# Patient Record
Sex: FEMALE | Race: WHITE | HISPANIC OR LATINO | Employment: FULL TIME | URBAN - METROPOLITAN AREA
[De-identification: names, ages, dates, MRNs, and addresses within clinical notes are randomized per-mention and may not be internally consistent; named-entity substitution may affect disease eponyms.]

---

## 2021-10-06 ENCOUNTER — OFFICE VISIT (OUTPATIENT)
Dept: OBGYN CLINIC | Age: 26
End: 2021-10-06
Payer: COMMERCIAL

## 2021-10-06 VITALS
DIASTOLIC BLOOD PRESSURE: 70 MMHG | SYSTOLIC BLOOD PRESSURE: 124 MMHG | BODY MASS INDEX: 22.89 KG/M2 | WEIGHT: 137.4 LBS | HEIGHT: 65 IN

## 2021-10-06 DIAGNOSIS — Z30.41 ENCOUNTER FOR SURVEILLANCE OF CONTRACEPTIVE PILLS: ICD-10-CM

## 2021-10-06 DIAGNOSIS — Z01.419 ENCOUNTER FOR ANNUAL ROUTINE GYNECOLOGICAL EXAMINATION: Primary | ICD-10-CM

## 2021-10-06 PROCEDURE — G0145 SCR C/V CYTO,THINLAYER,RESCR: HCPCS | Performed by: NURSE PRACTITIONER

## 2021-10-06 PROCEDURE — S0610 ANNUAL GYNECOLOGICAL EXAMINA: HCPCS | Performed by: NURSE PRACTITIONER

## 2021-10-06 RX ORDER — NORETHINDRONE ACETATE AND ETHINYL ESTRADIOL 1MG-20(21)
1 KIT ORAL DAILY
COMMUNITY
End: 2021-12-20 | Stop reason: SDUPTHER

## 2021-10-12 LAB
LAB AP GYN PRIMARY INTERPRETATION: NORMAL
Lab: NORMAL

## 2021-12-14 ENCOUNTER — TELEPHONE (OUTPATIENT)
Dept: OBGYN CLINIC | Facility: CLINIC | Age: 26
End: 2021-12-14

## 2021-12-14 DIAGNOSIS — Z30.41 ENCOUNTER FOR SURVEILLANCE OF CONTRACEPTIVE PILLS: Primary | ICD-10-CM

## 2021-12-14 RX ORDER — NORETHINDRONE ACETATE AND ETHINYL ESTRADIOL 1MG-20(21)
1 KIT ORAL DAILY
Qty: 90 TABLET | Refills: 1 | Status: CANCELLED | OUTPATIENT
Start: 2021-12-14

## 2021-12-20 DIAGNOSIS — Z30.41 ENCOUNTER FOR SURVEILLANCE OF CONTRACEPTIVE PILLS: Primary | ICD-10-CM

## 2021-12-20 RX ORDER — NORETHINDRONE ACETATE AND ETHINYL ESTRADIOL 1MG-20(21)
1 KIT ORAL DAILY
Qty: 90 TABLET | Refills: 3 | Status: SHIPPED | OUTPATIENT
Start: 2021-12-20

## 2022-11-21 ENCOUNTER — OFFICE VISIT (OUTPATIENT)
Dept: FAMILY MEDICINE CLINIC | Facility: CLINIC | Age: 27
End: 2022-11-21

## 2022-11-21 VITALS
WEIGHT: 141 LBS | TEMPERATURE: 98.8 F | SYSTOLIC BLOOD PRESSURE: 130 MMHG | OXYGEN SATURATION: 98 % | HEIGHT: 64 IN | DIASTOLIC BLOOD PRESSURE: 70 MMHG | BODY MASS INDEX: 24.07 KG/M2 | HEART RATE: 102 BPM

## 2022-11-21 DIAGNOSIS — Z76.89 ENCOUNTER TO ESTABLISH CARE: ICD-10-CM

## 2022-11-21 DIAGNOSIS — Z11.1 ENCOUNTER FOR PPD TEST: ICD-10-CM

## 2022-11-21 DIAGNOSIS — Z00.00 ANNUAL PHYSICAL EXAM: Primary | ICD-10-CM

## 2022-11-21 NOTE — PROGRESS NOTES
[address]    NAME: Eddie Alexander  AGE: 28 y o  SEX: female  : 1995     DATE: 2022     Assessment and Plan:     Problem List Items Addressed This Visit    None  Visit Diagnoses     Annual physical exam    -  Primary    Positive depression screen addressed  Patient has good support system, starting new position at local school  Encounter to establish care        Encounter for PPD test        Relevant Orders    TB Skin Test (Completed)          Immunizations and preventive care screenings were discussed with patient today  Appropriate education was printed on patient's after visit summary  Counseling:  Alcohol/drug use: discussed moderation in alcohol intake, the recommendations for healthy alcohol use, and avoidance of illicit drug use  Dental Health: discussed importance of regular tooth brushing, flossing, and dental visits  Injury prevention: discussed safety/seat belts, safety helmets, smoke detectors, carbon dioxide detectors, and smoking near bedding or upholstery  Sexual health: discussed sexually transmitted diseases, partner selection, use of condoms, avoidance of unintended pregnancy, and contraceptive alternatives  Exercise: the importance of regular exercise/physical activity was discussed  Recommend exercise 3-5 times per week for at least 30 minutes  Depression Screening and Follow-up Plan: Patient's depression screening was positive with a PHQ-2 score of 3  Their PHQ-9 score was 3  Patient assessed for underlying major depression  Brief counseling provided and recommend additional follow-up/re-evaluation next office visit           Return in 1 year (on 2023) for Annual physical      Chief Complaint:     Chief Complaint   Patient presents with   • Establish Care   • Annual Exam      History of Present Illness:     Adult Annual Physical   Patient here for a comprehensive physical exam  The patient reports no problems  Diet and Physical Activity  Diet/Nutrition: well balanced diet  Exercise: vigorous cardiovascular exercise, 5-7 times a week on average and 1-2 hours on average  Depression Screening  PHQ-2/9 Depression Screening    Little interest or pleasure in doing things: 3 - nearly every day  Feeling down, depressed, or hopeless: 0 - not at all  Trouble falling or staying asleep, or sleeping too much: 0 - not at all  Feeling tired or having little energy: 0 - not at all  Poor appetite or overeatin - not at all  Feeling bad about yourself - or that you are a failure or have let yourself or your family down: 0 - not at all  Trouble concentrating on things, such as reading the newspaper or watching television: 0 - not at all  Moving or speaking so slowly that other people could have noticed  Or the opposite - being so fidgety or restless that you have been moving around a lot more than usual: 0 - not at all  Thoughts that you would be better off dead, or of hurting yourself in some way: 0 - not at all  PHQ-2 Score: 3  PHQ-2 Interpretation: POSITIVE depression screen  PHQ-9 Score: 3   PHQ-9 Interpretation: No or Minimal depression        General Health  Sleep: sleeps well and gets 7-8 hours of sleep on average  Hearing: normal - bilateral   Vision: no vision problems, most recent eye exam >1 year ago and wears glasses and contacts  Dental: regular dental visits and brushes teeth twice daily  /GYN Health  Last menstrual period: 22  Contraceptive method: oral contraceptives  History of STDs?: no      Review of Systems:     Review of Systems   Constitutional: Negative for chills and fever  HENT: Negative for ear pain and sore throat  Eyes: Negative for pain and visual disturbance  Respiratory: Negative for cough and shortness of breath  Cardiovascular: Negative for chest pain and palpitations     Gastrointestinal: Negative for abdominal pain and vomiting  Genitourinary: Negative for dysuria and hematuria  Musculoskeletal: Negative for arthralgias and back pain  Skin: Negative for color change and rash  Neurological: Negative for seizures and syncope  Psychiatric/Behavioral: Negative for decreased concentration and sleep disturbance  The patient is not nervous/anxious  All other systems reviewed and are negative  Past Medical History:     Past Medical History:   Diagnosis Date   • Varicella       Past Surgical History:     Past Surgical History:   Procedure Laterality Date   • APPENDECTOMY  2012      Social History:     Social History     Socioeconomic History   • Marital status: /Civil Union     Spouse name: None   • Number of children: None   • Years of education: None   • Highest education level: None   Occupational History   • None   Tobacco Use   • Smoking status: Never   • Smokeless tobacco: Never   Vaping Use   • Vaping Use: Never used   Substance and Sexual Activity   • Alcohol use: Yes   • Drug use: Never   • Sexual activity: Yes     Partners: Male     Birth control/protection: OCP   Other Topics Concern   • None   Social History Narrative   • None     Social Determinants of Health     Financial Resource Strain: Not on file   Food Insecurity: Not on file   Transportation Needs: Not on file   Physical Activity: Not on file   Stress: Not on file   Social Connections: Not on file   Intimate Partner Violence: Not on file   Housing Stability: Not on file      Family History:     Family History   Problem Relation Age of Onset   • Breast cancer Neg Hx    • Colon cancer Neg Hx    • Cancer Neg Hx    • Ovarian cancer Neg Hx       Current Medications:     Current Outpatient Medications   Medication Sig Dispense Refill   • norethindrone-ethinyl estradiol (JUNEL FE 1/20) 1-20 MG-MCG per tablet Take 1 tablet by mouth daily 90 tablet 3     No current facility-administered medications for this visit        Allergies:     No Known Allergies   Physical Exam:     /70 (BP Location: Right arm, Patient Position: Sitting)   Pulse 102   Temp 98 8 °F (37 1 °C) (Tympanic)   Ht 5' 4" (1 626 m)   Wt 64 kg (141 lb)   SpO2 98%   BMI 24 20 kg/m²     Physical Exam  Vitals and nursing note reviewed  Constitutional:       General: She is not in acute distress  Appearance: Normal appearance  She is well-developed  She is not ill-appearing  HENT:      Right Ear: Tympanic membrane, ear canal and external ear normal       Left Ear: Tympanic membrane, ear canal and external ear normal    Eyes:      Pupils: Pupils are equal, round, and reactive to light  Cardiovascular:      Rate and Rhythm: Normal rate and regular rhythm  Pulses: Normal pulses  Heart sounds: Normal heart sounds  No murmur heard  Pulmonary:      Effort: Pulmonary effort is normal  No respiratory distress  Breath sounds: Normal breath sounds  Musculoskeletal:         General: No swelling or tenderness  Normal range of motion  Cervical back: Normal range of motion  Right lower leg: No edema  Left lower leg: No edema  Lymphadenopathy:      Cervical: No cervical adenopathy  Skin:     General: Skin is warm and dry  Capillary Refill: Capillary refill takes less than 2 seconds  Neurological:      Mental Status: She is alert and oriented to person, place, and time  Psychiatric:         Mood and Affect: Mood normal          Behavior: Behavior normal          Thought Content:  Thought content normal          Judgment: Judgment normal           Joseph Aguilar, [facility] [address] [facility]

## 2022-11-21 NOTE — PATIENT INSTRUCTIONS

## 2022-11-23 ENCOUNTER — CLINICAL SUPPORT (OUTPATIENT)
Dept: FAMILY MEDICINE CLINIC | Facility: CLINIC | Age: 27
End: 2022-11-23

## 2022-11-23 DIAGNOSIS — Z11.1 ENCOUNTER FOR PPD SKIN TEST READING: Primary | ICD-10-CM

## 2022-11-23 LAB
INDURATION: 0 MM
TB SKIN TEST: NEGATIVE

## 2022-12-05 ENCOUNTER — TELEPHONE (OUTPATIENT)
Dept: OBGYN CLINIC | Facility: CLINIC | Age: 27
End: 2022-12-05

## 2022-12-05 DIAGNOSIS — Z30.41 ENCOUNTER FOR SURVEILLANCE OF CONTRACEPTIVE PILLS: ICD-10-CM

## 2022-12-05 RX ORDER — NORETHINDRONE ACETATE AND ETHINYL ESTRADIOL 1MG-20(21)
1 KIT ORAL DAILY
Qty: 28 TABLET | Refills: 3 | Status: SHIPPED | OUTPATIENT
Start: 2022-12-05

## 2022-12-05 NOTE — TELEPHONE ENCOUNTER
Patient scheduled her yearly for 2/20/23 - she will need a rx for her bc prior to her appt.     Please advise

## 2022-12-22 DIAGNOSIS — Z30.41 ENCOUNTER FOR SURVEILLANCE OF CONTRACEPTIVE PILLS: ICD-10-CM

## 2022-12-22 RX ORDER — NORETHINDRONE ACETATE AND ETHINYL ESTRADIOL 1MG-20(21)
1 KIT ORAL DAILY
Qty: 28 TABLET | Refills: 0 | Status: CANCELLED | OUTPATIENT
Start: 2022-12-22

## 2023-03-06 ENCOUNTER — ANNUAL EXAM (OUTPATIENT)
Dept: OBGYN CLINIC | Age: 28
End: 2023-03-06

## 2023-03-06 VITALS
WEIGHT: 141 LBS | DIASTOLIC BLOOD PRESSURE: 72 MMHG | HEIGHT: 64 IN | BODY MASS INDEX: 24.07 KG/M2 | SYSTOLIC BLOOD PRESSURE: 116 MMHG

## 2023-03-06 DIAGNOSIS — Z01.419 ENCOUNTER FOR ANNUAL ROUTINE GYNECOLOGICAL EXAMINATION: Primary | ICD-10-CM

## 2023-03-06 DIAGNOSIS — Z30.41 ENCOUNTER FOR SURVEILLANCE OF CONTRACEPTIVE PILLS: ICD-10-CM

## 2023-03-06 RX ORDER — NORETHINDRONE ACETATE AND ETHINYL ESTRADIOL 1MG-20(21)
1 KIT ORAL DAILY
Qty: 84 TABLET | Refills: 4 | Status: SHIPPED | OUTPATIENT
Start: 2023-03-06

## 2023-03-06 NOTE — PROGRESS NOTES
Patient is here today for a gynecological annual exam    No questions or concerns today  LMP: 2023   Menarche age: 14    BC: Manuel West    Last pap: 10/06/2021     Hx of abnormal paps?  No    Gardasil: Series completed per patient

## 2023-03-06 NOTE — ASSESSMENT & PLAN NOTE
ASCCP guidelines reviewed  Pap utd  Perineal hygiene reviewed  Kegel exercises recommended  SBE, daily exercise and healthy diet with adequate calcium and vitamin D encouraged  Weight bearing exercises a minimum of 150 minutes/week advised  Advised to call with any issues, all concerns & questions addressed     See provided information in your after visit summary

## 2023-03-06 NOTE — PROGRESS NOTES
Johnny Sherwood  1995    Assessment and Plan:  Yearly exam    Encounter for annual routine gynecological examination  ASCCP guidelines reviewed  Pap utd  Perineal hygiene reviewed  Kegel exercises recommended  SBE, daily exercise and healthy diet with adequate calcium and vitamin D encouraged  Weight bearing exercises a minimum of 150 minutes/week advised  Advised to call with any issues, all concerns & questions addressed  See provided information in your after visit summary     Diagnoses and all orders for this visit:    Encounter for annual routine gynecological examination    Encounter for surveillance of contraceptive pills  -     norethindrone-ethinyl estradiol (JUNEL FE 1/20) 1-20 MG-MCG per tablet; Take 1 tablet by mouth daily      F/U Annually and PRN    Health Maintenance:    Last PAP: 10/06/2021  NILM  Next PAP Due: 10/2024    Gardasil Vaccine Series: She has had the Gardasil series  Subjective    CC: Yearly Exam     Johnny Sherwood is a 28 y o  female  here for an annual exam   No concerns today  Menarche: 14    Patient's last menstrual period was 2023 (exact date)  Sexual activity: She is sexually active without pain, bleeding or dryness  Contraception: OCPs  STD testing:  She does not want STD testing today  non- smoker, socially drinker  Exercise- regularly    Her menstrual cycles are regular every 28-30 days  She denies any issues with bleeding or her menses  She denies breast concerns, abnormal vaginal discharge, vaginal itching, odor, irritation, bowel/bladder dysfunction, urinary symptoms, pelvic pain, or dyspareunia today       Family hx of breast cancer: No  Family hx of ovarian cancer: No  Family hx of colon cancer: No    Past Medical History:   Diagnosis Date   • Varicella      Past Surgical History:   Procedure Laterality Date   • APPENDECTOMY         Immunization History   Administered Date(s) Administered   • Tuberculin Skin Test-PPD Intradermal 2022       Family History   Problem Relation Age of Onset   • Breast cancer Neg Hx    • Colon cancer Neg Hx    • Cancer Neg Hx    • Ovarian cancer Neg Hx      Social History     Tobacco Use   • Smoking status: Never   • Smokeless tobacco: Never   Vaping Use   • Vaping Use: Never used   Substance Use Topics   • Alcohol use: Yes   • Drug use: Never       Current Outpatient Medications:   •  norethindrone-ethinyl estradiol (JUNEL FE 1/20) 1-20 MG-MCG per tablet, Take 1 tablet by mouth daily, Disp: 84 tablet, Rfl: 4  Patient Active Problem List    Diagnosis Date Noted   • Encounter for annual routine gynecological examination 10/06/2021   • Encounter for surveillance of contraceptive pills 10/06/2021       No Known Allergies    OB History    Para Term  AB Living   0 0 0 0 0 0   SAB IAB Ectopic Multiple Live Births   0 0 0 0 0       Vitals:    23 1627   BP: 116/72   BP Location: Left arm   Patient Position: Sitting   Cuff Size: Standard   Weight: 64 kg (141 lb)   Height: 5' 4" (1 626 m)     Body mass index is 24 2 kg/m²  Review of Systems   Constitutional: Negative for chills, fatigue, fever and unexpected weight change  Respiratory: Negative for shortness of breath  Cardiovascular: Negative for chest pain  Gastrointestinal: Negative for abdominal pain, constipation, diarrhea, nausea and vomiting  Genitourinary: Negative for difficulty urinating, dyspareunia, dysuria, frequency, genital sores, hematuria, menstrual problem, pelvic pain, urgency, vaginal bleeding, vaginal discharge and vaginal pain  Musculoskeletal: Negative for back pain and myalgias  Skin: Negative for pallor and rash  Neurological: Negative for dizziness, light-headedness and headaches  Hematological: Negative for adenopathy  Psychiatric/Behavioral: Negative for dysphoric mood  Physical Exam  Constitutional:       General: She is not in acute distress  Appearance: Normal appearance   She is not ill-appearing  Genitourinary:      Bladder and urethral meatus normal       No lesions in the vagina  Right Labia: No rash, tenderness, lesions, skin changes or Bartholin's cyst      Left Labia: No tenderness, lesions, skin changes, Bartholin's cyst or rash  No inguinal adenopathy present in the right or left side  No vaginal discharge, erythema, tenderness, bleeding or ulceration  Right Adnexa: not tender, not full and no mass present  Left Adnexa: not tender, not full and no mass present  Cervix is nulliparous  No cervical motion tenderness, discharge, friability, lesion, polyp, nabothian cyst, eversion or elongation  Uterus is not enlarged, fixed or tender  No uterine mass detected  No urethral prolapse, tenderness or discharge present  Bladder is not tender and urgency on palpation not present  Pelvic exam was performed with patient in the lithotomy position  Breasts:     Right: No swelling, inverted nipple, mass, nipple discharge, skin change or tenderness  Left: No swelling, inverted nipple, mass, nipple discharge, skin change or tenderness  HENT:      Head: Normocephalic and atraumatic  Eyes:      Conjunctiva/sclera: Conjunctivae normal    Pulmonary:      Effort: Pulmonary effort is normal    Abdominal:      General: There is no distension  Palpations: Abdomen is soft  Tenderness: There is no abdominal tenderness  Musculoskeletal:         General: Normal range of motion  Cervical back: Neck supple  Lymphadenopathy:      Upper Body:      Right upper body: No supraclavicular or axillary adenopathy  Left upper body: No supraclavicular or axillary adenopathy  Lower Body: No right inguinal adenopathy  No left inguinal adenopathy  Neurological:      Mental Status: She is alert and oriented to person, place, and time  Skin:     General: Skin is warm and dry     Psychiatric:         Mood and Affect: Mood normal  Behavior: Behavior normal          Thought Content: Thought content normal          Judgment: Judgment normal    Vitals and nursing note reviewed

## 2023-03-06 NOTE — PATIENT INSTRUCTIONS
Oral Contraception Instructions: If you miss 1 pill:  Take it as soon as you remember! You are still covered for birth control  This may mean you take 2 tablets at the same time, which is okay  If you miss 2 pills: You will take 2 pills one day, and then 2 pills the next day to get caught up  You are still covered for birth control  If you miss 3 pills: You now need to use barrier contraception (condoms) as your birth control pills will not be effective  Please start a new pack of pills today  You will likely have breakthrough bleeding  If you have any question please call the office  Remember, it may take up to 3 months for your body to adjust to a new birth control pill  Breakthrough bleeding is not uncommon  Birth Control Pills   WHAT YOU NEED TO KNOW:   What are birth control pills? Birth control pills are also called oral contraceptives, or the pill  It is medicine that helps prevent pregnancy by stopping ovulation  Ovulation is when the ovaries make and release an egg cell each month  If this egg gets fertilized by sperm, pregnancy occurs  You will need to take the pill at the same time every day  Your healthcare provider will tell you when to start taking the pill  You will also be told what to do if you miss a dose  Instructions will depend on the kind of birth control pills you are taking  What are the different kinds of birth control pills? Some kinds are taken for 21 days in a row, followed by 7 days of placebo (no hormones) pills  Other kinds are taken for 24 days followed by 4 days of placebos  Each kind has a certain amount of female hormones  Your provider will decide on the kind that is best for you based on your age and other health conditions  What may be done before I can start taking birth control pills? You need to see your healthcare provider to get a prescription   Any of the following may be done before your healthcare provider gives you a prescription:  Your healthcare provider will ask about diseases and illnesses you have had in the past  Your provider will check your risk for blood clots, heart conditions, or stroke  Tell your provider if you had gastric bypass surgery  This surgery can affect the way your body absorbs medicines such as birth control pills  Your provider will also check your blood pressure, and may do a breast and pelvic exam  A Pap smear may also be done during the pelvic exam  This is a test to make sure you do not have abnormal changes on your cervix  You may need other tests, such as a urine test to make sure you are not pregnant  Your provider will ask if you take any medicines and if you smoke  Smoking increases your risk for stroke, heart attack, or a blood clot in your lungs  If you smoke, you should not take certain kinds of birth control pills  What are the advantages of birth control pills? When birth control pills are used correctly, the chances of getting pregnant are very low  Birth control pills may help decrease bleeding and pain during your monthly period  They may also help prevent cancer of the uterus and ovaries  What are the disadvantages of birth control pills? You may have sudden changes in your mood or feelings while you take birth control pills  You may have nausea and a decreased sex drive  You may have an increased appetite and rapid weight gain  You may also have bleeding in between periods, less frequent periods, vaginal dryness, and breast pain  Birth control pills will not protect you from sexually transmitted infections  Rarely, some birth control pills can increase your risk for a blood clot  This may become life-threatening  What should I do if I decide I want to get pregnant? If you are planning to have a baby, ask your healthcare provider when you may stop taking your birth control pills  It may take some time for you to start ovulating again   Ask your healthcare provider for more information about pregnancy after birth control pills  When should I start taking birth control pills after I have a baby? If you are not breastfeeding, you may start taking birth control pills 3 weeks after you give birth  You may be able to take certain types of birth control pills if you are breastfeeding  These pills can be started from 6 weeks to 6 months after you give birth  Ask your healthcare provider for more information about when to start taking birth control pills after you give birth  What do I need to know about birth control pills and menopause? Talk with your healthcare provider if you want to take birth control pills around menopause  Around age 39, you will enter into perimenopause  This means your hormone levels are dropping and you are ovulating less often  You can still become pregnant during this time  The risk for problems, such as miscarriage, are higher if you become pregnant after age 39  Birth control pills will prevent pregnancy, and may also help prevent or relieve some signs and symptoms of menopause  Examples are hot flashes and mood swings  Your provider will do tests when you are around age 48  The tests may show that you are in menopause  If the tests do not show menopause for sure, you may be able to continue taking the pill up to age 54  The decision will depend on your health and if you have any medical conditions, such as a blood clot  Call your local emergency number (911 in the [address] [place] [address],[address]) for any of the following: You have any of the following signs of a stroke:      Numbness or drooping on one side of your face     Weakness in an arm or leg    Confusion or difficulty speaking    Dizziness, a severe headache, or vision loss    You feel lightheaded, short of breath, and have chest pain  You cough up blood  When should I seek immediate care? Your arm or leg feels warm, tender, and painful  It may look swollen and red      You have severe pain, numbness, or swelling in your arms or legs     When should I call my doctor? You have forgotten to take a birth control pill  You have mood changes, such as depression, since starting birth control pills  You have nausea or are vomiting  You have severe abdominal pain  You missed a period and have questions or concerns about being pregnant  You still have bleeding 4 months after taking birth control pills correctly  You have questions or concerns about your condition or care  CARE AGREEMENT:   You have the right to help plan your care  Learn about your health condition and how it may be treated  Discuss treatment options with your healthcare providers to decide what care you want to receive  You always have the right to refuse treatment  The above information is an  only  It is not intended as medical advice for individual conditions or treatments  Talk to your doctor, nurse or pharmacist before following any medical regimen to see if it is safe and effective for you  © Copyright Renata Musa 2022 Information is for End User's use only and may not be sold, redistributed or otherwise used for commercial purposes

## 2024-01-10 ENCOUNTER — ULTRASOUND (OUTPATIENT)
Dept: OBGYN CLINIC | Facility: MEDICAL CENTER | Age: 29
End: 2024-01-10
Payer: COMMERCIAL

## 2024-01-10 VITALS
WEIGHT: 139 LBS | HEIGHT: 64 IN | BODY MASS INDEX: 23.73 KG/M2 | SYSTOLIC BLOOD PRESSURE: 128 MMHG | DIASTOLIC BLOOD PRESSURE: 70 MMHG

## 2024-01-10 DIAGNOSIS — N91.2 AMENORRHEA: Primary | ICD-10-CM

## 2024-01-10 DIAGNOSIS — N94.89 ADNEXAL MASS: ICD-10-CM

## 2024-01-10 DIAGNOSIS — Z36.89 ESTABLISH GESTATIONAL AGE, ULTRASOUND: ICD-10-CM

## 2024-01-10 DIAGNOSIS — Z3A.10 10 WEEKS GESTATION OF PREGNANCY: ICD-10-CM

## 2024-01-10 PROBLEM — Z01.419 ENCOUNTER FOR ANNUAL ROUTINE GYNECOLOGICAL EXAMINATION: Status: RESOLVED | Noted: 2021-10-06 | Resolved: 2024-01-10

## 2024-01-10 PROBLEM — Z30.41 ENCOUNTER FOR SURVEILLANCE OF CONTRACEPTIVE PILLS: Status: RESOLVED | Noted: 2021-10-06 | Resolved: 2024-01-10

## 2024-01-10 PROCEDURE — 76817 TRANSVAGINAL US OBSTETRIC: CPT | Performed by: CLINICAL NURSE SPECIALIST

## 2024-01-10 PROCEDURE — 99213 OFFICE O/P EST LOW 20 MIN: CPT | Performed by: CLINICAL NURSE SPECIALIST

## 2024-01-10 NOTE — ASSESSMENT & PLAN NOTE
Rt adnexal Hypoechoic cycstic lesion 6.82 x 7.28 x 6.89 cm   Formal US in radiology ordered for further assessment  Torsion precautions reviewed.  
She is a  with Patient's last menstrual period was 10/27/2023 (exact date). Ultrasound today is showing a viable IUP that is c/w GA by LMP. NO change in EDC - 24.  Referral to [facility] given for routine US. F/U for OB intake and then Initial Prenatal Exam.    
63
67

## 2024-01-10 NOTE — PATIENT INSTRUCTIONS
"Again, congratulations on your pregnancy!  NEXT STEPS  Get Prenatal bloodwork  Call MFM to schedule next ultrasound (done 12-13 wks)   Contact information for [facility])- Main Number [phone]   Return to our office in 1-2 weeks for an OB intake and initial prenatal Exam(or sooner if any problems/concerns arise- see packet for things to report)  Check out [facility] website to read the \"Pregnancy Essentials Guide\" -this has lots of great early pregnancy information     It can be found by going to [employer]-->select services-->select women's health-->select Obstetrics  [email or web address]    Below is a variety of information that is useful in early pregnancy   Genetic screening can be very confusing for most people   We do recommend genetic screening universally for all pregnant women. Our goal is to have the most healthy uncomplicated pregnancy possible and this is one way to identify possible complications early.  Common disorders we can screen for include: Down Syndrome, Neural tube defects ( like spina bifida or gastroschisis) and trisomy 13, even possibly more  There are several options we will talk more about. Below is some information to get you started thinking about this.  We will discuss this more at the next appt.     GUIDE TO GENETIC TESTING    Aneuploidy Testing  Trisomy 21 (Down Syndrome), Trisomy 18, and Open Neural Tube Defects (Spina Bifida).  You may choose one of the following options: See below for CPT/Diagnostic codes  NIPT (Non-Invasive Prenatal Testing or Cell Free- Fetal DNA): This simple and accurate non-invasive prenatal screening blood test offers results for early risk assessment of Down syndrome (Trisomy 21), or Trisomy 18, trisomy 13 and other aneuploidy conditions.  The test also offers an optional analysis for fetal sex.  The test analyzes the relative amount of 21, 18, 13; X and Y chromosome " material in circulating cell-free DNA from a maternal blood sample.  This test can be performed at any time after 10 weeks gestation.  If you elect this test, you will also have an AFP (alpha-fetoprotein) blood test to test for open neural tube defects.  Recommended follow up to a positive result is genetic counseling and prenatal diagnosis.     Sequential Screening with Nuchal Translucency: This is a two-step test to detect whether a fetus is at increased risk for trisomy 21, trisomy 18, trisomy 13 and open neural tube defects.  The test has a narrow window for testing (the first step must be performed between 10 and 13 weeks gestation).  It includes two blood draws and an ultrasound.  The ultrasound measures the amount of fluid behind the baby’s neck called the nuchal translucency (NT).  The blood tests measures three different maternal hormone levels, -- pregnancy associated plasma protein (MAIN-A), human chorionic gonadotrophin (hCG), and dimeric inhibin A (DAISY).  These measurements in combination with some maternal information such as height and weight are used to calculate whether the baby is at increased risk for Down Syndrome or Trisomy 18.  An alpha-fetoprotein test (AFP) is also included to test for open neural tube defects.  Recommended follow up to a positive result is additional testing that is more definitive, and referral for genetic counseling and prenatal diagnosis.    Quad Screen: This test is also known as the quadruple marker test.  It is a test that measures levels of four substances in a pregnant woman’s blood--Alpha-fetoprotein (AFP), a protein made by the developing baby; Human chorionic gonadotropin (hCG), a hormone made by the placenta; Estriol, a hormone made by the placenta and the baby’s liver; and Inhibin A, another hormone made by the placenta.  Typically, the quad screen is done between weeks 15 and 20 of pregnancy--the second trimester and the results indicate whether the baby is at  higher risk for Down Syndrome (Trisomy 21), Trisomy 18, and open neural tube defects.  This is a screening test.  Recommended follow up to a positive result is additional testing that is more definitive, as well as referral for genetic counseling and prenatal diagnosis.        Trisomy 21 Trisomy 18 Trisomy 13   NIPT  (FPR 0.1%) <99% <98% 99%   Sequential Screening (FPR 3.5%) 92% 90% N/A   Quad Screen   (FPR 5%) 83% 80% N/A   (FPR is False Positive Rate)       Additional Screening Tests Offered  Cystic Fibrosis: Cystic Fibrosis is the most common inherited disease of children and young adults.  The carrier frequency is 1 in 24, to 1 in 97.  Both parents need to be carriers for a child to be affected (25% chance).  One in 3500, children born are affected.  Cystic fibrosis is a disorder of mucus production and produces abnormally thick mucus leading to life threatening lung infections, digestion problems, poor growth, infertility, and more.  Symptoms range from mild to severe, but individuals with severe disease may die in childhood.  With treatments today, people with Cystic Fibrosis can live into their 30’s.  CF does not affect intelligence.  Recommended follow up to a positive result is testing of the baby’s father.  Spinal Muscular Atrophy (SMA): SMA is the most common inherited cause of early childhood death.  The carrier frequency is 1 in 47 to 1 in 72 in the [place] and both parents need to be carriers for a child to be affected (25% chance).  1 in 11,000 children are affected.  SMA is a progressive degeneration of lower motor neurons.  Muscle weakness is the most common type with respiratory failure by the age of 2 years old.  Muscles responsible for crawling, walking, swallowing, and head and neck control are most severely affected.  Recommended follow up to a positive result is testing of the baby’s father.      Fragile X Syndrome (the most common inherited cause of developmental delays): Fragile X syndrome is an  “X-linked” genetic disease, which means it is only carried by the mom.  Unfortunately, 1 in 250 females are carriers and a child has a 50% chance of being affected if this is the case.  1 in 4000 boys is affected with Fragile X and 1 in 8000 girls is affected.  Approximately 1/3 of all children born with Fragile X also have autism and hyperactivity.  Recommended follow up to a positive result is genetic counseling and prenatal diagnosis.      Guide To Insurance Coverage For Genetic Screening  Due to the complexity of coverage guidelines, we are unable to quote benefits or guarantee insurance coverage for any of these tests.  Insurance benefits are plan-specific and offer vastly different coverage based on your policy.  The handout attached explains the benefits to each test, and also provides the billing (CPT) codes for each test.  Even if the testing is covered, it could be applied to any unmet deductibles, and copays may apply, resulting in a bill.  You are encouraged to contact your insurance company to obtain your benefits based on your age and risk factors, so that there are no surprises.      Test Insurance Procedure (CPT) code   NIPT-cell free fetal DNA Most accurate non-invasive test- not always covered w/o risk factors 24458   Sequential Screen w/ NT US Current standard test-should be covered Part 1: (if lab is [facility]) 01760,83418   (If lab is [facility]) 98904  Part 2: (if lab is [facility])24611,14367,01441, 16790  (If lab is [facility]) 90022   Quad screen Old standard-use if past 1st trimester 09278, 69957, 81688, 88952   CF- Cystic Fibrosis  53173   SMA- Spinal Musc. Atrophy  05804   Fragile X  42133       Diagnosis code used Varies based on history- But will be one of these:  Encounter for  screening for other genetic defect Z36.8  Advanced Maternal Age (over 35) O09.529  Family History of Genetic Disease Carrier Z84.81    Please contact your insurance company with the appropriate CPT code from the  attached list, and diagnosis code applicable to your situation.    We ask that you review this information and decide what testing you would like to have performed.  Please note that the Sequential Screening with Nuchal Translucency has a smaller window of time to be performed during pregnancy (Prior to 14 wks).        Warning Signs During Pregnancy  The list below includes warning signs your providers would like you to be aware of.  If you experience any of these at any time during your pregnancy, please call us as soon as possible.     Vaginal bleeding   Sharp abdominal pain that does not go away   Fever (more than 100.4?F and is not relieved with Tylenol)   Persistent vomiting lasting greater than 24 hours   Chest pain   Pain or burning when you urinate   Severe headache that doesn’t resolve with Tylenol   Blurred vision or seeing spots in your vision   Sudden swelling of your face or hands   Redness, swelling or pain in a leg   A sudden weight gain in just a few days   Decrease in your baby’s movements (after 28 weeks or the 6th month of pregnancy)   A loss of watery fluid from your vagina - can be a gush, a trickle or  continuous wetness   After 20 weeks of pregnancy, rhythmic cramping (greater than 4 per hour)  or menstrual like low/pelvic pain    Call the OFFICE [phone] for any questions/emergencies.  At night or on the weekend, please indicate it is an emergency and the DOCTOR on call will be paged.    Discomforts of Early Pregnancy    Tips for coping with nausea and vomiting during pregnancy   Eat meals and snacks slowly   Eat every 1-2 hours to avoid a full stomach   Don’t skip meals, avoid empty stomach   Eat a snack prior to getting out of bed   Avoid food and beverages with a strong aroma   Avoid dehydration - drink enough fluid to keep the urine pale yellow   Drink fluids before a meal to minimize the effect of a full stomach   Limit the amount of coffee and beverages that contain caffeine    Eliminate spicy, odorous, high fat (fried foods), acidic (tomato products) and sweet foods   Fluids that contain lemon (lemonade), mint (tea) or orange can usually be well tolerated   Snacks and meals that contain low-fat protein (lean meats, fish, poultry and eggs) along with eating easily digestible carbs (fruit, rice, toast, crackers and dry cereal) may be tolerated better   Foods with ginger may be well tolerated. May use ginger root powder, capsules or extract (up to 1000 mg per day)   Drink liquids in small amounts    If symptoms persist, please contact your provider.      Tips for coping with constipation during pregnancy   Increase fiber and fluids.  - Drink 8-10 cups of liquid, like water or low-sugar juice daily  - Keep urine pale with fluids (water, milk), fruit and vegetables   Eat a well-balanced diet that contains high fiber food (fruits, vegetables, whole grain breads and cereals, bran and dried beans)   Take a 30-minute walk daily   You may take a mild stool softener such as Colace®    If symptoms persist, please contact your provider.      For any emergencies, PLEASE CALL THE OFFICE at [phone]. If the office is closed, the doctor on call will be paged by the answering service.    Medications and Pregnancy- see Pregnancy Essentials guide online- page 9    Expected Weight Gain During Pregnancy  If you have a healthy BMI (18-25) prior to pregnancy:  The recommended weight gain is between 25-35 pounds. Approximate weight gain  in the first trimester is 1-4.5 pounds. An expected weight gain during the second and  third trimester is approximately one pound per week.  If you have a BMI of less than 18 prior to pregnancy,  you are considered underweight:  The recommended weight gain is between 28-40 pounds. Approximate weight gain  in the first trimester is 1-4.5 pounds. An expected weight gain during the second and  third trimester is just over one pound per week.  If your BMI is 25 to 29.9: you are  considered overweight:  You should gain 1/2 to 2/3 pound during the second and third trimester, for a total  weight gain of 15 to 25 pounds.  If you have a BMI of greater than 30 prior to pregnancy,  you are considered overweight:  The recommended weight gain is between 15-25 pounds. Approximate weight gain  in the first trimester is 1-4.5 pounds. An expected weight gain during the second  and third trimester is approximately 0.5 pound per week.    Foods to avoid during pregnancy:   Unpasteurized milk, juice and cheese  - Soft cheeses like feta or brie (if made with UNPASTEURIZED milk)   Unheated deli meats like lunchmeat and hotdogs   Undercooked poultry, beef, pork, seafood including raw sushi    What fish is safe to eat during pregnancy?  Eat 8 to 12 ounces of fish a week. Pick from this group frequently, especially if you follow  the American Heart Association’s recommendation to eat fish at least 2 times a week.    AVOID HIGH MERCURY FISH  A single meal of the following fish can put  you over the Environmental Protection  Agency’s safe limit for the month.  High mercury fish:  Shark  Swordfish  James Mackerel  Tile Fish    Caffeine and Pregnancy    The March of Dimes and American College of Obstetrics and Gynecologists (ACOG) urge pregnant  women to limit their caffeine consumption to no more than 200 milligrams (mg) per day. This is  comparable to having one 12-ounce cup of coffee a day. This level has been shown not to increase risk  of miscarriage, growth or  labor complications. Effects of higher levels are not known.    Exercise During Pregnancy  A daily exercise program that consists of 30 minutes a day is recommended.   Low impact exercises like walking and swimming are great exercises throughout  all of pregnancy   If you’re an avid strength  avoid lifting very heavy weights - nothing more  than 30 pounds    Drink plenty of fluids while exercising to stay hydrated.  Be careful to avoid  overheating.    ACTIVITIES TO AVOID   Exercises that can make you lose your balance.   Activities that can put your baby at risk i.e. horseback riding, scuba diving, skiing  or snowboarding. Any other sport that puts you at risk for getting hit in the  abdominal area.   Do not use saunas, steam rooms or hot tubs (that have a higher temperature  than 100F)   After the first trimester, avoid exercises that require you to lay flat on your back.   Avoid exceeding a heart rate greater than 140 beats per minute. As long as you are  able to hold a conversation while exercising your heart rate is likely acceptable

## 2024-01-10 NOTE — PROGRESS NOTES
"   Subjective  Patient ID: Zakiya Ozuna is a 28 y.o. female here for Pregnancy Ultrasound (Lmp 10/27/Korey 24/10w5d/Patient says she feels \"hung over\" but feels pretty good as long as she eats/Pregnancy is planned she is here with her  today /Periods are regular /)    Newly Pregnant  Patient's last menstrual period was 10/27/2023 (exact date). giving her an KOREY of 24 and a gestational age of  10 weeks 5days (based on LMP)    Menstrual cycle: stopped OCP in September  and only got 2 menses off.   Pregnancy was planned.   She has started taking a prenatal vitamin    She is C/O amenorrhea  Signs and symptoms of pregnancy:   Breast tenderness: yes  Fatigue: yes  Cramping or Pelvic Pain: no  Spotting or Vaginal Bleeding: no  Nausea or vomiting: mild nausea, managed by eating regularly .    OB History    Para Term  AB Living   1 0 0 0 0 0   SAB IAB Ectopic Multiple Live Births   0 0 0 0 0      # Outcome Date GA Lbr Brayden/2nd Weight Sex Delivery Anes PTL Lv   1 Current                 The following portions of the patient's history were reviewed and updated as appropriate: allergies, current medications, past family history, past medical history, past social history, past surgical history, and problem list.    Perinent hx that may affect pregnancy:  G1        Review of Systems    See HPI for pertinent positives.             /70 (BP Location: Left arm, Patient Position: Sitting, Cuff Size: Standard)   Ht 5' 4\" (1.626 m)   Wt 63 kg (139 lb)   LMP 10/27/2023 (Exact Date)   BMI 23.86 kg/m²   OBGyn Exam      FIRST TRIMESTER OBSTETRIC ULTRASOUND     Patient's last menstrual period was 10/27/2023 (exact date).    INDICATION: Establish Gestational Age       FINDINGS:  See imaging report for details     Additional Findings: Rt adnexal Hypoechoic cycstic lesion 6.82 x 7.28 x 6.89 cm     FHR: 166  IMPRESSION:    Single intrauterine pregnancy of 10 weeks 0days gestational age  Fetal cardiac " activity detected.  Rt adnexal cystic leasion 7 cm seen.  EDC by LMP: 24  EDC by this Ultrasound: 24    Assigning a Final KOREY  Please choose how you are assigning the KOREY: The gestational age by LMP is 9w 0d - 13w 6d and demonstrates 7 or fewer days difference from the gestational age by CRL, therefore the final KOREY will be based on the LMP    Final KOREY: 24 by LMP.    ELIDA Penaloza   CENTER -[facility] [place]  [address]  [address]  [place] [address]  Dept: [phone]  Dept Fax: [phone]  Loc: [phone]  Loc Fax: [phone]  Ultrasound Probe Disinfection    A transvaginal ultrasound was performed.   Prior to use, disinfection was performed with High Level Disinfection Process ([employer]).  Probe serial number: [id number] was used.                        Assessment/Plan:         1. Amenorrhea  -     AMB US OB < 14 weeks single or first gestation level 1    2. Establish gestational age, ultrasound  -     AMB US OB < 14 weeks single or first gestation level 1    3. 10 weeks gestation of pregnancy  Assessment & Plan:  She is a  with Patient's last menstrual period was 10/27/2023 (exact date). Ultrasound today is showing a viable IUP that is c/w GA by LMP. NO change in EDC - 24.  Referral to [facility] given for routine US. F/U for OB intake and then Initial Prenatal Exam.      Orders:  -     Ambulatory Referral to Maternal Fetal Medicine; Future; Expected date: 02/10/2024  -     US OB < 14 weeks with transvaginal; Future; Expected date: 01/10/2024    4. Adnexal mass  Assessment & Plan:  Rt adnexal Hypoechoic cycstic lesion 6.82 x 7.28 x 6.89 cm   Formal US in radiology ordered for further assessment  Torsion precautions reviewed.    Orders:  -     US OB < 14 weeks with transvaginal; Future; Expected date: 01/10/2024        Orders Placed This Encounter   Procedures    US OB < 14 weeks with transvaginal     Ambulatory Referral to Maternal Fetal Medicine    [facility] OB < 14 weeks single or first gestation level 1

## 2024-01-11 ENCOUNTER — HOSPITAL ENCOUNTER (OUTPATIENT)
Dept: ULTRASOUND IMAGING | Facility: HOSPITAL | Age: 29
End: 2024-01-11
Payer: COMMERCIAL

## 2024-01-11 DIAGNOSIS — Z3A.10 10 WEEKS GESTATION OF PREGNANCY: ICD-10-CM

## 2024-01-11 DIAGNOSIS — N94.89 ADNEXAL MASS: ICD-10-CM

## 2024-01-11 PROCEDURE — 76816 OB US FOLLOW-UP PER FETUS: CPT

## 2024-01-12 ENCOUNTER — TELEPHONE (OUTPATIENT)
Dept: HEMATOLOGY ONCOLOGY | Facility: CLINIC | Age: 29
End: 2024-01-12

## 2024-01-12 ENCOUNTER — TELEPHONE (OUTPATIENT)
Dept: OBGYN CLINIC | Facility: CLINIC | Age: 29
End: 2024-01-12

## 2024-01-12 DIAGNOSIS — R19.00 PELVIC MASS: Primary | ICD-10-CM

## 2024-01-12 DIAGNOSIS — Z3A.10 10 WEEKS GESTATION OF PREGNANCY: ICD-10-CM

## 2024-01-12 NOTE — TELEPHONE ENCOUNTER
Patient Call    Who are you speaking with? Spouse Virgilio   If it is not the patient, are they listed on an active communication consent form? Yes   What is the reason for this call? Virgilio calling in regards to patient's upcoming consult with Dr. Garrison on 1/16/24.  Virgilio would like to know if this appointment can be virtual.  Patient lives over an hour away and is concerned with the possibility of snow on Tuesday and is also concerned that this consult will only discuss surgery for patient.  I did inform Virgilio that consultations are usually done in person but I would reach out to the office to confirm.  Virgilio would like to speak to a clinical team member in regards to patient's upcoming consultation.     Does this require a call back? Yes   If a call back is required, please list best call back number [phone]   If a call back is required, advise that a message will be forwarded to their care team and someone will return their call as soon as possible.   Did you relay this information to the patient? Yes

## 2024-01-12 NOTE — TELEPHONE ENCOUNTER
Spoke with Patients  Virgilio- on communication consent regarding referral to gyn onc for patients ovarian cyst.     Patient and her  were told my US tech at [facility] radiology department that everything looked fine and there was nothing to worry about and now they are being referred to someone else     was upset with having to go to another provider to consult about this again, and has already had 2-3 US regarding this.     Wanted to know if it can be virtual since closest appointment date and time has them driving 1 hr away.     Advised  that he can inquire about a virtual appointment to that office, it may be something they offer to avoid the ride out there, but with something like this if it requires surgery we would not be the one to do it, which is why we would consult gyn onc to go over the risk, benefits, and alternatives VS surgical interventions for his wife during her pregnancy.     He will reach out to gyn onc to ask about a virtual appointment.     All questions answered, patient verbalized understanding.

## 2024-01-12 NOTE — RESULT ENCOUNTER NOTE
US done due to large LOC seen on dating US in office.  US confirming large LOC 8.1 cm in largest diameter and has a few thin septations.   Will review with gyn onc to see if consult needed in pregnancy

## 2024-01-12 NOTE — TELEPHONE ENCOUNTER
----- Message from ELIDA Del Angel sent at 1/12/2024 10:55 AM EST -----  After d/w Dr harrell will refer to gyn onc for consult to discuss R/B/A or removal during pregnancy. NO concern for malignancy, likely benign, but may not resolve on its own    Vm Msg left for her to call back

## 2024-01-12 NOTE — RESULT ENCOUNTER NOTE
After d/w Dr harrell will refer to gyn onc for consult to discuss R/B/A or removal during pregnancy. NO concern for malignancy, likely benign, but may not resolve on its own    Vm Msg left for her to call back

## 2024-01-12 NOTE — TELEPHONE ENCOUNTER
I explained earlier to the patient on the phone that typically the consult visit includes an exam and signing paperwork for surgery. I offered her the [place] location on 1/24 with Dr. Zuniga. Can you give them a call back on Monday and see if this location would be preferred?

## 2024-01-15 ENCOUNTER — TELEPHONE (OUTPATIENT)
Dept: HEMATOLOGY ONCOLOGY | Facility: CLINIC | Age: 29
End: 2024-01-15

## 2024-01-15 NOTE — TELEPHONE ENCOUNTER
Appointment Change  Cancel, Reschedule, Change to Virtual      Who are you speaking with? Patient   If it is not the patient, is the caller listed on the communication consent form? N/A   Which provider is the appointment scheduled with? Dr. Garrison   When was the original appointment scheduled?    Please list date and time 01/16/2024 @2:30PM    At which location is the appointment scheduled to take place? [place]   Was the appointment rescheduled?     Was the appointment changed from an in person visit to a virtual visit?    If so, please list the details of the change. Yes, 01/24/2024 @2PM with Dr. Zuniga in [place]   What is the reason for the appointment change? Weather.

## 2024-01-15 NOTE — TELEPHONE ENCOUNTER
Return call placed to patient's . Will discuss with wife and likely call back to reschedule to 1/24/24 with Dr. Zuniga at [place]. Ok to schedule in any new patient appt spots on 1/24/24.

## 2024-01-23 ENCOUNTER — INITIAL PRENATAL (OUTPATIENT)
Dept: OBGYN CLINIC | Facility: CLINIC | Age: 29
End: 2024-01-23

## 2024-01-23 VITALS — WEIGHT: 139 LBS | HEIGHT: 64 IN | BODY MASS INDEX: 23.73 KG/M2

## 2024-01-23 DIAGNOSIS — Z34.01 ENCOUNTER FOR SUPERVISION OF NORMAL FIRST PREGNANCY IN FIRST TRIMESTER: Primary | ICD-10-CM

## 2024-01-23 PROCEDURE — OBC

## 2024-01-23 NOTE — PATIENT INSTRUCTIONS
Congratulations!! Please review our Pregnancy Essential Guide and [facility] L&D Virtual tour from our networks website.     [facility] Pregnancy Essentials Guide  [facility] ([email or web address])     [facility]

## 2024-01-23 NOTE — PROGRESS NOTES
OB INTAKE INTERVIEW  Patient is 28 y.o.y.o. who presents for OB intake at 12wks  She is accompanied by herself during this encounter  The father of her baby (Virgilio) is involved in the pregnancy and is 32 years old.      Last Menstrual Period: 10/27/2023  Ultrasound: Measured 10 weeks 0 days on 1/10/2024 by Lili  Estimated Date of Delivery: 2024 confirmed by 10 week US    Signs/Symptoms of Pregnancy  Current pregnancy symptoms: none  Constipation no  Headaches no  Cramping/spotting no  PICA cravings no    Diabetes-  Body mass index is 23.86 kg/m².  If patient has 1 or more, please order early 1 hour GTT  History of GDM no  BMI >35 no  History of PCOS or current metformin use no  History of LGA/macrosomic infant (4000g/9lbs) no    If patient has 2 or more, please order early 1 hour GTT  BMI>30 no  AMA no  First degree relative with type 2 diabetes no  History of chronic HTN, hyperlipidemia, elevated A1C no  High risk race (, , ,  or ) YES    Hypertension- if you answer yes, please order preeclampsia labs (cbc, comprehensive metabolic panel, urine protein creatinine ratio, uric acid)  History of of chronic HTN no  History of gestational HTN no  History of preeclampsia, eclampsia, or HELLP syndrome no  History of diabetes no  History of lupus, autoimmune disease, kidney disease no    Thyroid- if yes order TSH with reflex T4  History of thyroid disease no    Bleeding Disorder or Hx of DVT-patient or first degree relative with history of. Order the following if not done previously.   (Factor V, antithrombin III, prothrombin gene mutation, protein C and S Ag, lupus anticoagulant, anticardiolipin, beta-2 glycoprotein)   no    OB/GYN-  History of abnormal pap smear no       Date of last pap smear 10/6/21  History of HPV no  History of Herpes/HSV no  History of other STI (gonorrhea, chlamydia, trich) no  History of prior  no  History of prior   no  History of  delivery prior to 36 weeks 6 days no  History of blood transfusion no  Ok for blood transfusion YES    Substance screening- if yes outside of tobacco for her or anyone in her home-order urine drug screen  History of tobacco use no  Currently using tobacco no  Currently using alcohol no  Presently using drugs no  Past drug use  no  IV drug use- no  Partner drug use no  Parent/Family drug use no    MRSA Screening-   Does the pt have a hx of MRSA? no  If yes- please follow MRSA protocol and obtain a nasal swab for MRSA culture    Immunizations:  Influenza vaccine given this season NO  Discussed Tdap vaccine YES  Discussed COVID Vaccine NO    Genetic/MFM-  Do you or your partner have a history of any of the following in yourselves or first degree relatives?  Cystic fibrosis no  Spinal muscular atrophy no  Hemoglobinopathy/Sickle Cell/Thalassemia no  Fragile X Intellectual Disability no    If yes, discuss Carrier Screening and recommend consultation with MFM/genetic counseling and place specific [facility] Referral for.  If no, discuss Carrier Screening being completed once in a lifetime as a standard of care lab test along with Nuchal Ultrasound. Place orders for [id number] and [id number] along with [facility] Referral.  Patient interested: will talk with  first    Appointment at [facility] made YES    Interview education  [facility] Pregnancy Essentials Book reviewed, discussed and attached to their AVS YES    Nurse/Family Partnership- patient may qualify YES; referral placed NO    Prenatal lab work scripts YES  Extra labs ordered:  none    Aspirin/Preeclampsia Screen    Risk Level Risk Factor Recommendation   LOW Prior Uncomplicated full-term delivery no No Aspirin recommendation        MODERATE Nulliparity YES Recommend low-dose aspirin if     BMI>30 no 2 or more moderate risk factors    Family History Preeclampsia (mother/sister) no     35yr old or greater no      or Low Socioeconomic no     IVF  Pregnancy  no     Personal History Risks (low birth weight, prior adverse preg outcome, >10yr preg interval) no         HIGH History of Preeclampsia no Recommend low-dose aspirin if     Multifetal gestation no 1 or more high risk factors    Chronic HTN no     Type 1 or 2 Diabetes no     Renal Disease no     Autoimmune Disease  no      Contraindications to ASA therapy:  NSAID/ ASA allergy: no  Nasal polyps: no  Asthma with history of ASA induced bronchospasm: no  Relative contraindications:  History of GI bleed: no  Active peptic ulcer disease: no  Severe hepatic dysfunction: no    Patient should be recommended to take ASA 162mg during this pregnancy from 12-36wks to lower her risk of preeclampsia: NO      The patient has a history now or in prior pregnancy notable for:  none      Details that I feel the provider should be aware of: Zakiya and Virgilio are expecting their 1st baby! Previous GYN pt with [facility]. She is doing great, no issues. 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 Virgilio works for [employer]. She wants to discuss carrier screening with Virgilio first but has the information.     PN1 visit scheduled. The patient was oriented to our practice, reviewed delivering physicians and [facility] for Delivery. All questions were answered.    Interviewed by: Haleigh Michael MA

## 2024-01-24 ENCOUNTER — CONSULT (OUTPATIENT)
Dept: GYNECOLOGIC ONCOLOGY | Facility: CLINIC | Age: 29
End: 2024-01-24
Payer: COMMERCIAL

## 2024-01-24 VITALS
WEIGHT: 139.6 LBS | SYSTOLIC BLOOD PRESSURE: 126 MMHG | HEIGHT: 64 IN | BODY MASS INDEX: 23.83 KG/M2 | HEART RATE: 110 BPM | OXYGEN SATURATION: 100 % | RESPIRATION RATE: 16 BRPM | TEMPERATURE: 98.2 F | DIASTOLIC BLOOD PRESSURE: 78 MMHG

## 2024-01-24 DIAGNOSIS — Z3A.10 10 WEEKS GESTATION OF PREGNANCY: Primary | ICD-10-CM

## 2024-01-24 DIAGNOSIS — N94.89 ADNEXAL MASS: ICD-10-CM

## 2024-01-24 PROCEDURE — 99244 OFF/OP CNSLTJ NEW/EST MOD 40: CPT | Performed by: OBSTETRICS & GYNECOLOGY

## 2024-01-24 NOTE — ASSESSMENT & PLAN NOTE
Patient is a very pleasant 28-year-old female  1 at 12 weeks gestation now with an incidentally identified 8.1 cm ovarian cyst with a few thin septations.  The patient has no family history of ovarian cancer and she is asymptomatic.    We discussed the risk of ovarian cancer as well as the risk of torsion.  We have stated that the cutoff for standard removal is approximately 8 cm.  If the patient cyst remains roughly that size no further intervention would be likely necessary however we have recommended follow-up ultrasound in approximately 3 weeks.    We talked about if progression of the cyst is noted that surgical intervention would be recommended generally laparoscopy.    We have talked about risk of  labor associated with ovarian torsion and have recommended going to the emergency room if the patient does develop significant pain.    We will see the patient back in 3 weeks with repeat ultrasound for further evaluation

## 2024-01-24 NOTE — PROGRESS NOTES
Assessment/Plan:    Problem List Items Addressed This Visit       10 weeks gestation of pregnancy - Primary    Adnexal mass     Patient is a very pleasant 28-year-old female  1 at 12 weeks gestation now with an incidentally identified 8.1 cm ovarian cyst with a few thin septations.  The patient has no family history of ovarian cancer and she is asymptomatic.    We discussed the risk of ovarian cancer as well as the risk of torsion.  We have stated that the cutoff for standard removal is approximately 8 cm.  If the patient cyst remains roughly that size no further intervention would be likely necessary however we have recommended follow-up ultrasound in approximately 3 weeks.    We talked about if progression of the cyst is noted that surgical intervention would be recommended generally laparoscopy.    We have talked about risk of  labor associated with ovarian torsion and have recommended going to the emergency room if the patient does develop significant pain.    We will see the patient back in 3 weeks with repeat ultrasound for further evaluation         Relevant Orders    US pelvis complete w transvaginal           CHIEF COMPLAINT: 10 weeks gestation with 8 cm adnexal mass            Patient ID: Zakiya Ozuna is a 28 y.o. female  Patient is a pleasant 28-year-old female with a newly diagnosed ovarian mass at the time of pregnancy.    Patient is a very pleasant 28-year-old female  1 with recent pregnancy noted.  She underwent ultrasound scan of the pregnancy with the following results:  IMPRESSION:     Single live intrauterine gestation at 10 weeks 1 days.  KOREY of 2024.     Left ovarian cyst measuring approximately 7.4 x 5.9 x 8.1 cm containing few linear thin septations.      Today, the patient is doing well.  She denies significant abdominal pain, pelvic pain, nausea, vomiting, constipation, diarrhea, fevers, chills, or vaginal bleeding.  Patient has no family history of ovarian cancer  "and no prior history of ovarian cysts.  She is asymptomatic.           Review of Systems   Constitutional: Negative.    HENT: Negative.     Eyes: Negative.    Respiratory: Negative.     Cardiovascular: Negative.    Gastrointestinal: Negative.    Endocrine: Negative.    Genitourinary: Negative.    Musculoskeletal: Negative.    Skin: Negative.    Neurological: Negative.    Hematological: Negative.    Psychiatric/Behavioral: Negative.         Current Outpatient Medications   Medication Sig Dispense Refill    Prenatal MV-Min-Fe Fum-FA-DHA (PRENATAL 1 PO) Take by mouth       No current facility-administered medications for this visit.       No Known Allergies    Past Medical History:   Diagnosis Date    Varicella        Past Surgical History:   Procedure Laterality Date    APPENDECTOMY         OB History          1    Para   0    Term   0       0    AB   0    Living   0         SAB   0    IAB   0    Ectopic   0    Multiple   0    Live Births   0                 Family History   Problem Relation Age of Onset    No Known Problems Mother     No Known Problems Father     No Known Problems Brother     No Known Problems Maternal Grandmother     No Known Problems Maternal Grandfather     Diabetes Paternal Grandmother     No Known Problems Paternal Grandfather     Breast cancer Neg Hx     Colon cancer Neg Hx     Cancer Neg Hx     Ovarian cancer Neg Hx        The following portions of the patient's history were reviewed and updated as appropriate: allergies, current medications, past family history, past medical history, past social history, past surgical history, and problem list.      Objective:    Blood pressure 126/78, pulse (!) 110, temperature 98.2 °F (36.8 °C), temperature source Tympanic, resp. rate 16, height 5' 4\" (1.626 m), weight 63.3 kg (139 lb 9.6 oz), last menstrual period 10/27/2023, SpO2 100%, not currently breastfeeding.  Body mass index is 23.96 kg/m².    Physical Exam  Constitutional:       " "Appearance: She is well-developed.   HENT:      Head: Normocephalic and atraumatic.   Eyes:      Pupils: Pupils are equal, round, and reactive to light.   Cardiovascular:      Rate and Rhythm: Normal rate and regular rhythm.      Heart sounds: Normal heart sounds.   Pulmonary:      Effort: Pulmonary effort is normal. No respiratory distress.      Breath sounds: Normal breath sounds.   Abdominal:      General: Bowel sounds are normal. There is no distension.      Palpations: Abdomen is soft. Abdomen is not rigid.      Tenderness: There is no abdominal tenderness. There is no guarding or rebound.   Genitourinary:     Comments: -Normal external female genitalia, normal Bartholin's and Ridgeway's glands                  -Normal midline urethral meatus. No lesions notes                  -Bladder without fullness mass or tenderness                  -Vagina without lesion or discharge No significant cystocele or rectocele noted                  -Cervix normal appearing without visible lesions                  -Uterus with normal contour consistent with 12 weeks gestation, mobility. No tenderness,                  -Adnexae without palpable mass secondary to enlarged uterus.  No extraovarian nodularity is noted.                  - Anus without fissure of lesion    Musculoskeletal:         General: Normal range of motion.      Cervical back: Normal range of motion and neck supple.   Lymphadenopathy:      Cervical: No cervical adenopathy.      Upper Body:      Right upper body: No supraclavicular adenopathy.      Left upper body: No supraclavicular adenopathy.   Skin:     General: Skin is warm and dry.   Neurological:      Mental Status: She is alert and oriented to person, place, and time.   Psychiatric:         Behavior: Behavior normal.           No results found for: \"\"  No results found for: \"WBC\", \"HGB\", \"HCT\", \"MCV\", \"PLT\"  No results found for: \"NA\", \"K\", \"CL\", \"CO2\", \"ANIONGAP\", \"BUN\", \"CREATININE\", \"GLUCOSE\", " "\"GLUF\", \"CALCIUM\", \"CORRECTEDCA\", \"AST\", \"ALT\", \"ALKPHOS\", \"PROT\", \"BILITOT\", \"EGFR\"     Trend:  No results found for: \"\"     "

## 2024-01-30 ENCOUNTER — ROUTINE PRENATAL (OUTPATIENT)
Dept: PERINATAL CARE | Facility: OTHER | Age: 29
End: 2024-01-30
Payer: COMMERCIAL

## 2024-01-30 VITALS
WEIGHT: 137.2 LBS | SYSTOLIC BLOOD PRESSURE: 120 MMHG | HEART RATE: 105 BPM | BODY MASS INDEX: 23.42 KG/M2 | HEIGHT: 64 IN | DIASTOLIC BLOOD PRESSURE: 70 MMHG

## 2024-01-30 DIAGNOSIS — Z3A.13 13 WEEKS GESTATION OF PREGNANCY: ICD-10-CM

## 2024-01-30 DIAGNOSIS — Z36.82 ENCOUNTER FOR ANTENATAL SCREENING FOR NUCHAL TRANSLUCENCY: Primary | ICD-10-CM

## 2024-01-30 DIAGNOSIS — O34.81 OVARIAN CYST COMPLICATING PREGNANCY, ANTEPARTUM, FIRST TRIMESTER: ICD-10-CM

## 2024-01-30 DIAGNOSIS — Z3A.10 10 WEEKS GESTATION OF PREGNANCY: ICD-10-CM

## 2024-01-30 DIAGNOSIS — N83.209 OVARIAN CYST COMPLICATING PREGNANCY, ANTEPARTUM, FIRST TRIMESTER: ICD-10-CM

## 2024-01-30 PROCEDURE — 76813 OB US NUCHAL MEAS 1 GEST: CPT | Performed by: OBSTETRICS & GYNECOLOGY

## 2024-01-30 PROCEDURE — 36415 COLL VENOUS BLD VENIPUNCTURE: CPT | Performed by: OBSTETRICS & GYNECOLOGY

## 2024-01-30 PROCEDURE — 99243 OFF/OP CNSLTJ NEW/EST LOW 30: CPT | Performed by: OBSTETRICS & GYNECOLOGY

## 2024-01-30 PROCEDURE — 76801 OB US < 14 WKS SINGLE FETUS: CPT | Performed by: OBSTETRICS & GYNECOLOGY

## 2024-01-30 NOTE — LETTER
January 30, 2024     Karlene Corrigan MD  [address]  [address] [place] [id number]    Patient: Zakiya Ozuna   YOB: 1995   Date of Visit: 1/30/2024       Dear Dr. Corrigan:    Thank you for referring Zakiya Ozuna to me for evaluation. Below are my notes for this consultation.    If you have questions, please do not hesitate to call me. I look forward to following your patient along with you.         Sincerely,        Faraz Palacios MD        CC: No Recipients

## 2024-01-30 NOTE — PROGRESS NOTES
"Patient chose to have [employer] Non-invasive Prenatal Screen with fetal sex.   Patient choose billed thru insurance. .   Patient assistance program (PAP) application provided to patient no.  PAP sent with specimen No.     Patient given brochure and is aware [employer] will contact their insurance and coordinate coverage.  Patient made aware she will receive a text message and e-mail from [employer].   Patient informed text/phone call message will come from area code  \"415\".  Provided [employer] Client Services # [phone] and web site at [email or web address]   \"[place] your test online\" card with barcode and test tube ID provided to patient.   Reviewed [employer]'s web site states 5-7 business days for results via their portal.   [employer] message will be sent to patient when [facility] receives results /provider reviews.    2 vials of blood drawn from  right arm by LAUREANO Daugherty RN.   Patient tolerated blood draw without difficulty.  Specimens labeled with patient identifiers (name, date of birth, specimen collection date), order and specimen were verified with patient, packed and sent via [employer] lab .  FED EX  tracking #  [id number]  Copy of lab order scanned to Epic media.    [facility] will have results in approximately 7-10 business days and will call patient or notify via [employer]more.  Patient aware viewing lab result online will reveal fetal sex if ordered.    Patient verbalized understanding of all instructions and no questions at this time.   "

## 2024-02-14 ENCOUNTER — HOSPITAL ENCOUNTER (OUTPATIENT)
Dept: ULTRASOUND IMAGING | Facility: HOSPITAL | Age: 29
Discharge: HOME | End: 2024-02-14
Attending: OBSTETRICS & GYNECOLOGY
Payer: COMMERCIAL

## 2024-02-14 DIAGNOSIS — N94.89 ADNEXAL MASS: ICD-10-CM

## 2024-02-14 PROCEDURE — 76856 US EXAM PELVIC COMPLETE: CPT

## 2024-02-16 ENCOUNTER — INITIAL PRENATAL (OUTPATIENT)
Dept: OBGYN CLINIC | Facility: CLINIC | Age: 29
End: 2024-02-16
Payer: COMMERCIAL

## 2024-02-16 ENCOUNTER — APPOINTMENT (OUTPATIENT)
Dept: LAB | Facility: HOSPITAL | Age: 29
End: 2024-02-16
Payer: COMMERCIAL

## 2024-02-16 VITALS
HEIGHT: 64 IN | SYSTOLIC BLOOD PRESSURE: 110 MMHG | WEIGHT: 143 LBS | HEART RATE: 95 BPM | BODY MASS INDEX: 24.41 KG/M2 | OXYGEN SATURATION: 100 % | DIASTOLIC BLOOD PRESSURE: 70 MMHG

## 2024-02-16 DIAGNOSIS — Z11.3 SCREENING FOR STDS (SEXUALLY TRANSMITTED DISEASES): Primary | ICD-10-CM

## 2024-02-16 DIAGNOSIS — Z34.01 ENCOUNTER FOR SUPERVISION OF NORMAL FIRST PREGNANCY IN FIRST TRIMESTER: ICD-10-CM

## 2024-02-16 DIAGNOSIS — Z34.02 ENCOUNTER FOR SUPERVISION OF NORMAL FIRST PREGNANCY IN SECOND TRIMESTER: ICD-10-CM

## 2024-02-16 PROBLEM — Z3A.16 16 WEEKS GESTATION OF PREGNANCY: Status: ACTIVE | Noted: 2024-01-10

## 2024-02-16 LAB
ABO GROUP BLD: NORMAL
BACTERIA UR QL AUTO: NORMAL /HPF
BASOPHILS # BLD AUTO: 0.05 THOUSANDS/ÂΜL (ref 0–0.1)
BASOPHILS NFR BLD AUTO: 1 % (ref 0–1)
BILIRUB UR QL STRIP: NEGATIVE
BLD GP AB SCN SERPL QL: NEGATIVE
CLARITY UR: CLEAR
COLOR UR: COLORLESS
EOSINOPHIL # BLD AUTO: 0.07 THOUSAND/ÂΜL (ref 0–0.61)
EOSINOPHIL NFR BLD AUTO: 1 % (ref 0–6)
ERYTHROCYTE [DISTWIDTH] IN BLOOD BY AUTOMATED COUNT: 12.8 % (ref 11.6–15.1)
GLUCOSE UR STRIP-MCNC: NEGATIVE MG/DL
HBV SURFACE AG SER QL: NORMAL
HCT VFR BLD AUTO: 33.8 % (ref 34.8–46.1)
HCV AB SER QL: NORMAL
HGB BLD-MCNC: 11.1 G/DL (ref 11.5–15.4)
HGB UR QL STRIP.AUTO: NEGATIVE
HIV 1+2 AB+HIV1 P24 AG SERPL QL IA: NORMAL
HIV 2 AB SERPL QL IA: NORMAL
HIV1 AB SERPL QL IA: NORMAL
HIV1 P24 AG SERPL QL IA: NORMAL
IMM GRANULOCYTES # BLD AUTO: 0.06 THOUSAND/UL (ref 0–0.2)
IMM GRANULOCYTES NFR BLD AUTO: 1 % (ref 0–2)
KETONES UR STRIP-MCNC: NEGATIVE MG/DL
LEUKOCYTE ESTERASE UR QL STRIP: NEGATIVE
LYMPHOCYTES # BLD AUTO: 2.23 THOUSANDS/ÂΜL (ref 0.6–4.47)
LYMPHOCYTES NFR BLD AUTO: 25 % (ref 14–44)
MCH RBC QN AUTO: 29.1 PG (ref 26.8–34.3)
MCHC RBC AUTO-ENTMCNC: 32.8 G/DL (ref 31.4–37.4)
MCV RBC AUTO: 89 FL (ref 82–98)
MONOCYTES # BLD AUTO: 1.13 THOUSAND/ÂΜL (ref 0.17–1.22)
MONOCYTES NFR BLD AUTO: 13 % (ref 4–12)
NEUTROPHILS # BLD AUTO: 5.25 THOUSANDS/ÂΜL (ref 1.85–7.62)
NEUTS SEG NFR BLD AUTO: 59 % (ref 43–75)
NITRITE UR QL STRIP: NEGATIVE
NON-SQ EPI CELLS URNS QL MICRO: NORMAL /HPF
NRBC BLD AUTO-RTO: 0 /100 WBCS
PH UR STRIP.AUTO: 6.5 [PH]
PLATELET # BLD AUTO: 302 THOUSANDS/UL (ref 149–390)
PMV BLD AUTO: 9.6 FL (ref 8.9–12.7)
PROT UR STRIP-MCNC: NEGATIVE MG/DL
RBC # BLD AUTO: 3.82 MILLION/UL (ref 3.81–5.12)
RBC #/AREA URNS AUTO: NORMAL /HPF
RH BLD: NEGATIVE
RUBV IGG SERPL IA-ACNC: 27.5 IU/ML
SL AMB  POCT GLUCOSE, UA: NORMAL
SL AMB POCT URINE PROTEIN: NORMAL
SP GR UR STRIP.AUTO: 1.01 (ref 1–1.03)
SPECIMEN EXPIRATION DATE: NORMAL
TREPONEMA PALLIDUM IGG+IGM AB [PRESENCE] IN SERUM OR PLASMA BY IMMUNOASSAY: NORMAL
UROBILINOGEN UR STRIP-ACNC: <2 MG/DL
WBC # BLD AUTO: 8.79 THOUSAND/UL (ref 4.31–10.16)
WBC #/AREA URNS AUTO: NORMAL /HPF

## 2024-02-16 PROCEDURE — PNV: Performed by: OBSTETRICS & GYNECOLOGY

## 2024-02-16 PROCEDURE — 87389 HIV-1 AG W/HIV-1&-2 AB AG IA: CPT

## 2024-02-16 PROCEDURE — 81001 URINALYSIS AUTO W/SCOPE: CPT

## 2024-02-16 PROCEDURE — 87591 N.GONORRHOEAE DNA AMP PROB: CPT | Performed by: OBSTETRICS & GYNECOLOGY

## 2024-02-16 PROCEDURE — 86803 HEPATITIS C AB TEST: CPT

## 2024-02-16 PROCEDURE — 87491 CHLMYD TRACH DNA AMP PROBE: CPT | Performed by: OBSTETRICS & GYNECOLOGY

## 2024-02-16 PROCEDURE — 87086 URINE CULTURE/COLONY COUNT: CPT

## 2024-02-16 PROCEDURE — 86901 BLOOD TYPING SEROLOGIC RH(D): CPT

## 2024-02-16 PROCEDURE — 81002 URINALYSIS NONAUTO W/O SCOPE: CPT | Performed by: OBSTETRICS & GYNECOLOGY

## 2024-02-16 PROCEDURE — 86762 RUBELLA ANTIBODY: CPT

## 2024-02-16 PROCEDURE — 86780 TREPONEMA PALLIDUM: CPT

## 2024-02-16 PROCEDURE — 85025 COMPLETE CBC W/AUTO DIFF WBC: CPT

## 2024-02-16 PROCEDURE — 86850 RBC ANTIBODY SCREEN: CPT

## 2024-02-16 PROCEDURE — 87340 HEPATITIS B SURFACE AG IA: CPT

## 2024-02-16 PROCEDURE — 36415 COLL VENOUS BLD VENIPUNCTURE: CPT

## 2024-02-16 PROCEDURE — 82105 ALPHA-FETOPROTEIN SERUM: CPT

## 2024-02-16 PROCEDURE — 86900 BLOOD TYPING SEROLOGIC ABO: CPT

## 2024-02-16 NOTE — PROGRESS NOTES
16w0d  Pap 10/06/2021.Negative   GC/CT: Collect today   PN1 Labs: not done yet   Blood Type:      Rhogam:   MFM Level 1:2024  MFM Level 2:  AFP:   28 Week Labs:  TDap:  Flu:  GBS:   Blue Folder: given at  today's visit and reviewed   Yellow Folder:  Ped Referral :  Breast pump: Patient is planning to breast feed the baby .  L&D forms:  Delivery consent:     Patient reports that she is doing very well with the pregnancy so far.  Patient is planning to go do her blood work at the hospital .

## 2024-02-16 NOTE — PROGRESS NOTES
Problem   16 Weeks Gestation of Pregnancy    Blood Type: .       Rhogam:   Pap 10/06/2021. GC/CT   PN1 Labs:     H&H:   28 Week Labs:  AFP:  Level 1:  Level 2:  Tdap:  Flu:   GBS swab:     Blue folder: given   Yellow folder:     Breast pump order:  L&D forms:    Delivery consent:   IOL:            16 weeks gestation of pregnancy  PN1Vivian Sigala is a 28 y.o. year old  at 16w0d who presents for initial prenatal visit. Planned pregnancy  FOB involved Berny   She is a , Berny works for [employer]   1 Dog in the house     Denies complaints. Denies pelvic pain, bleeding, LOF.     Prenatal labs not drawn yet. AFP ordered today    GC/CT sent today.    Patient Education: Patient was counseled regarding diet, exercise, weight gain, foods to avoid, vaccines in pregnancy, aneuploidy screening, travel precautions to include seat belt use and VTE risk reduction.  She has been provided our pregnancy packet which includes pregnancy warning signs,how and when to contact providers, visit intervals, Maternal fetal medicine information, medication recommendations that are safe during pregnancy, dietary suggestions, activity recommendations, breastfeeding information as well as websites for additional information, and delivery location.    Past Medical History:   Diagnosis Date    Varicella      Past Surgical History:   Procedure Laterality Date    APPENDECTOMY       Immunization History   Administered Date(s) Administered    Tuberculin Skin Test-PPD Intradermal 2022         Family History   Problem Relation Age of Onset    No Known Problems Mother     No Known Problems Father     No Known Problems Brother     No Known Problems Maternal Grandmother     No Known Problems Maternal Grandfather     Diabetes Paternal Grandmother     No Known Problems Paternal Grandfather     Breast cancer Neg Hx     Colon cancer Neg Hx     Cancer Neg Hx     Ovarian cancer Neg Hx      Social History     Tobacco Use    Smoking  "status: Never    Smokeless tobacco: Never   Vaping Use    Vaping status: Never Used   Substance Use Topics    Alcohol use: Not Currently    Drug use: Never       Current Outpatient Medications:     Prenatal MV-Min-Fe Fum-FA-DHA (PRENATAL 1 PO), Take by mouth, Disp: , Rfl:   Patient Active Problem List    Diagnosis Date Noted    16 weeks gestation of pregnancy 01/10/2024    Adnexal mass 01/10/2024       No Known Allergies    OB History    Para Term  AB Living   1 0 0 0 0 0   SAB IAB Ectopic Multiple Live Births   0 0 0 0 0      # Outcome Date GA Lbr Brayden/2nd Weight Sex Delivery Anes PTL Lv   1 Current                Vitals:    24 1114   BP: 110/70   BP Location: Left arm   Patient Position: Sitting   Cuff Size: Large   Pulse: 95   SpO2: 100%   Weight: 64.9 kg (143 lb)   Height: 5' 4\" (1.626 m)     Body mass index is 24.55 kg/m².        "

## 2024-02-16 NOTE — ASSESSMENT & PLAN NOTE
PN1Vivian Sigala is a 28 y.o. year old  at 16w0d who presents for initial prenatal visit. Planned pregnancy  FOB involved Berny   She is a , Berny works for [employer]   1 Dog in the house     Denies complaints. Denies pelvic pain, bleeding, LOF.     Prenatal labs not drawn yet. AFP ordered today    GC/CT sent today.    Patient Education: Patient was counseled regarding diet, exercise, weight gain, foods to avoid, vaccines in pregnancy, aneuploidy screening, travel precautions to include seat belt use and VTE risk reduction.  She has been provided our pregnancy packet which includes pregnancy warning signs,how and when to contact providers, visit intervals, Maternal fetal medicine information, medication recommendations that are safe during pregnancy, dietary suggestions, activity recommendations, breastfeeding information as well as websites for additional information, and delivery location.    Past Medical History:   Diagnosis Date    Varicella      Past Surgical History:   Procedure Laterality Date    APPENDECTOMY       Immunization History   Administered Date(s) Administered    Tuberculin Skin Test-PPD Intradermal 2022         Family History   Problem Relation Age of Onset    No Known Problems Mother     No Known Problems Father     No Known Problems Brother     No Known Problems Maternal Grandmother     No Known Problems Maternal Grandfather     Diabetes Paternal Grandmother     No Known Problems Paternal Grandfather     Breast cancer Neg Hx     Colon cancer Neg Hx     Cancer Neg Hx     Ovarian cancer Neg Hx      Social History     Tobacco Use    Smoking status: Never    Smokeless tobacco: Never   Vaping Use    Vaping status: Never Used   Substance Use Topics    Alcohol use: Not Currently    Drug use: Never       Current Outpatient Medications:     Prenatal MV-Min-Fe Fum-FA-DHA (PRENATAL 1 PO), Take by mouth, Disp: , Rfl:   Patient Active Problem List    Diagnosis Date Noted    16  "weeks gestation of pregnancy 01/10/2024    Adnexal mass 01/10/2024       No Known Allergies    OB History    Para Term  AB Living   1 0 0 0 0 0   SAB IAB Ectopic Multiple Live Births   0 0 0 0 0      # Outcome Date GA Lbr Brayden/2nd Weight Sex Delivery Anes PTL Lv   1 Current                Vitals:    24 1114   BP: 110/70   BP Location: Left arm   Patient Position: Sitting   Cuff Size: Large   Pulse: 95   SpO2: 100%   Weight: 64.9 kg (143 lb)   Height: 5' 4\" (1.626 m)     Body mass index is 24.55 kg/m².      "

## 2024-02-16 NOTE — PATIENT INSTRUCTIONS
"Valuable Online Resource:    [facility] pregnancy essential guide    [email or web address]      On the right side of the screen is a 50 page guide providing valuable information about your entire pregnancy.    On the left hand side of the site you will see several other links to great information and resources that [facility] offers     If you click on the tab that says \"Pregnancy and Birth Packet\" this opens another  guide to labor and delivery information as well as breast feeding information,  care, pediatricians, car seat safety and much more     The [facility] Baby [facility] Me [facility] tab has a virtual tour of the new L&D unit, as well as valuable information about classes that are offered, breast feeding support, support groups and much more.     I highly recommend the virtual Breast Feeding class, very informational even if you have breast fed in the past. Check for available dates !    Click around and enjoy all we have to offer!    Please note that all information in regards to locations and visiting hours have not been updated due to COVID    Your delivery location is [facility] @ [address] [address]             Maternal Fetal Medicine     Nuchal Translucency ( NT) ultrasound is offered in the first trimester of pregnancy to assess your developing baby and look for any markers that may indicate a risk for certain chromosomal conditions such as Down's syndrome.  This ultrasound is offered to all pregnant women along with several options for blood screening.     During your ultrasound appointment the Perinatologist will discuss these options and together you can decide which screen is best for you.  We encourage you to view the following video prior to your appointment to learn more:  [email or web address]     Please check your individual insurance plan to determine if the screening is covered, requires " prior authorization or if you will incur any out of pocket cost.   It is also important to know if your insurance company has a preferred in network lab such as [facility] or [facility].     Below is list of CPT codes for blood screening options.   CPT codes are procedure codes that tell your insurance company what testing is being done.     In order for testing to be performed in a timely and efficient manner it is best if you have this information available before your first visit with our office.  Please note, [facility]'s genetic testing division is called [facility].     Sequential Screen - 2 part screen, CPT codes are dependent on the lab used:     [email or web address]    Part 1 code 52254,68091      Part 2 code 82704,24059,29588, 82188     [facility]            Part 1 code  77113                Part 2 code 50681        NIPT (cell free DNA testing)  CPT code 31646        NIPT testing through [facility]/ "[employer] is called [id number].   Please use the  @ [employer]   > click estimate my cost>  pregnancy>   [id number] plus  OR call # [phone] and speak to a .   Be sure to ask about the Every [employer] program for additional financial assistance.     NIPT testing through [facility] is called [facility].  Please use the  @ [employer].     If you have any questions please feel free to reach out to our office at [phone].  Genetic Counseling appointments are available for any patient but strongly encouraged for Moms 35 or older or patients with family history of genetic disorders.     Pregnancy at 15 to 18 Weeks   AMBULATORY CARE:   What changes are happening to your body:  Now that you are in your second trimester, you have more energy. You may also feel hungrier than usual. You may start to experience other symptoms, such as heartburn or dizziness. You may be gaining about ½ to 1 pound a week, and your pregnancy is  beginning to show. You may need to start wearing maternity clothes.  Seek care immediately if:   You have pain or cramping in your abdomen or low back.    You have heavy vaginal bleeding or clotting.    You pass material that looks like tissue or large clots. Collect the material and bring it with you.    Call your doctor or obstetrician if:   You cannot keep food or drinks down, and you are losing weight.    You have light bleeding.    You have chills or a fever.    You have vaginal itching, burning, or pain.    You have yellow, green, white, or foul-smelling vaginal discharge.    You have pain or burning when you urinate, less urine than usual, or pink or bloody urine.    You have questions or concerns about your condition or care.    How to care for yourself at this stage of your pregnancy:       Manage heartburn  by eating 4 or 5 small meals each day instead of large meals. Avoid spicy foods. Avoid eating right before bedtime.         Manage nausea and vomiting.  Avoid fatty and spicy foods. Eat small meals throughout the day instead of large meals. Mari may help to decrease nausea. Ask your healthcare provider about other ways of decreasing nausea and vomiting.    Eat a variety of healthy foods.  Healthy foods include fruits, vegetables, whole-grain breads, low-fat dairy foods, beans, lean meats, and fish. Drink liquids as directed. Ask how much liquid to drink each day and which liquids are best for you. Limit caffeine to less than 200 milligrams each day. Limit your intake of fish to 2 servings each week. Choose fish low in mercury such as canned light tuna, shrimp, salmon, cod, or tilapia. Do not  eat fish high in mercury such as swordfish, tilefish, valeria mackerel, and shark.         Take prenatal vitamins as directed.  Your need for certain vitamins and minerals, such as folic acid, increases during pregnancy. Prenatal vitamins provide some of the extra vitamins and minerals you need. Prenatal vitamins  may also help to decrease the risk of certain birth defects.         Do not smoke.  Smoking increases your risk of a miscarriage and other health problems during your pregnancy. Smoking can cause your baby to be born too early or weigh less at birth. Ask your healthcare provider for information if you need help quitting.    Do not drink alcohol.  Alcohol passes from your body to your baby through the placenta. It can affect your baby's brain development and cause fetal alcohol syndrome (FAS). FAS is a group of conditions that causes mental, behavior, and growth problems.    Talk to your healthcare provider before you take any medicines.  Many medicines may harm your baby if you take them when you are pregnant. Do not take any medicines, vitamins, herbs, or supplements without first talking to your healthcare provider. Never use illegal or street drugs (such as marijuana or cocaine) while you are pregnant.  Safety tips during pregnancy:   Avoid hot tubs and saunas.  Do not use a hot tub or sauna while you are pregnant, especially during your first trimester. Hot tubs and saunas may raise your baby's temperature and increase the risk of birth defects.    Avoid toxoplasmosis.  This is an infection caused by eating raw meat or being around infected cat feces. It can cause birth defects, miscarriages, and other problems. Wash your hands after you touch raw meat. Make sure any meat is well-cooked before you eat it. Avoid raw eggs and unpasteurized milk. Use gloves or ask someone else to clean your cat's litter box while you are pregnant.    Changes that are happening with your baby:  By 18 weeks, your baby may be about 6 inches long from the top of the head to the rump (baby's bottom). Your baby may weigh about 11 ounces. You may be able to feel your baby's movement at about 18 weeks or later. The first movements may not be that noticeable. They may feel like a fluttering sensation. Your baby also makes sucking movements  and can hear certain sounds.  What you need to know about prenatal care:  During the first 28 weeks of your pregnancy, you will see your healthcare provider once a month. Your healthcare provider will check your blood pressure and weight. You may also need any of the following:  A urine test  may also be done to check for sugar and protein. These can be signs of gestational diabetes or infection.    A blood test  may be done to check for anemia (low iron level).    Fundal height check  is a measurement of your uterus to check your baby's growth. This number is usually the same as the number of weeks that you have been pregnant.    An ultrasound  may be done to check your baby's development. Your healthcare provider may be able to tell you what your baby's gender is during the ultrasound.         Your baby's heart rate  will be checked.    © Copyright [employer] 2023 Information is for End User's use only and may not be sold, redistributed or otherwise used for commercial purposes.  The above information is an  only. It is not intended as medical advice for individual conditions or treatments. Talk to your doctor, nurse or pharmacist before following any medical regimen to see if it is safe and effective for you.  Round Ligament Pain   WHAT YOU NEED TO KNOW:   What is round ligament pain?  Round ligament pain is caused when ligaments are stretched as your uterus (womb) gets bigger during pregnancy. Round ligaments are found on each side of your uterus. They are bands of tissue that hold the uterus in place. Round ligament pain happens most often during the second trimester. It is a normal part of pregnancy and should stop by the third trimester. The pain is not serious and will not hurt your baby.       What are the signs and symptoms of round ligament pain?   Pain on one or both sides of your lower abdomen or groin that may move up to your hip    Spasms in the muscles in your abdomen    Pain that lasts  a few seconds    Pain that happens when you exercise, sneeze, change positions, or stand quickly    How is round ligament pain diagnosed?  Your healthcare provider will examine you and ask about your pain. Tell the provider when the pain started, and if you feel it on one or both sides. Your provider may ask if anything helps the pain or makes it worse.   What can I do to manage my pain?  Round ligament pain does not need to be treated. The following may help make you more comfortable:  Rest as often as you can.  Rest can help relieve round ligament pain. You might want to lie on the side that has pain. Place a pillow under your abdomen. Keep another pillow between your knees.    Move slowly.  Sudden movement can stretch the ligaments and cause pain. Stand, sit, and change positions slowly. Try to tighten the muscles in your hips before you sneeze or laugh. You can also sit down and bring your knees up toward your abdomen. This can help relieve tension on the ligaments.    Exercise as directed.  Gentle exercise can keep the ligaments loose and strengthen core (abdominal) muscles. An example is swimming, or a yoga program designed for pregnancy. Ask your healthcare provider which exercises are safe for you and how often to exercise. For most healthy women, a good goal is to try to get at least 30 minutes of exercise every day. If activity causes pain, try not to walk too long or too far at one time. Break your exercise up into short amounts.    Apply a warm compress to the area.  Warmth can relieve pain and muscle spasms. Ask your healthcare provider if you can take a warm bath or use a heating pad. Keep all heat settings low. High heat can be dangerous for your baby. Do not sit in a hot tub or use hot water in your bath. You may also be able to massage the area gently while you are applying heat. Massage can help relieve pain.    Ask about pain medicines.  Ask your healthcare provider before you take any medicine  during pregnancy, including over-the-counter pain medicines. Your healthcare provider may recommend acetaminophen to relieve the pain. Ask how much to take and how often to take it. Follow directions. Acetaminophen can cause liver damage. Too much medicine can be harmful to your baby.    When should I contact my healthcare provider?   You have pain that is spreading to other parts of your body.    You have new or worsening pain.    You have pain that lasts longer than a few minutes at a time.    You have questions or concerns about your condition or care.    CARE AGREEMENT:   You have the right to help plan your care. Learn about your health condition and how it may be treated. Discuss treatment options with your healthcare providers to decide what care you want to receive. You always have the right to refuse treatment. The above information is an  only. It is not intended as medical advice for individual conditions or treatments. Talk to your doctor, nurse or pharmacist before following any medical regimen to see if it is safe and effective for you.  © Copyright [employer] 2023 Information is for End User's use only and may not be sold, redistributed or otherwise used for commercial purposes.  [place] Contractions   AMBULATORY CARE:   [place] contractions  are tightening and squeezing of the muscles of your uterus (womb) during pregnancy. The uterine muscles control the uterus. [place] contractions stop on their own. They are not true labor contractions and do not cause your cervix (opening to your uterus) to dilate (open).  Common symptoms include the following:   Pain or discomfort in your groin or lower abdomen that comes and goes    Your contractions are short, and do not last longer each time they happen    Your contractions do not get closer together each time    Your contractions do not get stronger or more painful each time    Your contractions stop when you change your  position or rest    Seek care immediately if:   You have bleeding from your vagina.    You have fluid leaking from your vagina that does not stop.    You feel a gush of fluid from your vagina.    Your contractions happen every 5 minutes or sooner, and last for more than 60 seconds.    Your contractions begin to feel stronger or more painful.    You feel a change in your baby's movement, or you feel fewer than 6 to 10 movements in an hour.    Call your doctor or obstetrician if:   You have a fever.    You have questions or concerns about your condition or care.    Treatment for [place] contractions  may include pain medicine to relieve discomfort or pain or sedatives to relax the muscles of your uterus. If you are dehydrated, he or she may give you fluids through an IV or tell you to drink liquids.  Self-care:   Change your activity or your position  when you feel contractions begin. Walk if you have been lying or sitting. Lie down if you have been standing or walking. True labor will not stop by changing your position or activity.    Take a warm bath  to relax your body.    Drink more liquids  to prevent dehydration. Ask how much liquid to drink each day and which liquids are best for you.    Practice your labor breathing  to decrease your discomfort. This may help you get ready for true labor. Take slow, deep breaths, or fast, short breaths. Ask your healthcare provider how to practice labor breathing.    Follow up with your doctor or obstetrician as directed:  Write down your questions so you remember to ask them during your visits.  © Copyright [employer] 2023 Information is for End User's use only and may not be sold, redistributed or otherwise used for commercial purposes.  The above information is an  only. It is not intended as medical advice for individual conditions or treatments. Talk to your doctor, nurse or pharmacist before following any medical regimen to see if it is safe and  effective for you.

## 2024-02-17 LAB
BACTERIA UR CULT: NORMAL
C TRACH DNA SPEC QL NAA+PROBE: NEGATIVE
N GONORRHOEA DNA SPEC QL NAA+PROBE: NEGATIVE

## 2024-02-18 PROBLEM — O26.899 RH NEGATIVE STATE IN ANTEPARTUM PERIOD: Status: ACTIVE | Noted: 2024-02-18

## 2024-02-18 PROBLEM — Z67.91 RH NEGATIVE STATE IN ANTEPARTUM PERIOD: Status: ACTIVE | Noted: 2024-02-18

## 2024-02-18 LAB
2ND TRIMESTER 4 SCREEN SERPL-IMP: NORMAL
AFP ADJ MOM SERPL: 0.86
AFP INTERP AMN-IMP: NORMAL
AFP INTERP SERPL-IMP: NORMAL
AFP INTERP SERPL-IMP: NORMAL
AFP SERPL-MCNC: 30 NG/ML
AGE AT DELIVERY: 29.7 YR
GA METHOD: NORMAL
GA: 16 WEEKS
IDDM PATIENT QL: NO
MULTIPLE PREGNANCY: NO
NEURAL TUBE DEFECT RISK FETUS: NORMAL %

## 2024-02-21 ENCOUNTER — OFFICE VISIT (OUTPATIENT)
Dept: GYNECOLOGIC ONCOLOGY | Facility: CLINIC | Age: 29
End: 2024-02-21
Payer: COMMERCIAL

## 2024-02-21 ENCOUNTER — TELEPHONE (OUTPATIENT)
Dept: HEMATOLOGY ONCOLOGY | Facility: CLINIC | Age: 29
End: 2024-02-21

## 2024-02-21 VITALS
HEART RATE: 99 BPM | SYSTOLIC BLOOD PRESSURE: 110 MMHG | DIASTOLIC BLOOD PRESSURE: 58 MMHG | OXYGEN SATURATION: 99 % | HEIGHT: 64 IN | WEIGHT: 143 LBS | BODY MASS INDEX: 24.41 KG/M2

## 2024-02-21 DIAGNOSIS — N94.89 ADNEXAL MASS: Primary | ICD-10-CM

## 2024-02-21 PROCEDURE — 99213 OFFICE O/P EST LOW 20 MIN: CPT | Performed by: OBSTETRICS & GYNECOLOGY

## 2024-02-21 NOTE — ASSESSMENT & PLAN NOTE
CC:   Chief Complaint   Patient presents with   • Cold Symptoms     runny nose and productive cough x Monday, fever, 3 episodes of vomiting and right earache x last night        Established Patient     SUBJECTIVE  HPI:Campos Banerjee is a 8 year old male who presents today with     Sinus Problem  This is a new problem. Episode onset: x 4 days. Associated symptoms include congestion and coughing.       His past medical history is significant for:  History reviewed. No pertinent past medical history.    Allergies: ALLERGIES:  No Known Allergies    Current Outpatient Medications   Medication Sig Dispense Refill   • albuterol 108 (90 Base) MCG/ACT inhaler Inhale 2 puffs into the lungs every 4 hours as needed for Shortness of Breath or Wheezing. 1 Inhaler 2     No current facility-administered medications for this visit.         Review of Systems:  Review of Systems   HENT: Positive for congestion.    Respiratory: Positive for cough.    All other systems reviewed and are negative.      All other systems reviewed are negative    OBJECTIVE  Vitals:   Visit Vitals  Pulse 92   Temp 97.3 °F (36.3 °C) (Temporal)   Resp 24   Wt 37.6 kg (83 lb)   SpO2 99%       Physical Exam:  Physical Exam  Vitals and nursing note reviewed.   Constitutional:       Appearance: He is well-developed.   HENT:      Right Ear: Tympanic membrane normal.      Left Ear: Tympanic membrane normal.      Nose: Congestion and rhinorrhea present.      Mouth/Throat:      Mouth: Mucous membranes are moist.      Pharynx: Oropharynx is clear.   Eyes:      Conjunctiva/sclera: Conjunctivae normal.   Cardiovascular:      Rate and Rhythm: Normal rate and regular rhythm.      Heart sounds: S1 normal and S2 normal.   Pulmonary:      Effort: Pulmonary effort is normal.      Breath sounds: Normal breath sounds and air entry.   Musculoskeletal:      Cervical back: Normal range of motion and neck supple. No rigidity.   Neurological:      Mental Status: He is alert.  Patient had a 8 cm adnexal mass with a few thin septations which has resolved on present ultrasound.  No further therapy is indicated.         Results for orders placed or performed in visit on 12/17/21   POCT SARS-COV-2 ANTIGEN   Result Value    POCT SARS-COV-2 ANTIGEN Not Detected   POCT INFLUENZA A/B   Result Value    Rapid Influenza A Ag Negative    Rapid Influenza B Ag Positive (A)         ASSESSMENT/PLAN  Acute influenza B  I had a discussion with patient/parent regarding his/her condition.  Discussed typical course of illness which usually lasts 5-10 days.  Recommend close observation and symptomatic therapy.  Patient should increase oral fluid intake to maintain hydration and take Tylenol/Motrin for fever and body aches.  If symptoms are not improving or worsening in anyway patient should seek immediate reevaluation either here in [facility] or with PCP.  If  necessary, patient should also consider going to ER.  Patient/parent should specifically watch for symptoms of dehydration, increased lethargy,  persistent vomiting, chest tightness or shortness of breath.  Those symptoms should trigger immediate reevaluation.  Patient/parent verbalized understanding.      Peter Mckeon MD  12/17/2021        If symptoms should suddenly change or worsen, seek immediate reevaluation with PCP or return to Immediate Care.  The patient verbalized understanding.    Peter Mckeon MD  12/17/2021

## 2024-02-21 NOTE — LETTER
February 21, 2024     ELIDA Colby    Patient: Zakiya Ozuna   YOB: 1995   Date of Visit: 2/21/2024       Dear Dr. Clemente:    Thank you for referring Zakiya Ozuna to me for evaluation. Below are my notes for this consultation.    If you have questions, please do not hesitate to call me. I look forward to following your patient along with you.         Sincerely,        Puneet Zuniga MD        CC: MD Leonarda Oviedo MD Richard M Boulay, MD  2/21/2024  3:58 PM  Sign when Signing Visit  Assessment/Plan:    Problem List Items Addressed This Visit       Adnexal mass - Primary     Patient had a 8 cm adnexal mass with a few thin septations which has resolved on present ultrasound.  No further therapy is indicated.              CHIEF COMPLAINT: Pregnancy with 8 cm adnexal mass        Patient ID: Zakiya Ozuna is a 28 y.o. female  Patient is a very pleasant 28-year-old female in her second trimester for follow-up for an ovarian cyst.  This was approximately 7 to 8 cm in her first trimester scan.  We recommended follow-up.  Recent pelvic ultrasound reveals the following:  UTERUS:  Gravid uterus, not assessed on today's exam.     OVARIES/ADNEXA:  Right ovary: 2.7 x 3.9 x 4.3 cm. 23.9 mL.  Significant interval decrease in size of the previously documented cyst. Currently measures 2.6 x 2.6 x 1.9 cm.     Left ovary: 2.2 x 2.4 x 1.7 cm. 4.6 mL.  Ovarian Doppler flow is within normal limits.  No suspicious ovarian or adnexal abnormality.     OTHER:  No free fluid or loculated fluid collections.     IMPRESSION:     Significant interval decrease in size of the previously dominant right ovarian cyst. No additional follow-up recommended at this time.    Today, the patient is doing well.  She denies significant abdominal pain, pelvic pain, nausea, vomiting, constipation, diarrhea, fevers, chills, or vaginal bleeding.           The following portions of the patient's history  "were reviewed and updated as appropriate: allergies, current medications, past family history, past medical history, past social history, past surgical history, and problem list.    Review of Systems   Constitutional: Negative.    HENT: Negative.     Eyes: Negative.    Respiratory: Negative.     Cardiovascular: Negative.    Gastrointestinal: Negative.    Endocrine: Negative.    Genitourinary: Negative.    Musculoskeletal: Negative.    Skin: Negative.    Neurological: Negative.    Hematological: Negative.    Psychiatric/Behavioral: Negative.         Current Outpatient Medications   Medication Sig Dispense Refill   • Prenatal MV-Min-Fe Fum-FA-DHA (PRENATAL 1 PO) Take by mouth       No current facility-administered medications for this visit.           Objective:    Blood pressure 110/58, pulse 99, height 5' 4\" (1.626 m), weight 64.9 kg (143 lb), last menstrual period 10/27/2023, SpO2 99%, not currently breastfeeding.  Body mass index is 24.55 kg/m².  Body surface area is 1.7 meters squared.    Physical Exam  Constitutional:       Appearance: She is well-developed.   HENT:      Head: Normocephalic and atraumatic.   Eyes:      Pupils: Pupils are equal, round, and reactive to light.   Cardiovascular:      Rate and Rhythm: Normal rate and regular rhythm.      Heart sounds: Normal heart sounds.   Pulmonary:      Effort: Pulmonary effort is normal. No respiratory distress.      Breath sounds: Normal breath sounds.   Abdominal:      General: Bowel sounds are normal. There is no distension.      Palpations: Abdomen is soft. Abdomen is not rigid.      Tenderness: There is no abdominal tenderness. There is no guarding or rebound.      Comments: Consistent with 16 weeks gestation   Genitourinary:     Comments: Deferred  Musculoskeletal:         General: Normal range of motion.      Cervical back: Normal range of motion and neck supple.   Lymphadenopathy:      Cervical: No cervical adenopathy.      Upper Body:      Right upper " "body: No supraclavicular adenopathy.      Left upper body: No supraclavicular adenopathy.   Skin:     General: Skin is warm and dry.   Neurological:      Mental Status: She is alert and oriented to person, place, and time.   Psychiatric:         Behavior: Behavior normal.         No results found for: \"\"  No results found for: \"NA\", \"K\", \"CL\", \"CO2\", \"ANIONGAP\", \"BUN\", \"CREATININE\", \"GLUCOSE\", \"GLUF\", \"CALCIUM\", \"CORRECTEDCA\", \"AST\", \"ALT\", \"ALKPHOS\", \"PROT\", \"BILITOT\", \"EGFR\"  Lab Results   Component Value Date    WBC 8.79 02/16/2024    HGB 11.1 (L) 02/16/2024    HCT 33.8 (L) 02/16/2024    MCV 89 02/16/2024     02/16/2024     Lab Results   Component Value Date    NEUTROABS 5.25 02/16/2024        Trend:  No results found for: \"\"              "

## 2024-02-21 NOTE — TELEPHONE ENCOUNTER
Appointment Confirmation   Who are you speaking with? Patient   If it is not the patient, are they listed on an active communication consent form? N/A   Which provider is the appointment scheduled with?  Dr. Zuniga   When is the appointment scheduled?  Please list date and time 2/21/24 330   At which location is the appointment scheduled to take place? [place]   Did caller verbalize understanding of appointment details? Yes

## 2024-02-21 NOTE — PROGRESS NOTES
Assessment/Plan:    Problem List Items Addressed This Visit       Adnexal mass - Primary     Patient had a 8 cm adnexal mass with a few thin septations which has resolved on present ultrasound.  No further therapy is indicated.              CHIEF COMPLAINT: Pregnancy with 8 cm adnexal mass        Patient ID: Zakiya Ozuna is a 28 y.o. female  Patient is a very pleasant 28-year-old female in her second trimester for follow-up for an ovarian cyst.  This was approximately 7 to 8 cm in her first trimester scan.  We recommended follow-up.  Recent pelvic ultrasound reveals the following:  UTERUS:  Gravid uterus, not assessed on today's exam.     OVARIES/ADNEXA:  Right ovary: 2.7 x 3.9 x 4.3 cm. 23.9 mL.  Significant interval decrease in size of the previously documented cyst. Currently measures 2.6 x 2.6 x 1.9 cm.     Left ovary: 2.2 x 2.4 x 1.7 cm. 4.6 mL.  Ovarian Doppler flow is within normal limits.  No suspicious ovarian or adnexal abnormality.     OTHER:  No free fluid or loculated fluid collections.     IMPRESSION:     Significant interval decrease in size of the previously dominant right ovarian cyst. No additional follow-up recommended at this time.    Today, the patient is doing well.  She denies significant abdominal pain, pelvic pain, nausea, vomiting, constipation, diarrhea, fevers, chills, or vaginal bleeding.           The following portions of the patient's history were reviewed and updated as appropriate: allergies, current medications, past family history, past medical history, past social history, past surgical history, and problem list.    Review of Systems   Constitutional: Negative.    HENT: Negative.     Eyes: Negative.    Respiratory: Negative.     Cardiovascular: Negative.    Gastrointestinal: Negative.    Endocrine: Negative.    Genitourinary: Negative.    Musculoskeletal: Negative.    Skin: Negative.    Neurological: Negative.    Hematological: Negative.    Psychiatric/Behavioral: Negative.    "      Current Outpatient Medications   Medication Sig Dispense Refill    Prenatal MV-Min-Fe Fum-FA-DHA (PRENATAL 1 PO) Take by mouth       No current facility-administered medications for this visit.           Objective:    Blood pressure 110/58, pulse 99, height 5' 4\" (1.626 m), weight 64.9 kg (143 lb), last menstrual period 10/27/2023, SpO2 99%, not currently breastfeeding.  Body mass index is 24.55 kg/m².  Body surface area is 1.7 meters squared.    Physical Exam  Constitutional:       Appearance: She is well-developed.   HENT:      Head: Normocephalic and atraumatic.   Eyes:      Pupils: Pupils are equal, round, and reactive to light.   Cardiovascular:      Rate and Rhythm: Normal rate and regular rhythm.      Heart sounds: Normal heart sounds.   Pulmonary:      Effort: Pulmonary effort is normal. No respiratory distress.      Breath sounds: Normal breath sounds.   Abdominal:      General: Bowel sounds are normal. There is no distension.      Palpations: Abdomen is soft. Abdomen is not rigid.      Tenderness: There is no abdominal tenderness. There is no guarding or rebound.      Comments: Consistent with 16 weeks gestation   Genitourinary:     Comments: Deferred  Musculoskeletal:         General: Normal range of motion.      Cervical back: Normal range of motion and neck supple.   Lymphadenopathy:      Cervical: No cervical adenopathy.      Upper Body:      Right upper body: No supraclavicular adenopathy.      Left upper body: No supraclavicular adenopathy.   Skin:     General: Skin is warm and dry.   Neurological:      Mental Status: She is alert and oriented to person, place, and time.   Psychiatric:         Behavior: Behavior normal.         No results found for: \"\"  No results found for: \"NA\", \"K\", \"CL\", \"CO2\", \"ANIONGAP\", \"BUN\", \"CREATININE\", \"GLUCOSE\", \"GLUF\", \"CALCIUM\", \"CORRECTEDCA\", \"AST\", \"ALT\", \"ALKPHOS\", \"PROT\", \"BILITOT\", \"EGFR\"  Lab Results   Component Value Date    WBC 8.79 02/16/2024    HGB " "11.1 (L) 02/16/2024    HCT 33.8 (L) 02/16/2024    MCV 89 02/16/2024     02/16/2024     Lab Results   Component Value Date    NEUTROABS 5.25 02/16/2024        Trend:  No results found for: \"\"              "

## 2024-03-11 PROBLEM — Z3A.19 19 WEEKS GESTATION OF PREGNANCY: Status: ACTIVE | Noted: 2024-01-10

## 2024-03-11 NOTE — PATIENT INSTRUCTIONS
Pregnancy at 19 to 22 Weeks   WHAT YOU NEED TO KNOW:   What changes are happening with my body?  Now that you are in your second trimester, you have more energy. You may also be feeling hungrier than usual. You may be gaining about ½ to 1 pound a week, and your pregnancy is beginning to show. You may need to start wearing maternity clothes. As your baby gets larger, you may have other symptoms. These may include body aches or stretch marks on your abdomen, breasts, thighs, or buttocks.  How do I care for myself at this stage of my pregnancy?       Eat a variety of healthy foods.  Healthy foods include fruits, vegetables, whole-grain breads, low-fat dairy foods, beans, lean meats, and fish. Drink liquids as directed. Ask how much liquid to drink each day and which liquids are best for you. Limit caffeine to less than 200 milligrams each day. Limit your intake of fish to 2 servings each week. Choose fish low in mercury such as canned light tuna, shrimp, salmon, cod, or tilapia. Do not  eat fish high in mercury such as swordfish, tilefish, valeria mackerel, and shark.         Take prenatal vitamins as directed.  Your need for certain vitamins and minerals, such as folic acid, increases during pregnancy. Prenatal vitamins provide some of the extra vitamins and minerals you need. Prenatal vitamins may also help to decrease the risk of certain birth defects.         Talk to your healthcare provider about exercise.  Moderate exercise can help you stay fit. Your healthcare provider will help you plan an exercise program that is safe for you during pregnancy.         Do not smoke.  Smoking increases your risk of a miscarriage and other health problems during your pregnancy. Smoking can cause your baby to be born too early or weigh less at birth. Ask your healthcare provider for information if you need help quitting.    Do not drink alcohol.  Alcohol passes from your body to your baby through the placenta. It can affect your  baby's brain development and cause fetal alcohol syndrome (FAS). FAS is a group of conditions that causes mental, behavior, and growth problems.    Talk to your healthcare provider before you take any medicines.  Many medicines may harm your baby if you take them when you are pregnant. Do not take any medicines, vitamins, herbs, or supplements without first talking to your healthcare provider. Never use illegal or street drugs (such as marijuana or cocaine) while you are pregnant.  What are some safety tips during pregnancy?   Avoid hot tubs and saunas.  Do not use a hot tub or sauna while you are pregnant, especially during your first trimester. Hot tubs and saunas may raise your baby's temperature and increase the risk of birth defects.    Avoid toxoplasmosis.  This is an infection caused by eating raw meat or being around infected cat feces. It can cause birth defects, miscarriages, and other problems. Wash your hands after you touch raw meat. Make sure any meat is well-cooked before you eat it. Avoid raw eggs and unpasteurized milk. Use gloves or ask someone else to clean your cat's litter box while you are pregnant.       What changes are happening with my baby?  By 22 weeks, your baby is about 8 inches long from the top of the head to the rump (baby's bottom). Your baby also weighs about 1 pound. Your baby is becoming much more active. You may be able to feel the baby move inside you now. The first movements may not be that noticeable. They may feel like a fluttering sensation. As time goes on, your baby's movements will become stronger and more noticeable.  What do I need to know about prenatal care?  During the first 28 weeks of your pregnancy, you will see your healthcare provider once a month. Your healthcare provider will check your blood pressure and weight. You may also need the following:  A urine test  may also be done to check for sugar and protein. These can be signs of gestational diabetes or  infection. Protein in your urine may also be a sign of preeclampsia. Preeclampsia is a condition that can develop during week 20 or later of your pregnancy. It causes high blood pressure, and it can cause problems with your kidneys and other organs.    Fundal height  is a measurement of your uterus to check your baby's growth. This number is usually the same as the number of weeks that you have been pregnant.    A fetal ultrasound  shows pictures of your baby inside your uterus. It shows your baby's development. The movement and position of your baby can also be seen. Your healthcare provider may be able to tell you what your baby's gender is during the ultrasound.         Your baby's heart rate  will be checked.    When should I seek immediate care?   You develop a severe headache that does not go away.    You have new or increased vision changes, such as blurred or spotted vision.    You have new or increased swelling in your face or hands.    You have vaginal spotting or bleeding.    Your water broke or you feel warm water gushing or trickling from your vagina.    When should I call my doctor or obstetrician?   You have abdominal cramps, pressure, or tightening.    You have a change in vaginal discharge.    You cannot keep food or drinks down, and you are losing weight.    You have chills or a fever.    You have vaginal itching, burning, or pain.    You have yellow, green, white, or foul-smelling vaginal discharge.    You have pain or burning when you urinate, less urine than usual, or pink or bloody urine.    You have questions or concerns about your condition or care.    CARE AGREEMENT:   You have the right to help plan your care. Learn about your health condition and how it may be treated. Discuss treatment options with your healthcare providers to decide what care you want to receive. You always have the right to refuse treatment. The above information is an  only. It is not intended as medical  advice for individual conditions or treatments. Talk to your doctor, nurse or pharmacist before following any medical regimen to see if it is safe and effective for you.  © Copyright [employer] 2023 Information is for End User's use only and may not be sold, redistributed or otherwise used for commercial purposes.

## 2024-03-13 ENCOUNTER — ROUTINE PRENATAL (OUTPATIENT)
Dept: OBGYN CLINIC | Facility: CLINIC | Age: 29
End: 2024-03-13
Payer: COMMERCIAL

## 2024-03-13 VITALS
SYSTOLIC BLOOD PRESSURE: 110 MMHG | HEART RATE: 103 BPM | WEIGHT: 140 LBS | BODY MASS INDEX: 24.03 KG/M2 | DIASTOLIC BLOOD PRESSURE: 82 MMHG

## 2024-03-13 DIAGNOSIS — Z34.02 ENCOUNTER FOR SUPERVISION OF NORMAL FIRST PREGNANCY IN SECOND TRIMESTER: Primary | ICD-10-CM

## 2024-03-13 DIAGNOSIS — O26.899 RH NEGATIVE STATE IN ANTEPARTUM PERIOD: ICD-10-CM

## 2024-03-13 DIAGNOSIS — Z67.91 RH NEGATIVE STATE IN ANTEPARTUM PERIOD: ICD-10-CM

## 2024-03-13 DIAGNOSIS — Z3A.19 19 WEEKS GESTATION OF PREGNANCY: ICD-10-CM

## 2024-03-13 LAB
SL AMB  POCT GLUCOSE, UA: NORMAL
SL AMB POCT URINE PROTEIN: NORMAL

## 2024-03-13 PROCEDURE — 81002 URINALYSIS NONAUTO W/O SCOPE: CPT | Performed by: NURSE PRACTITIONER

## 2024-03-13 PROCEDURE — PNV: Performed by: NURSE PRACTITIONER

## 2024-03-13 NOTE — PROGRESS NOTES
Problem   19 Weeks Gestation of Pregnancy    Blood Type: O neg   Rhogam:   Pap 10/06/2021. GC/CT neg  PN1 Labs:  done     28 Week Labs:  AFP: neg    Level 2: 3/22  Tdap:  Flu: declined  GBS swab:     Blue folder: given   Yellow folder:     Breast pump order:  L&D forms:    Delivery consent:   IOL:            19 weeks gestation of pregnancy  Feels well. Denies LOF/CTX/VB. Discussed fetal awareness. No concerns.

## 2024-03-21 ENCOUNTER — TELEPHONE (OUTPATIENT)
Age: 29
End: 2024-03-21

## 2024-03-21 ENCOUNTER — TELEPHONE (OUTPATIENT)
Facility: HOSPITAL | Age: 29
End: 2024-03-21

## 2024-03-21 NOTE — TELEPHONE ENCOUNTER
Called and lvm for patient in regards to appointment on 3/22 at 8:00 am in [place]. Due to schedule change patient needs to be rescheduled. Rescheduled patient for 3/27 in [place] at 10:15 am. Provided patient MFM office number to confirm appointment.

## 2024-03-21 NOTE — TELEPHONE ENCOUNTER
A user error has taken place: encounter opened in error, closed for administrative reasons.   Addend current encounter

## 2024-03-21 NOTE — TELEPHONE ENCOUNTER
Advised pt I would keep 3/26 appt on their schedule in case it could not be r/s to another spot. After call, pt cancelled the 3/26 appt through [employer]

## 2024-03-21 NOTE — TELEPHONE ENCOUNTER
Patient called regarding r/s of Detailed US. Pt expressed frustration, advised they are unable to make 3/26 appt & refuses to go to [place] office. Advised pt next available appt is 4/9 past the recommended timeframe for Detailed US (completed prior to 3/29).   Please review for alternate appt or if we can push to 4/9. Advised we have limited availability & can not guarantee location however pt expressed it is an [employer] scheduling error & requested [facility] or [facility] if at all possible.

## 2024-03-22 ENCOUNTER — TELEPHONE (OUTPATIENT)
Facility: HOSPITAL | Age: 29
End: 2024-03-22

## 2024-03-22 NOTE — TELEPHONE ENCOUNTER
Called and spoke to patient in regards to 3/22 8:00 AM in [place] appointment that was rescheduled to 3/27 10:15 AM in [place] due to schedule change. Patient canceled via my chart stating that location was too far. Offered patient LYFT services but patient declined. Patient is now rescheduled to 4/9 12:45 PM in [place]. Patient is aware of new appointment and confirmed.

## 2024-04-02 ENCOUNTER — CLINICAL SUPPORT (OUTPATIENT)
Dept: POSTPARTUM | Facility: CLINIC | Age: 29
End: 2024-04-02

## 2024-04-02 DIAGNOSIS — Z32.2 ENCOUNTER FOR CHILDBIRTH INSTRUCTION: Primary | ICD-10-CM

## 2024-04-09 ENCOUNTER — ROUTINE PRENATAL (OUTPATIENT)
Dept: PERINATAL CARE | Facility: OTHER | Age: 29
End: 2024-04-09
Payer: COMMERCIAL

## 2024-04-09 VITALS
HEIGHT: 64 IN | SYSTOLIC BLOOD PRESSURE: 118 MMHG | BODY MASS INDEX: 26.15 KG/M2 | WEIGHT: 153.2 LBS | HEART RATE: 81 BPM | DIASTOLIC BLOOD PRESSURE: 50 MMHG

## 2024-04-09 DIAGNOSIS — O34.82 OVARIAN CYST IN PREGNANCY IN SECOND TRIMESTER: ICD-10-CM

## 2024-04-09 DIAGNOSIS — Z36.86 ENCOUNTER FOR ANTENATAL SCREENING FOR CERVICAL LENGTH: ICD-10-CM

## 2024-04-09 DIAGNOSIS — Z3A.23 23 WEEKS GESTATION OF PREGNANCY: ICD-10-CM

## 2024-04-09 DIAGNOSIS — Z36.3 ENCOUNTER FOR ANTENATAL SCREENING FOR MALFORMATION USING ULTRASOUND: Primary | ICD-10-CM

## 2024-04-09 DIAGNOSIS — N83.209 OVARIAN CYST IN PREGNANCY IN SECOND TRIMESTER: ICD-10-CM

## 2024-04-09 PROCEDURE — 76811 OB US DETAILED SNGL FETUS: CPT | Performed by: OBSTETRICS & GYNECOLOGY

## 2024-04-09 PROCEDURE — 76817 TRANSVAGINAL US OBSTETRIC: CPT | Performed by: OBSTETRICS & GYNECOLOGY

## 2024-04-09 PROCEDURE — 99213 OFFICE O/P EST LOW 20 MIN: CPT | Performed by: OBSTETRICS & GYNECOLOGY

## 2024-04-09 NOTE — PROGRESS NOTES
The patient was seen today for an ultrasound.  Please see ultrasound report (located under Ob Procedures) for additional details.   Thank you very much for allowing us to participate in the care of this very nice patient.  Should you have any questions, please do not hesitate to contact me.     Harry Mckenna MD FACOG  Attending Physician, Maternal-Fetal Medicine  [facility]

## 2024-04-09 NOTE — PROGRESS NOTES
Ultrasound Probe Disinfection    A transvaginal ultrasound was performed.   Prior to use, disinfection was performed with High Level Disinfection Process ([employer]).  Probe serial number [id number]: [id number]   was used.      Leslie Ramírez  04/09/24  12:49 PM

## 2024-04-10 ENCOUNTER — ROUTINE PRENATAL (OUTPATIENT)
Dept: OBGYN CLINIC | Facility: CLINIC | Age: 29
End: 2024-04-10
Payer: COMMERCIAL

## 2024-04-10 VITALS — WEIGHT: 156.2 LBS | DIASTOLIC BLOOD PRESSURE: 70 MMHG | BODY MASS INDEX: 26.81 KG/M2 | SYSTOLIC BLOOD PRESSURE: 132 MMHG

## 2024-04-10 DIAGNOSIS — Z34.02 ENCOUNTER FOR SUPERVISION OF NORMAL FIRST PREGNANCY IN SECOND TRIMESTER: Primary | ICD-10-CM

## 2024-04-10 DIAGNOSIS — Z3A.23 23 WEEKS GESTATION OF PREGNANCY: ICD-10-CM

## 2024-04-10 LAB
SL AMB  POCT GLUCOSE, UA: NORMAL
SL AMB POCT URINE PROTEIN: NORMAL

## 2024-04-10 PROCEDURE — 81002 URINALYSIS NONAUTO W/O SCOPE: CPT | Performed by: OBSTETRICS & GYNECOLOGY

## 2024-04-10 PROCEDURE — PNV: Performed by: OBSTETRICS & GYNECOLOGY

## 2024-04-10 NOTE — PROGRESS NOTES
23 weeks gestation of pregnancy  28 week labs given  PNC follow up scheduled  Signs of fetal kick counts,  labor, diet, nutrition discussed.    TWG 17lbs  Patient is active - walks 40 minutes daily      Patient is here for prenatal ob visit today.    GA: 23w5d  KOREY: 24    Urine: Protein neg / Glucose neg  Denies loss of fluid, vaginal bleeding or contractions.  Good fetal movement.   Patient is having a girl!  Labs utd  -NIPS low risk  -AFP negative screen  -28 week labs ordered today  Blue folder given at PN1  20 week growth with MFM     No question's or concerns at this time.

## 2024-04-10 NOTE — ASSESSMENT & PLAN NOTE
28 week labs given  PNC follow up scheduled  Signs of fetal kick counts,  labor, diet, nutrition discussed.    TWG 17lbs  Patient is active - walks 40 minutes daily

## 2024-04-26 ENCOUNTER — APPOINTMENT (OUTPATIENT)
Dept: LAB | Facility: HOSPITAL | Age: 29
End: 2024-04-26
Attending: OBSTETRICS & GYNECOLOGY
Payer: COMMERCIAL

## 2024-04-26 DIAGNOSIS — Z34.02 ENCOUNTER FOR SUPERVISION OF NORMAL FIRST PREGNANCY IN SECOND TRIMESTER: ICD-10-CM

## 2024-04-26 LAB
BASOPHILS # BLD AUTO: 0.05 THOUSANDS/ÂΜL (ref 0–0.1)
BASOPHILS NFR BLD AUTO: 1 % (ref 0–1)
EOSINOPHIL # BLD AUTO: 0.07 THOUSAND/ÂΜL (ref 0–0.61)
EOSINOPHIL NFR BLD AUTO: 1 % (ref 0–6)
ERYTHROCYTE [DISTWIDTH] IN BLOOD BY AUTOMATED COUNT: 13 % (ref 11.6–15.1)
GLUCOSE 1H P 50 G GLC PO SERPL-MCNC: 114 MG/DL (ref 70–134)
HCT VFR BLD AUTO: 36.7 % (ref 34.8–46.1)
HGB BLD-MCNC: 11.8 G/DL (ref 11.5–15.4)
IMM GRANULOCYTES # BLD AUTO: 0.17 THOUSAND/UL (ref 0–0.2)
IMM GRANULOCYTES NFR BLD AUTO: 2 % (ref 0–2)
LYMPHOCYTES # BLD AUTO: 2.26 THOUSANDS/ÂΜL (ref 0.6–4.47)
LYMPHOCYTES NFR BLD AUTO: 21 % (ref 14–44)
MCH RBC QN AUTO: 29.1 PG (ref 26.8–34.3)
MCHC RBC AUTO-ENTMCNC: 32.2 G/DL (ref 31.4–37.4)
MCV RBC AUTO: 91 FL (ref 82–98)
MONOCYTES # BLD AUTO: 0.77 THOUSAND/ÂΜL (ref 0.17–1.22)
MONOCYTES NFR BLD AUTO: 7 % (ref 4–12)
NEUTROPHILS # BLD AUTO: 7.44 THOUSANDS/ÂΜL (ref 1.85–7.62)
NEUTS SEG NFR BLD AUTO: 68 % (ref 43–75)
NRBC BLD AUTO-RTO: 0 /100 WBCS
PLATELET # BLD AUTO: 334 THOUSANDS/UL (ref 149–390)
PMV BLD AUTO: 9.4 FL (ref 8.9–12.7)
RBC # BLD AUTO: 4.05 MILLION/UL (ref 3.81–5.12)
TREPONEMA PALLIDUM IGG+IGM AB [PRESENCE] IN SERUM OR PLASMA BY IMMUNOASSAY: NORMAL
WBC # BLD AUTO: 10.76 THOUSAND/UL (ref 4.31–10.16)

## 2024-04-26 PROCEDURE — 82950 GLUCOSE TEST: CPT

## 2024-04-26 PROCEDURE — 85025 COMPLETE CBC W/AUTO DIFF WBC: CPT

## 2024-04-26 PROCEDURE — 86780 TREPONEMA PALLIDUM: CPT

## 2024-04-26 PROCEDURE — 36415 COLL VENOUS BLD VENIPUNCTURE: CPT

## 2024-05-07 PROBLEM — Z3A.28 28 WEEKS GESTATION OF PREGNANCY: Status: ACTIVE | Noted: 2024-01-10

## 2024-05-07 NOTE — PROGRESS NOTES
27w5d  Pap 10/06/2021. Negative GC/CT:2024 Neg / Neg   PN1 Labs: 2024  Blood Type: O  Negative     Rhogam:   MFM Level 1:2024  MFM Level 2:2024  AFP: 2024  28 Week Labs:2024 Normal   TDap:  Flu:  GBS:   Blue Folder: given   Yellow Folder: Given   Ped Referral : given   Breast pump: order on 2024  L&D forms:  Delivery consent: Done on 2024    Patient reports lots of fetal movements with no concerns .  Patient will come Friday for her Rhogam injection .

## 2024-05-07 NOTE — ASSESSMENT & PLAN NOTE
Zakiya Ozuna is a 28 y.o.  27w5d  TWG 7.802 kg (17 lb 3.2 oz)  Feels well. Denies LOF/CTX/VB. No concerns.   AFP completed Neg  28 week labs NML  Vaccines discussed    labor precautions reviewed.   Encouraged adequate hydration and nutrition  Pregnancy Essential guide and [facility] Me [facility] web site recommended.    The patient was counseled about the labor process. She was counseled regarding potential reasons that she may need a  section including arrest of dilation/descent, non-reassuring fetal status, or worsening maternal status.   She was counseled on the risks of  including bleeding, infection, and injury to surrounding structures including the bowel and bladder. She was counseled that there are medical and surgical methods to manage excessive postpartum bleeding. She was counseled that in the event of excessive blood loss, she may require blood transfusion which includes a small risk of blood borne diseases such as hepatitis and HIV. The patient is OK with receiving a blood transfusion if necessary.    She was counseled about the possible need for operative delivery using the vacuum and the indications for doing so.  The patient had an opportunity to ask questions and signed consent.

## 2024-05-07 NOTE — PATIENT INSTRUCTIONS
Pregnancy at 27 to 30 Weeks   AMBULATORY CARE:   What changes are happening to your body:  You may notice new symptoms such as shortness of breath, heartburn, or swelling of your ankles and feet. You may also have trouble sleeping or contractions.  Seek care immediately if:   You develop a severe headache that does not go away.    You have new or increased vision changes, such as blurred or spotted vision.    You have new or increased swelling in your face or hands.    You have vaginal spotting or bleeding.    Your water broke or you feel warm water gushing or trickling from your vagina.    Call your doctor or obstetrician if:   You have more than 5 contractions in 1 hour.    You notice any changes in your baby's movements.    You have abdominal cramps, pressure, or tightening.    You have a change in vaginal discharge.    You have chills or a fever.    You have vaginal itching, burning, or pain.    You have yellow, green, white, or foul-smelling vaginal discharge.    You have pain or burning when you urinate, less urine than usual, or pink or bloody urine.    You have questions or concerns about your condition or care.    How to care for yourself at this stage of your pregnancy:       Eat a variety of healthy foods.  Healthy foods include fruits, vegetables, whole-grain breads, low-fat dairy foods, beans, lean meats, and fish. Drink liquids as directed. Ask how much liquid to drink each day and which liquids are best for you. Limit caffeine to less than 200 milligrams each day. Limit your intake of fish to 2 servings each week. Choose fish low in mercury such as canned light tuna, shrimp, salmon, cod, or tilapia. Do not  eat fish high in mercury such as swordfish, tilefish, valeria mackerel, and shark.         Manage heartburn  by eating 4 or 5 small meals each day instead of large meals. Avoid spicy food.         Manage swelling  by lying down and putting your feet up.         Take prenatal vitamins as directed.   Your need for certain vitamins and minerals, such as folic acid, increases during pregnancy. Prenatal vitamins provide some of the extra vitamins and minerals you need. Prenatal vitamins may also help to decrease the risk of certain birth defects.         Talk to your healthcare provider about exercise.  Moderate exercise can help you stay fit. Your healthcare provider will help you plan an exercise program that is safe for you during pregnancy.         Do not smoke.  Smoking increases your risk of a miscarriage and other health problems during your pregnancy. Smoking can cause your baby to be born too early or weigh less at birth. Ask your healthcare provider for information if you need help quitting.    Do not drink alcohol.  Alcohol passes from your body to your baby through the placenta. It can affect your baby's brain development and cause fetal alcohol syndrome (FAS). FAS is a group of conditions that causes mental, behavior, and growth problems.    Talk to your healthcare provider before you take any medicines.  Many medicines may harm your baby if you take them when you are pregnant. Do not take any medicines, vitamins, herbs, or supplements without first talking to your healthcare provider. Never use illegal or street drugs (such as marijuana or cocaine) while you are pregnant.  Safety tips during pregnancy:   Avoid hot tubs and saunas.  Do not use a hot tub or sauna while you are pregnant, especially during your first trimester. Hot tubs and saunas may raise your baby's temperature and increase the risk of birth defects.    Avoid toxoplasmosis.  This is an infection caused by eating raw meat or being around infected cat feces. It can cause birth defects, miscarriages, and other problems. Wash your hands after you touch raw meat. Make sure any meat is well-cooked before you eat it. Avoid raw eggs and unpasteurized milk. Use gloves or ask someone else to clean your cat's litter box while you are pregnant.        Changes that are happening with your baby:  By 30 weeks, your baby may weigh more than 3 pounds. Your baby may be about 11 inches long from the top of the head to the rump (baby's bottom). Your baby's eyes open and close now. Your baby's kicks and movements are more forceful at this time.  What you need to know about prenatal care:  Your healthcare provider will check your blood pressure and weight. You may also need the following:  Blood tests  may be done to check for anemia or blood type.    A urine test  may also be done to check for sugar and protein. These can be signs of gestational diabetes or infection. Protein in your urine may also be a sign of preeclampsia. Preeclampsia is a condition that can develop during week 20 or later of your pregnancy. It causes high blood pressure, and it can cause problems with your kidneys and other organs.    A Tdap vaccine and flu vaccine  may be recommended by your healthcare provider.    A gestational diabetes screen  may be done. Your healthcare provider may order either a 1-step or 2-step oral glucose tolerance test (OGTT).     1-step OGTT:  Your blood sugar level will be tested after you have not eaten for 8 hours (fasting). You will then be given a glucose drink. Your level will be tested again 1 hour and 2 hours after you finish the drink.    2-step OGTT:  You do not have to fast for the first part of the test. You will have the glucose drink at any time of day. Your blood sugar level will be checked 1 hour later. If your blood sugar is higher than a certain level, another test will be ordered. You will fast and your blood sugar level will be tested. You will have the glucose drink. Your blood will be tested again 1 hour, 2 hours, and 3 hours after you finish the glucose drink.    Fundal height  is a measurement of your uterus to check your baby's growth. This number is usually the same as the number of weeks that you have been pregnant. Your healthcare provider  may also check your baby's position.    Your baby's heart rate  will be checked.    Follow up with your doctor or obstetrician as directed:  Write down your questions so you remember to ask them during your visits.  © Copyright [employer] 2023 Information is for End User's use only and may not be sold, redistributed or otherwise used for commercial purposes.  The above information is an  only. It is not intended as medical advice for individual conditions or treatments. Talk to your doctor, nurse or pharmacist before following any medical regimen to see if it is safe and effective for you.  Kick Counts in Pregnancy   AMBULATORY CARE:   Kick counts  measure how much your baby is moving in your womb. A kick from your baby can be felt as a twist, turn, swish, roll, or jab. It is common to feel your baby kicking at 26 to 28 weeks of pregnancy. You may feel your baby kick as early as 20 weeks of pregnancy. You may want to start counting at 28 weeks.   Contact your doctor immediately if:   You feel a change in the number of kicks or movements of your baby.     You feel fewer than 10 kicks within 2 hours.     You have questions or concerns about your baby's movements.    Why measure kick counts:  Your baby's movement may provide information about your baby's health. He or she may move less, or not at all, if there are problems. Your baby may move less if he or she is not getting enough oxygen or nutrition from the placenta. Do not smoke while you are pregnant. Smoking decreases the amount of oxygen that gets to your baby. Talk to your healthcare provider if you need help to quit smoking. Tell your healthcare provider as soon as you feel a change in your baby's movements.  When to measure kick counts:   Measure kick counts at the same time every day.      Measure kick counts when your baby is awake and most active. Your baby may be most active in the evening.    How to measure kick counts:  Check that your baby  is awake before you measure kick counts. You can wake up your baby by lightly pushing on your belly, walking, or drinking something cold. Your healthcare provider may tell you different ways to measure kick counts. You may be told to do the following:  Use a chart or clock to keep track of the time you start and finish counting.     Sit in a chair or lie on your left side.     Place your hands on the largest part of your belly.     Count until you reach 10 kicks. Write down how much time it takes to count 10 kicks.     It may take 30 minutes to 2 hours to count 10 kicks. It should not take more than 2 hours to count 10 kicks.    Follow up with your doctor as directed:  Write down your questions so you remember to ask them during your visits.   © Copyright [employer] 2023 Information is for End User's use only and may not be sold, redistributed or otherwise used for commercial purposes.  The above information is an  only. It is not intended as medical advice for individual conditions or treatments. Talk to your doctor, nurse or pharmacist before following any medical regimen to see if it is safe and effective for you.      Perineal Massage    Perineal massage is recommended starting after 34 weeks in order to reduce risks of perineal tearing during childbirth.  You have been provided and instructional sheet in your yellow 28 week prenatal packet.       Having Your Baby: The Labor Process   AMBULATORY CARE:   The labor process  is a series of 3 stages that your body goes through to deliver your baby. It is not known for sure what causes labor to begin. Hormones made by you and your baby and changes in your uterus may help labor to start. Labor usually starts 2 weeks before or after your due date. Most women do not have their baby exactly on their due date.   Call your obstetrician if:   You have vaginal spotting or bleeding.    Your water broke or you feel warm water gushing or trickling from your  vagina.    You have more than 5 contractions in 1 hour.    You have bloody mucus or show.    You notice any changes in your baby's movements.     You have abdominal cramps, pressure, or tightening.    You have a change in vaginal discharge.    You have questions or concerns about your condition or care.    First stage of labor:  The first stage of labor includes latent (early) labor and active labor. This stage may last up to 12 hours if this is your first pregnancy. It may last up to 10 hours if you delivered a baby before. Your uterus will contract to prepare your cervix for delivery and to push your baby out of the birth canal. Your cervix will dilate (widen) and efface (soften and become thinner). Your contractions may last from 30 to 60 seconds. The contractions usually start in the back and move to the front. You may also have a pink, clear, or slightly bloody discharge called bloody show. Bloody show is caused by the movement of a mucus plug from your cervix. During pregnancy the mucus plug blocks your cervix to prevent it from opening  What to do during early labor:  Early labor may last for several hours. You will most likely be at home during early labor. Rest as much as possible while you are at home. Have someone rub your back. It may be helpful to place ice packs on your lower back. Go for a short walk if you are able. Drink water and suck on ice chips. Ask your healthcare provider if it is okay to eat during early labor.   How to know when you are in active labor:  This stage may last up to 12 hours if this is your first pregnancy. It may last up to 10 hours if you delivered a baby before. Your contractions will get stronger, last longer, and happen more frequently. They will also become more intense and painful. Time your contractions from the beginning of one to the beginning of the next. Write this information down for 1 hour. Your healthcare provider will tell you when to go to the hospital or  birthing center. This will be based on how many minutes apart your contractions are.   Second stage of labor:  The second stage is the time between full cervix dilation and the birth of your baby. Your cervix will be completely dilated to 10 centimeters and your baby will be ready to be born. The second stage usually lasts 20 minutes to 2 hours. It may last up to 3 hours if this is your first baby.  You may be given antibiotics to fight a bacterial infection you have or prevent an infection during delivery.    Healthcare providers will help you find a position for giving birth that is comfortable for you. You can lie on your back, have your feet up in stirrups, or squat.    You may feel pressure on your rectum and the urge to push. This pressure is caused by the movement of your baby's head down the birth canal. Your healthcare provider will have you push when you feel the urge. He or she will guide your baby out of the birth canal. Forceps or suction may be used to help deliver your baby. You may also need an episiotomy (incision) to make the vaginal opening larger. This will make more room for your baby. Your perineum will be protected during delivery. This may be with a warm compress or massage of the area.    At least 1 minute after your baby is born, your healthcare provider will put clamps on the umbilical cord. The cord will then be cut. Your baby may be placed on your chest right away. He or she may also start breastfeeding.    Third stage of labor:  The placenta (afterbirth) is delivered during this stage. After you give birth, your uterus will continue to contract to help push out the placenta. These contractions will begin 5 to 30 minutes after you give birth. Your healthcare provider will tell you when to push. You may have chills or shakiness during this stage. You may be given medicine to help prevent heavy bleeding that can happen during this stage.  How to manage labor pain:  Pain can be managed  naturally or with medicines. You can naturally manage pain by using relaxation methods and controlled breathing. There are different types of medicines that can be used to relieve pain while you are in labor. These medicines may be given through an IV or an epidural (thin catheter in your lower back). Talk with your healthcare about your options for pain medicines if you choose to use them. Tell your provider if you prefer not to have any pain control medicines during labor.   © Copyright [employer] 2023 Information is for End User's use only and may not be sold, redistributed or otherwise used for commercial purposes.  The above information is an  only. It is not intended as medical advice for individual conditions or treatments. Talk to your doctor, nurse or pharmacist before following any medical regimen to see if it is safe and effective for you.

## 2024-05-08 ENCOUNTER — ROUTINE PRENATAL (OUTPATIENT)
Dept: OBGYN CLINIC | Facility: CLINIC | Age: 29
End: 2024-05-08
Payer: COMMERCIAL

## 2024-05-08 VITALS
SYSTOLIC BLOOD PRESSURE: 122 MMHG | HEIGHT: 64 IN | BODY MASS INDEX: 27.66 KG/M2 | OXYGEN SATURATION: 100 % | DIASTOLIC BLOOD PRESSURE: 70 MMHG | WEIGHT: 162 LBS | HEART RATE: 83 BPM

## 2024-05-08 DIAGNOSIS — Z34.02 ENCOUNTER FOR SUPERVISION OF NORMAL FIRST PREGNANCY IN SECOND TRIMESTER: Primary | ICD-10-CM

## 2024-05-08 DIAGNOSIS — Z3A.28 28 WEEKS GESTATION OF PREGNANCY: ICD-10-CM

## 2024-05-08 LAB
DME PARACHUTE DELIVERY DATE REQUESTED: NORMAL
DME PARACHUTE ITEM DESCRIPTION: NORMAL
DME PARACHUTE ORDER STATUS: NORMAL
DME PARACHUTE SUPPLIER NAME: NORMAL
DME PARACHUTE SUPPLIER PHONE: NORMAL
SL AMB  POCT GLUCOSE, UA: NORMAL
SL AMB POCT URINE PROTEIN: NORMAL

## 2024-05-08 PROCEDURE — 81002 URINALYSIS NONAUTO W/O SCOPE: CPT | Performed by: OBSTETRICS & GYNECOLOGY

## 2024-05-08 PROCEDURE — PNV: Performed by: OBSTETRICS & GYNECOLOGY

## 2024-05-08 NOTE — PROGRESS NOTES
Problem   28 Weeks Gestation of Pregnancy    Blood Type: O neg   Rhogam:   Pap 10/06/2021. GC/CT neg  PN1 Labs:  done     28 Week Labs:  AFP: neg    Level 2: 3/22  Tdap:  Flu: declined  GBS swab:     Blue folder: given   Yellow folder: given    Breast pump order:  L&D forms:    Delivery consent: signed  IOL:            28 weeks gestation of pregnancy  Zakiya Ozuna is a 28 y.o.  27w5d  TWG 7.802 kg (17 lb 3.2 oz)  Feels well. Denies LOF/CTX/VB. No concerns.   AFP completed Neg  28 week labs NML  Vaccines discussed    labor precautions reviewed.   Encouraged adequate hydration and nutrition  Pregnancy Essential guide and [facility] web site recommended.    The patient was counseled about the labor process. She was counseled regarding potential reasons that she may need a  section including arrest of dilation/descent, non-reassuring fetal status, or worsening maternal status.   She was counseled on the risks of  including bleeding, infection, and injury to surrounding structures including the bowel and bladder. She was counseled that there are medical and surgical methods to manage excessive postpartum bleeding. She was counseled that in the event of excessive blood loss, she may require blood transfusion which includes a small risk of blood borne diseases such as hepatitis and HIV. The patient is OK with receiving a blood transfusion if necessary.    She was counseled about the possible need for operative delivery using the vacuum and the indications for doing so.  The patient had an opportunity to ask questions and signed consent.

## 2024-05-10 ENCOUNTER — CLINICAL SUPPORT (OUTPATIENT)
Dept: OBGYN CLINIC | Facility: CLINIC | Age: 29
End: 2024-05-10
Payer: COMMERCIAL

## 2024-05-10 VITALS
DIASTOLIC BLOOD PRESSURE: 74 MMHG | SYSTOLIC BLOOD PRESSURE: 120 MMHG | HEIGHT: 64 IN | WEIGHT: 162 LBS | OXYGEN SATURATION: 98 % | HEART RATE: 85 BPM | BODY MASS INDEX: 27.66 KG/M2

## 2024-05-10 DIAGNOSIS — Z67.91 RH NEGATIVE STATE IN ANTEPARTUM PERIOD, SECOND TRIMESTER: Primary | ICD-10-CM

## 2024-05-10 DIAGNOSIS — O26.892 RH NEGATIVE STATE IN ANTEPARTUM PERIOD, SECOND TRIMESTER: Primary | ICD-10-CM

## 2024-05-10 PROCEDURE — 96372 THER/PROPH/DIAG INJ SC/IM: CPT

## 2024-05-15 LAB
DME PARACHUTE DELIVERY DATE ACTUAL: NORMAL
DME PARACHUTE DELIVERY DATE REQUESTED: NORMAL
DME PARACHUTE ITEM DESCRIPTION: NORMAL
DME PARACHUTE ORDER STATUS: NORMAL
DME PARACHUTE SUPPLIER NAME: NORMAL
DME PARACHUTE SUPPLIER PHONE: NORMAL

## 2024-05-19 PROBLEM — Z3A.29 29 WEEKS GESTATION OF PREGNANCY: Status: ACTIVE | Noted: 2024-01-10

## 2024-05-19 PROBLEM — O34.80 OVARIAN CYST AFFECTING PREGNANCY, ANTEPARTUM: Status: RESOLVED | Noted: 2024-01-10 | Resolved: 2024-05-19

## 2024-05-19 PROBLEM — N83.209 OVARIAN CYST AFFECTING PREGNANCY, ANTEPARTUM: Status: RESOLVED | Noted: 2024-01-10 | Resolved: 2024-05-19

## 2024-05-19 PROBLEM — O34.80 OVARIAN CYST AFFECTING PREGNANCY, ANTEPARTUM: Status: ACTIVE | Noted: 2024-01-10

## 2024-05-20 ENCOUNTER — ROUTINE PRENATAL (OUTPATIENT)
Dept: OBGYN CLINIC | Facility: CLINIC | Age: 29
End: 2024-05-20
Payer: COMMERCIAL

## 2024-05-20 VITALS — WEIGHT: 162.8 LBS | DIASTOLIC BLOOD PRESSURE: 70 MMHG | BODY MASS INDEX: 27.94 KG/M2 | SYSTOLIC BLOOD PRESSURE: 124 MMHG

## 2024-05-20 DIAGNOSIS — Z23 ENCOUNTER FOR IMMUNIZATION: ICD-10-CM

## 2024-05-20 DIAGNOSIS — O26.899 RH NEGATIVE STATE IN ANTEPARTUM PERIOD: ICD-10-CM

## 2024-05-20 DIAGNOSIS — Z67.91 RH NEGATIVE STATE IN ANTEPARTUM PERIOD: ICD-10-CM

## 2024-05-20 DIAGNOSIS — Z34.03 ENCOUNTER FOR SUPERVISION OF NORMAL FIRST PREGNANCY, THIRD TRIMESTER: ICD-10-CM

## 2024-05-20 DIAGNOSIS — Z3A.29 29 WEEKS GESTATION OF PREGNANCY: Primary | ICD-10-CM

## 2024-05-20 LAB
SL AMB  POCT GLUCOSE, UA: NORMAL
SL AMB POCT URINE PROTEIN: NORMAL

## 2024-05-20 PROCEDURE — PNV: Performed by: OBSTETRICS & GYNECOLOGY

## 2024-05-20 PROCEDURE — 81002 URINALYSIS NONAUTO W/O SCOPE: CPT | Performed by: OBSTETRICS & GYNECOLOGY

## 2024-05-20 NOTE — ASSESSMENT & PLAN NOTE
32 week PNC follow up to complete anatomy scan, follow growth  28 week labs wnl  Rhogam: given 5/10/24

## 2024-05-20 NOTE — PROGRESS NOTES
29 weeks gestation of pregnancy  32 week PNC follow up to complete anatomy scan, follow growth  28 week labs wnl  Rhogam: given 5/10/24        Patient is here for prenatal ob visit today.    GA: 39w3d  KOREY: 24    Urine: Protein neg / Glucose neg  Denies loss of fluid, vaginal bleeding or contractions.  Good fetal movement.   Patient is having a girl  28 week labs completed  Blue folder given at PN1  Yellow folder given   Delivery consent signed  Birth plan not yet returned  Breast pump ordered  Peds referral placed  Tdap declined  Rhogam given. 5/10/24. O negative.     No question's or concerns at this time.

## 2024-06-11 PROBLEM — Z3A.32 32 WEEKS GESTATION OF PREGNANCY: Status: ACTIVE | Noted: 2024-01-10

## 2024-06-12 ENCOUNTER — ROUTINE PRENATAL (OUTPATIENT)
Dept: OBGYN CLINIC | Facility: CLINIC | Age: 29
End: 2024-06-12
Payer: COMMERCIAL

## 2024-06-12 VITALS
HEART RATE: 76 BPM | SYSTOLIC BLOOD PRESSURE: 118 MMHG | WEIGHT: 166 LBS | DIASTOLIC BLOOD PRESSURE: 70 MMHG | HEIGHT: 64 IN | BODY MASS INDEX: 28.34 KG/M2

## 2024-06-12 DIAGNOSIS — O26.899 RH NEGATIVE STATE IN ANTEPARTUM PERIOD: ICD-10-CM

## 2024-06-12 DIAGNOSIS — Z3A.32 32 WEEKS GESTATION OF PREGNANCY: ICD-10-CM

## 2024-06-12 DIAGNOSIS — Z67.91 RH NEGATIVE STATE IN ANTEPARTUM PERIOD: ICD-10-CM

## 2024-06-12 DIAGNOSIS — Z34.03 ENCOUNTER FOR SUPERVISION OF NORMAL FIRST PREGNANCY, THIRD TRIMESTER: Primary | ICD-10-CM

## 2024-06-12 LAB
SL AMB POCT COLOR,UA: NORMAL
SL AMB POCT URINE PROTEIN: NORMAL

## 2024-06-12 PROCEDURE — PNV: Performed by: NURSE PRACTITIONER

## 2024-06-12 PROCEDURE — 81002 URINALYSIS NONAUTO W/O SCOPE: CPT | Performed by: NURSE PRACTITIONER

## 2024-06-12 NOTE — ASSESSMENT & PLAN NOTE
She feels well. She has been having some lower bilateral pulling sensations when she walks. We discussed a maternity belt for support. She will continue to monitor for issues. We discussed adequate hydration especially next week when the weather will be getting very warm. She denies LOF/CTX/VB. She did not have any concerns today. We discussed fetal kick counting. Overview charting updated today.

## 2024-06-12 NOTE — PROGRESS NOTES
Problem   32 Weeks Gestation of Pregnancy    Blood Type: O neg   Rhogam: given 5/10  Pap 10/06/2021. GC/CT neg  PN1 Labs:  done     28 Week Labs:nml  AFP: neg    Level 2: 3/22  FG at 32w on 6/18  Tdap: declined  Flu: declined  GBS swab:     Blue folder: given   Yellow folder: given    Breast pump order:ordered  L&D forms: working on them    Delivery consent: signed  IOL:            32 weeks gestation of pregnancy  She feels well. She has been having some lower bilateral pulling sensations when she walks. We discussed a maternity belt for support. She will continue to monitor for issues. We discussed adequate hydration especially next week when the weather will be getting very warm. She denies LOF/CTX/VB. She did not have any concerns today. We discussed fetal kick counting. Overview charting updated today.

## 2024-06-17 PROBLEM — Z3A.33 33 WEEKS GESTATION OF PREGNANCY: Status: ACTIVE | Noted: 2024-01-10

## 2024-06-17 NOTE — PATIENT INSTRUCTIONS
Nonstress Test for Pregnancy   WHAT YOU NEED TO KNOW:   What do I need to know about a nonstress test?  A nonstress test measures your baby's heart rate and movements. Nonstress means that no stress will be placed on your baby during the test.  How do I prepare for a nonstress test?  Your healthcare provider will talk to you about how to prepare for this test. Your provider may tell you to eat and drink plenty of liquids before your test. If you smoke, you may be asked not to smoke within 2 hours before the test. Your provider will also tell you which medicines to take or not take on the day of your test.  What will happen during a nonstress test?  You may be asked to lie down or recline back for the test on a bed. One or 2 belts with sensors will be placed around your abdomen. Your baby's heart rate will be recorded with a machine. If your baby does not move, your baby may be asleep. Your healthcare provider may make a noise near your abdomen to try to wake your baby. The test usually takes about 20 minutes, but can take longer if your baby needs to be awakened.       What do I need to know about the test results?  Your baby will be expected to move at least 2 times for a certain amount of time. Your baby's heart rate will be expected to go up by a certain number of beats per minute during movement. If your baby does not move as expected, the test may need to be repeated or you may need other tests.  CARE AGREEMENT:   You have the right to help plan your care. Learn about your health condition and how it may be treated. Discuss treatment options with your healthcare providers to decide what care you want to receive. You always have the right to refuse treatment. The above information is an  only. It is not intended as medical advice for individual conditions or treatments. Talk to your doctor, nurse or pharmacist before following any medical regimen to see if it is safe and effective for you.  ©  Copyright [employer] 2023 Information is for End User's use only and may not be sold, redistributed or otherwise used for commercial purposes.  Kick Counts in Pregnancy   AMBULATORY CARE:   Kick counts  measure how much your baby is moving in your womb. A kick from your baby can be felt as a twist, turn, swish, roll, or jab. It is common to feel your baby kicking at 26 to 28 weeks of pregnancy. You may feel your baby kick as early as 20 weeks of pregnancy. You may want to start counting at 28 weeks.   Contact your doctor immediately if:   You feel a change in the number of kicks or movements of your baby.     You feel fewer than 10 kicks within 2 hours.     You have questions or concerns about your baby's movements.    Why measure kick counts:  Your baby's movement may provide information about your baby's health. He or she may move less, or not at all, if there are problems. Your baby may move less if he or she is not getting enough oxygen or nutrition from the placenta. Do not smoke while you are pregnant. Smoking decreases the amount of oxygen that gets to your baby. Talk to your healthcare provider if you need help to quit smoking. Tell your healthcare provider as soon as you feel a change in your baby's movements.  When to measure kick counts:   Measure kick counts at the same time every day.      Measure kick counts when your baby is awake and most active. Your baby may be most active in the evening.    How to measure kick counts:  Check that your baby is awake before you measure kick counts. You can wake up your baby by lightly pushing on your belly, walking, or drinking something cold. Your healthcare provider may tell you different ways to measure kick counts. You may be told to do the following:  Use a chart or clock to keep track of the time you start and finish counting.     Sit in a chair or lie on your left side.     Place your hands on the largest part of your belly.     Count until you reach 10 kicks.  Write down how much time it takes to count 10 kicks.     It may take 30 minutes to 2 hours to count 10 kicks. It should not take more than 2 hours to count 10 kicks.    Follow up with your doctor as directed:  Write down your questions so you remember to ask them during your visits.   © 2023 Information is for End User's use only and may not be sold, redistributed or otherwise used for commercial purposes.  The above information is an  only. It is not intended as medical advice for individual conditions or treatments. Talk to your doctor, nurse or pharmacist before following any medical regimen to see if it is safe and effective for you.  Thank you for choosing us for your  care today.  If you have any questions about your ultrasound or care, please do not hesitate to contact us or your primary obstetrician.      Some general instructions for your pregnancy are:    Exercise: Aim for 22 minutes per day (150 minutes per week) of regular exercise.  Walking is great!  Nutrition: aim for calcium-rich and iron-rich foods as well as healthy sources of protein.    Learn about Preeclampsia: preeclampsia is a common, serious high blood pressure complication in pregnancy.  A blood pressure of 140mmHg (systolic or top number) or 90mmHg (diastolic or bottom number) is not normal and needs evaluation by your doctor.  Aspirin is sometimes prescribed in early pregnancy to prevent preeclampsia in women with risk factors - ask your obstetrician if you should be on this medication.  If you smoke, try to reduce how many cigarettes you smoke or try to quit completely.  Do not vape.    Other warning signs to watch out for in pregnancy or postpartum: chest pain, obstructed breathing or shortness of breath, seizures, thoughts of hurting yourself or your baby, bleeding, a painful or swollen leg, fever, or headache (see [facility] POST-BIRTH Warning Signs campaign).  If these happen call 911.  Itching  is also not normal in pregnancy and if you experience this, especially over your hands and feet, potentially worse at night, notify your doctors.

## 2024-06-18 ENCOUNTER — ULTRASOUND (OUTPATIENT)
Dept: PERINATAL CARE | Facility: OTHER | Age: 29
End: 2024-06-18
Payer: COMMERCIAL

## 2024-06-18 VITALS
WEIGHT: 166.8 LBS | HEART RATE: 106 BPM | DIASTOLIC BLOOD PRESSURE: 58 MMHG | HEIGHT: 64 IN | BODY MASS INDEX: 28.48 KG/M2 | SYSTOLIC BLOOD PRESSURE: 104 MMHG

## 2024-06-18 DIAGNOSIS — Z36.89 ENCOUNTER FOR ULTRASOUND TO CHECK FETAL GROWTH: Primary | ICD-10-CM

## 2024-06-18 DIAGNOSIS — Z3A.33 33 WEEKS GESTATION OF PREGNANCY: ICD-10-CM

## 2024-06-18 DIAGNOSIS — O36.5990 PREGNANCY AFFECTED BY FETAL GROWTH RESTRICTION: ICD-10-CM

## 2024-06-18 PROCEDURE — 76820 UMBILICAL ARTERY ECHO: CPT | Performed by: OBSTETRICS & GYNECOLOGY

## 2024-06-18 PROCEDURE — 99214 OFFICE O/P EST MOD 30 MIN: CPT | Performed by: OBSTETRICS & GYNECOLOGY

## 2024-06-18 PROCEDURE — 76816 OB US FOLLOW-UP PER FETUS: CPT | Performed by: OBSTETRICS & GYNECOLOGY

## 2024-06-18 PROCEDURE — 59025 FETAL NON-STRESS TEST: CPT | Performed by: OBSTETRICS & GYNECOLOGY

## 2024-06-18 NOTE — LETTER
"2024    ELIDA Aragon  [address]  [place] [address]    Patient: Zakiya Ozuna   YOB: 1995   Date of Visit: 2024   Gestational age 33w4d   Nature of this communication: Routine though please note new diagnosis of FGR today       Dear Lacy MARRERO,    This patient was seen recently in our  office.  The content of my evaluation today is in the ultrasound report under \"OB Procedures\" tab.     Please don't hesitate to contact our office with any concerns or questions.     Sincerely,      Nakia Leo MD  Attending Physician, Maternal-Fetal Medicine  [facility]      "

## 2024-06-20 ENCOUNTER — TELEPHONE (OUTPATIENT)
Age: 29
End: 2024-06-20

## 2024-06-20 NOTE — TELEPHONE ENCOUNTER
Patient called in stated she received a call but no one responded when she answered. No note as to what call was regarding. Reach back out to patient if needed.

## 2024-06-26 ENCOUNTER — ULTRASOUND (OUTPATIENT)
Dept: PERINATAL CARE | Facility: OTHER | Age: 29
End: 2024-06-26
Payer: COMMERCIAL

## 2024-06-26 VITALS
SYSTOLIC BLOOD PRESSURE: 111 MMHG | BODY MASS INDEX: 29.06 KG/M2 | WEIGHT: 170.2 LBS | HEIGHT: 64 IN | HEART RATE: 99 BPM | DIASTOLIC BLOOD PRESSURE: 62 MMHG

## 2024-06-26 DIAGNOSIS — O36.5990 PREGNANCY AFFECTED BY FETAL GROWTH RESTRICTION: Primary | ICD-10-CM

## 2024-06-26 DIAGNOSIS — Z3A.34 34 WEEKS GESTATION OF PREGNANCY: ICD-10-CM

## 2024-06-26 DIAGNOSIS — Z3A.33 33 WEEKS GESTATION OF PREGNANCY: ICD-10-CM

## 2024-06-26 PROCEDURE — 76815 OB US LIMITED FETUS(S): CPT | Performed by: OBSTETRICS & GYNECOLOGY

## 2024-06-26 PROCEDURE — 76820 UMBILICAL ARTERY ECHO: CPT | Performed by: OBSTETRICS & GYNECOLOGY

## 2024-06-26 PROCEDURE — 59025 FETAL NON-STRESS TEST: CPT | Performed by: OBSTETRICS & GYNECOLOGY

## 2024-06-26 NOTE — PROGRESS NOTES
Repeat Non-Stress Testing:    Patient verbalizes +FM. Pt denies ALL:               Leaking of fluid   Contractions   Vaginal bleeding   Decreased fetal movement    Patient is performing daily kick counts. Patient has no questions or concerns.   NST strip reviewed by Dr. Leo.

## 2024-06-27 PROBLEM — Z3A.35 35 WEEKS GESTATION OF PREGNANCY: Status: ACTIVE | Noted: 2024-01-10

## 2024-06-27 NOTE — PATIENT INSTRUCTIONS
Patient Education     Pregnancy - The Eighth Month   About this topic   It is important for you to learn how to take care of yourself to help you have a healthy baby and safe delivery. It is good to have health care throughout your pregnancy.  The eighth month of your pregnancy starts around week 33 and lasts through week 36. By knowing how far along you are, you can learn what is normal for this stage of your pregnancy and plan for what is next.  General   Your body   During the eighth month of your pregnancy, here are some things you can expect.  You may:  Be feeling more tired. Rest as much as you can and take small naps if possible. This also helps with swollen feet and ankles.  Feel hot all the time. Be sure to drink plenty of water.  Notice your baby drops more into your pelvis. This makes breathing a bit easier, but you may have to go to the bathroom more often. You may also notice more problems with hemorrhoids.  Have more back pain  Notice clear jelly-like substance flecked with blood when you use the restroom. This is your mucus plug.  Feel less steady on your feet. This is because your hips and joints are preparing for birth.  Be tested for Group Beta Strep (GBS) to see if you will need antibiotics during labor  Gain about 1/2 to 1 pound (.23 to .45 kg) a week for the rest of your pregnancy. It is normal to gain about 5 to 10 pounds (2.3 to 4.5 kg) total in your first 4 months.  Have a BPP, or Biophysical Profile. The doctors do an ultrasound to check your baby’s health if you are at high risk or having problems.  Have a NST, or Non Stress Test. The staff place special monitors around your belly to check the baby’s heart rate and look for contractions.  Your baby's growth and development:  Your baby is almost fully mature but will spend the rest of the time inside of you getting bigger.  Their lungs are the last organ to mature, so it is important that your baby stays in your womb until your due date if  possible.  Their bones are getting stronger each day. The bones in their skull stay a little softer to make delivery easier.  They are able to scratch themselves as their fingernails are developed.  Your baby is becoming protected from germs as they develop antibodies.  They are moving a little less because there is less room.  Your baby is about 19 inches (48 cm) long and weighs about 6 pounds (2,700 gm). Your baby is about the size of a papaya or pineapple.  Things to Think About   Avoid alcohol, drugs, tobacco products, and second hand smoke.  Be sure you know the signs of labor and when to call your doctor.  Are you planning a natural childbirth or thinking about an epidural? Know things you can do to help cope with labor pain.  You may want to learn about cord blood banking.  Do you have everything you need for after the baby is born? Be sure the car seat fits in the car.  When do I need to call the doctor?   Contractions every 10 minutes or more often that do not go away with drinking water or position changes  Headache that does not go away; blurry vision; seeing spots or halos; increase in swelling in your hands, feet, or face; and pain under your ribs on the right side  Low, dull back pain that does not go away  Pressure in your pelvis that feels like your baby is pushing down  A gush or constant trickle of watery or bloody fluid leaking from your vagina  Little to no movement felt by baby in 2 hours. Your baby should be moving at least 10 times every 2 hours.  Vaginal bleeding with or without pain  Fever of 100.4°F (38°C) or higher  After a car accident, fall, or any trauma to your belly  Having thoughts of harming yourself or others, or do not feel safe at home  Last Reviewed Date   2020-05-06  Consumer Information Use and Disclaimer   This generalized information is a limited summary of diagnosis, treatment, and/or medication information. It is not meant to be comprehensive and should be used as a tool  to help the user understand and/or assess potential diagnostic and treatment options. It does NOT include all information about conditions, treatments, medications, side effects, or risks that may apply to a specific patient. It is not intended to be medical advice or a substitute for the medical advice, diagnosis, or treatment of a health care provider based on the health care provider's examination and assessment of a patient’s specific and unique circumstances. Patients must speak with a health care provider for complete information about their health, medical questions, and treatment options, including any risks or benefits regarding use of medications. This information does not endorse any treatments or medications as safe, effective, or approved for treating a specific patient. [employer] and its affiliates disclaim any warranty or liability relating to this information or the use thereof. The use of this information is governed by the Terms of Use, available at https://www.[employer].com/en/know/clinical-effectiveness-terms   Copyright   Copyright © 2024 [employer] and its affiliates and/or licensors. All rights reserved.      Perineal Massage    Perineal massage is recommended starting after 34 weeks in order to reduce risks of perineal tearing during childbirth.  You have been provided and instructional sheet in your yellow 28 week prenatal packet.

## 2024-06-27 NOTE — ASSESSMENT & PLAN NOTE
Zakiya Ozuna is a 28 y.o.   35w0d who presents for routine PNV.  28 week labs reviewed: NML  TWG 14.2 kg (31 lb 3.2 oz)   Denies OB complaints. Good fetal movement. Denies contractions, cramping, leakage of fluid or vaginal bleeding.   Tdap vaccine declined  Reviewed  labor precautions and FKCs.   Advised to start Perineal massage at 34-36 weeks   Pregnancy Essential guide and Baby and Me web site recommended

## 2024-06-28 ENCOUNTER — ROUTINE PRENATAL (OUTPATIENT)
Dept: OBGYN CLINIC | Facility: CLINIC | Age: 29
End: 2024-06-28
Payer: COMMERCIAL

## 2024-06-28 VITALS
SYSTOLIC BLOOD PRESSURE: 120 MMHG | OXYGEN SATURATION: 99 % | BODY MASS INDEX: 28.85 KG/M2 | WEIGHT: 169 LBS | HEART RATE: 83 BPM | DIASTOLIC BLOOD PRESSURE: 72 MMHG | HEIGHT: 64 IN

## 2024-06-28 DIAGNOSIS — Z3A.35 35 WEEKS GESTATION OF PREGNANCY: Primary | ICD-10-CM

## 2024-06-28 LAB
SL AMB  POCT GLUCOSE, UA: NORMAL
SL AMB POCT URINE PROTEIN: NORMAL

## 2024-06-28 PROCEDURE — 81002 URINALYSIS NONAUTO W/O SCOPE: CPT | Performed by: OBSTETRICS & GYNECOLOGY

## 2024-06-28 PROCEDURE — PNV: Performed by: OBSTETRICS & GYNECOLOGY

## 2024-06-28 NOTE — PROGRESS NOTES
Problem   35 Weeks Gestation of Pregnancy    Blood Type: O neg   Rhogam: given 5/10  Pap 10/06/2021. GC/CT neg  PN1 Labs:  done     28 Week Labs:nml  AFP: neg    Level 2: 3/22  FG at 32w on   Tdap: declined  Flu: declined  GBS swab:     Blue folder: given   Yellow folder: given    Breast pump order:ordered  L&D forms: working on them    Delivery consent: signed  IOL:            35 weeks gestation of pregnancy  Zakiya Ozuna is a 28 y.o.   35w0d who presents for routine PNV.  28 week labs reviewed: NML  TWG 14.2 kg (31 lb 3.2 oz)   Denies OB complaints. Good fetal movement. Denies contractions, cramping, leakage of fluid or vaginal bleeding.   Tdap vaccine declined  Reviewed  labor precautions and FKCs.   Advised to start Perineal massage at 34-36 weeks   Pregnancy Essential guide and Baby and Me web site recommended

## 2024-06-28 NOTE — PROGRESS NOTES
35w0d  Pap 10/06/2021.Neg   GC/CT:2024 Neg /Neg   PN1 Labs: 2024  Blood Type: O  Negative     Rhogam given on 5/10/2024  MFM Level 1:2024  MFM Level 2:2024  AFP: 2024  28 Week Labs:2024  TDap:  Flu:  GBS:   Blue Folder:given  Yellow Folder:given  Ped Referral :given   Breast pump:Patient reports that she  did received her breast pump.  L&D forms: done  Delivery consent: done     Patient reports positive fetal  movements with no concerns at this time .

## 2024-07-03 PROBLEM — Z3A.36 36 WEEKS GESTATION OF PREGNANCY: Status: ACTIVE | Noted: 2024-01-10

## 2024-07-03 NOTE — PATIENT INSTRUCTIONS
Patient Education     Pregnancy - The Ninth Month   About this topic   It is important for you to learn how to take care of yourself to help you have a healthy baby and safe delivery. It is good to have health care throughout your pregnancy.  The ninth month of your pregnancy starts around week 37 and lasts through delivery. By knowing how far along you are, you can learn what is normal for this stage of your pregnancy and plan for what is next.  General   Your body   During the ninth month of your pregnancy, here are some things you can expect.  You may:  Lose your mucous plug as your cervix starts to get thinner and dilate.  Notice a small amount of streaky red or pink spotting.  Your doctor may discuss stripping your membranes to help the labor progress.  Go into labor any time. Most women deliver their baby between 38 and 42 weeks.  Notice your belly button sticks out. It should go back to normal after you give birth.  Have a bit of extra energy as you get ready for your baby  Notice your breasts are leaking more. This is normal as your body is making the first milk you can feed your baby.  Have tests to check on how your baby is doing. Your doctor will most likely not let you be pregnant for more than 42 weeks.  Not gain any weight this month. Some mothers even lose 1 to 2 pounds (.45 to .9 kg).  Your baby's growth and development:  Your baby has been busy swallowing fluid and building up waste products for their first bowel movement.  Your baby may start sucking their thumb.  They may come out with dry skin, bruises, or a misshapen head. Living in a watery fluid and going through labor is tough on your baby too. These are all normal and will change in the first weeks of life.  Your baby may have lots of hair on their head or not very much at all. Long fingernails are normal.  Your baby is about 20 inches (51 cm) long and weighs about 7 1/2 pounds (3,400 gm). Your baby is about the size of a watermelon.  Things  to Think About   Avoid alcohol, drugs, tobacco products, and second hand smoke.  Talk to your doctor if you plan to travel or get on a plane.  Have your bag packed so you are ready for delivery.  Are you planning a natural childbirth or thinking about an epidural? Know things you can do to help cope with labor pain.  If you are having a boy, decide if you want to have him circumcised.  Be sure the car seat is installed the right way so you are ready to bring your baby home.  When do I need to call the doctor?   Contractions every 5 minutes or more often that do not go away with rest, drinking water, or position changes  Headache that does not go away; blurry vision; seeing spots or halos; increase in swelling in your hands, feet, or face; and pain under your ribs on the right side  Low, dull back pain that does not go away  Pressure in your pelvis that feels like your baby is pushing down  A gush or constant trickle of watery or bloody fluid leaking from your vagina  Little to no movement felt by baby in 2 hours. Your baby should be moving at least 10 times every 2 hours.  Vaginal bleeding with or without pain  Fever of 100.4°F (38°C) or higher  After a car accident, fall, or any trauma to your belly  Having thoughts of harming yourself or others, or do not feel safe at home  Last Reviewed Date   2020-05-06  Consumer Information Use and Disclaimer   This generalized information is a limited summary of diagnosis, treatment, and/or medication information. It is not meant to be comprehensive and should be used as a tool to help the user understand and/or assess potential diagnostic and treatment options. It does NOT include all information about conditions, treatments, medications, side effects, or risks that may apply to a specific patient. It is not intended to be medical advice or a substitute for the medical advice, diagnosis, or treatment of a health care provider based on the health care provider's examination  "and assessment of a patient’s specific and unique circumstances. Patients must speak with a health care provider for complete information about their health, medical questions, and treatment options, including any risks or benefits regarding use of medications. This information does not endorse any treatments or medications as safe, effective, or approved for treating a specific patient. Up[employer] Inc[employer] and its affiliates disclaim any warranty or liability relating to this information or the use thereof. The use of this information is governed by the Terms of Use, available at https://www.[employer].com/en/know/clinical-effectiveness-terms   Copyright   Copyright ©  UpToDate, Inc. and its affiliates and/or licensors. All rights reserved.  Patient Education     How to tell when labor starts   The Basics   Written by the doctors and editors at [email or web address]   What is labor? -- When a person is pregnant, labor is the process that leads to birth of the baby. Contractions of the uterus are the main sign of labor.  Labor usually starts on its own between 37 and 42 weeks of pregnancy. Your \"due date\" is at 40 weeks.  A pregnancy that lasts 37 to 42 weeks is called a \"term\" pregnancy. When labor starts before 37 weeks, doctors call it \"\" labor.  What are the signs that labor is starting? -- The signs that labor is starting, or about to start, can include the following:   The baby moves lower (or \"drops\") in your belly.   You have increased vaginal discharge that is thick, mucus-like, or slightly bloody. (\"Vaginal discharge\" is the term doctors use to describe the fluid that comes out of the vagina.) The increased vaginal discharge is sometimes called a \"mucus plug\" or a \"bloody show.\"   Your \"water breaks.\" During pregnancy, your baby is in a sac in your uterus and surrounded by a fluid called \"amniotic fluid.\" This sac typically breaks open sometime before your baby is born. When it breaks open, the fluid inside " "comes out of your vagina. This can feel like a big gush or just a trickle of fluid.   You have low back pain or belly cramps.   You start having contractions. During a contraction, the uterus tightens. This can be painful and make your belly feel hard. After a contraction, the uterus relaxes and the pain goes away. Some people have \"Frank-Steele contractions\" or \"false-labor contractions.\" These feel like contractions, but they are not true contractions. They do not mean that you are in labor.  How can I tell if I'm having true contractions? -- It can be hard to tell if you are having true contractions or [place]-Steele contractions. But here are some ways to help tell the difference:   True contractions come every few minutes and get more frequent over time. [place]-Steele contractions can come every few minutes, but they don't get more frequent over time.   True contractions don't go away, even when you rest. Frank-Steele contractions usually go away when you rest.   True contractions get stronger and more painful over time. [place]-Stee[place] contractions usually don't get stronger or more painful over time.   True contractions might be felt in your back and front. Frank-Steele contractions are usually only in front.  If you are still not sure whether you are having true contractions, call your doctor or midwife.  What should I do if I start having contractions? -- If you start having contractions, time them to see how far apart they are. That way, you can tell if they get more frequent.  You can time your contractions by keeping track of the time when each contraction starts. If you have a clock with a second hand or a timer on your smartphone, you can also time how long each contraction lasts. Your doctor or midwife will want to know how far apart your contractions are and how long they last.  When should I call my doctor or midwife? -- Call your doctor or midwife if you think that you are in labor. You should also " "call if any of the following things happen:   You have blood, mucus, or fluid leaking from your vagina.   You have 6 or more contractions in 1 hour. (This means that your contractions are 10 minutes apart or less.)   Your contractions are getting stronger and are painful.  Your doctor or midwife will probably want to see you to do an exam. To tell if you are in labor, they will check your cervix to see if it is opening (\"dilated\") and thinning out. They will see how frequent your contractions are. They might also do other tests.  What if my labor starts too soon? -- If you start having any symptoms of labor before 37 weeks, call your doctor right away. They might want to give you medicine to try to stop your labor or help the baby if it is born early.  What if my labor doesn't start on its own? -- If your labor doesn't start on its own, your doctor will talk to you about your options. They might try to start your labor with medicines. This is called \"inducing labor.\"  How long will my labor last? -- If it's your first baby, your labor will probably last for many hours. If it's not your first baby, your labor will probably be shorter.  All topics are updated as new evidence becomes available and our peer review process is complete.  This topic retrieved from [employer] on: Feb 26, 2024.  Topic 45668 Version 12.0  Release: 32.2.4 - C32.56  © 2024 UpToDate, Inc. and/or its affiliates. All rights reserved.  Consumer Information Use and Disclaimer   Disclaimer: This generalized information is a limited summary of diagnosis, treatment, and/or medication information. It is not meant to be comprehensive and should be used as a tool to help the user understand and/or assess potential diagnostic and treatment options. It does NOT include all information about conditions, treatments, medications, side effects, or risks that may apply to a specific patient. It is not intended to be medical advice or a substitute for the medical " advice, diagnosis, or treatment of a health care provider based on the health care provider's examination and assessment of a patient's specific and unique circumstances. Patients must speak with a health care provider for complete information about their health, medical questions, and treatment options, including any risks or benefits regarding use of medications. This information does not endorse any treatments or medications as safe, effective, or approved for treating a specific patient. UpToDate, Inc. and its affiliates disclaim any warranty or liability relating to this information or the use thereof.The use of this information is governed by the Terms of Use, available at https://www.[employer] [facility].com/en/know/clinical-effectiveness-terms. 2024© UpToDate, Inc. and its affiliates and/or licensors. All rights reserved.  Copyright   © 2024 UpToDate, Inc. and/or its affiliates. All rights reserved.      Perineal Massage    Perineal massage is recommended starting after 34 weeks in order to reduce risks of perineal tearing during childbirth.  You have been provided and instructional sheet in your yellow 28 week prenatal packet.

## 2024-07-03 NOTE — ASSESSMENT & PLAN NOTE
Zakiya Ozuna is a 28 y.o.   36w0d who presents for routine PNV.  Beta strep collected today   28 week labs reviewed:   TWG 15 kg (33 lb)   Denies OB complaints. Good fetal movement. Denies contractions, cramping, leakage of fluid or vaginal bleeding.   Tdap vaccine declined  Reviewed  labor precautions and FKCs.   Advised to start Perineal massage at 34-36 weeks   Pregnancy Essential guide and Baby and Me web site recommended

## 2024-07-05 ENCOUNTER — ROUTINE PRENATAL (OUTPATIENT)
Dept: OBGYN CLINIC | Facility: CLINIC | Age: 29
End: 2024-07-05

## 2024-07-05 VITALS
WEIGHT: 172 LBS | SYSTOLIC BLOOD PRESSURE: 120 MMHG | DIASTOLIC BLOOD PRESSURE: 70 MMHG | BODY MASS INDEX: 29.37 KG/M2 | HEIGHT: 64 IN | OXYGEN SATURATION: 99 % | HEART RATE: 96 BPM

## 2024-07-05 DIAGNOSIS — Z3A.36 36 WEEKS GESTATION OF PREGNANCY: Primary | ICD-10-CM

## 2024-07-05 PROBLEM — Z3A.37 37 WEEKS GESTATION OF PREGNANCY: Status: ACTIVE | Noted: 2024-01-10

## 2024-07-05 PROCEDURE — 87150 DNA/RNA AMPLIFIED PROBE: CPT | Performed by: OBSTETRICS & GYNECOLOGY

## 2024-07-05 PROCEDURE — PNV: Performed by: OBSTETRICS & GYNECOLOGY

## 2024-07-05 NOTE — PROGRESS NOTES
Problem   36 Weeks Gestation of Pregnancy    Blood Type: O neg   Rhogam: given 5/10  Pap 10/06/2021. GC/CT neg  PN1 Labs:  done     28 Week Labs:nml  AFP: neg    Level 2: 3/22  FG at 32w on   Tdap: declined  Flu: declined  GBS swab: completed   Rhogam given     Blue folder: given   Yellow folder: given    Breast pump order:ordered  L&D forms: working on them    Delivery consent: signed  IOL:            36 weeks gestation of pregnancy  Zakiya Ozuna is a 28 y.o.   36w0d who presents for routine PNV.  Beta strep collected today   28 week labs reviewed:   TWG 15 kg (33 lb)   Denies OB complaints. Good fetal movement. Denies contractions, cramping, leakage of fluid or vaginal bleeding.   Tdap vaccine declined  Reviewed  labor precautions and FKCs.   Advised to start Perineal massage at 34-36 weeks   Pregnancy Essential guide and Baby and Me web site recommended

## 2024-07-05 NOTE — PROGRESS NOTES
36w0d  Pap 10/06/2021.Negative   GC/CT: 2024 Neg /Neg   PN1 Labs: 2024  Blood Type: O  Negative     Rhogam given on 5/10/2024  MFM Level 1:2024  MFM Level 2:2024  AFP: 2024  28 Week Labs: 2024   TDap:  Flu:  GBS:  collect today   Blue Folder: given   Yellow Folder:given   Ped Referral :given   Breast pump:  L&D forms:done  Delivery consent: done    Patient reports positive fetal movements with no concerns at this time .  Patient reports that she is doing well just a little tired now .

## 2024-07-09 LAB — GP B STREP DNA SPEC QL NAA+PROBE: NEGATIVE

## 2024-07-09 NOTE — PATIENT INSTRUCTIONS
Patient Education     Pregnancy - The Eighth Month   About this topic   It is important for you to learn how to take care of yourself to help you have a healthy baby and safe delivery. It is good to have health care throughout your pregnancy.  The eighth month of your pregnancy starts around week 33 and lasts through week 36. By knowing how far along you are, you can learn what is normal for this stage of your pregnancy and plan for what is next.  General   Your body   During the eighth month of your pregnancy, here are some things you can expect.  You may:  Be feeling more tired. Rest as much as you can and take small naps if possible. This also helps with swollen feet and ankles.  Feel hot all the time. Be sure to drink plenty of water.  Notice your baby drops more into your pelvis. This makes breathing a bit easier, but you may have to go to the bathroom more often. You may also notice more problems with hemorrhoids.  Have more back pain  Notice clear jelly-like substance flecked with blood when you use the restroom. This is your mucus plug.  Feel less steady on your feet. This is because your hips and joints are preparing for birth.  Be tested for Group Beta Strep (GBS) to see if you will need antibiotics during labor  Gain about 1/2 to 1 pound (.23 to .45 kg) a week for the rest of your pregnancy. It is normal to gain about 5 to 10 pounds (2.3 to 4.5 kg) total in your first 4 months.  Have a BPP, or Biophysical Profile. The doctors do an ultrasound to check your baby’s health if you are at high risk or having problems.  Have a NST, or Non Stress Test. The staff place special monitors around your belly to check the baby’s heart rate and look for contractions.  Your baby's growth and development:  Your baby is almost fully mature but will spend the rest of the time inside of you getting bigger.  Their lungs are the last organ to mature, so it is important that your baby stays in your womb until your due date if  possible.  Their bones are getting stronger each day. The bones in their skull stay a little softer to make delivery easier.  They are able to scratch themselves as their fingernails are developed.  Your baby is becoming protected from germs as they develop antibodies.  They are moving a little less because there is less room.  Your baby is about 19 inches (48 cm) long and weighs about 6 pounds (2,700 gm). Your baby is about the size of a papaya or pineapple.  Things to Think About   Avoid alcohol, drugs, tobacco products, and second hand smoke.  Be sure you know the signs of labor and when to call your doctor.  Are you planning a natural childbirth or thinking about an epidural? Know things you can do to help cope with labor pain.  You may want to learn about cord blood banking.  Do you have everything you need for after the baby is born? Be sure the car seat fits in the car.  When do I need to call the doctor?   Contractions every 10 minutes or more often that do not go away with drinking water or position changes  Headache that does not go away; blurry vision; seeing spots or halos; increase in swelling in your hands, feet, or face; and pain under your ribs on the right side  Low, dull back pain that does not go away  Pressure in your pelvis that feels like your baby is pushing down  A gush or constant trickle of watery or bloody fluid leaking from your vagina  Little to no movement felt by baby in 2 hours. Your baby should be moving at least 10 times every 2 hours.  Vaginal bleeding with or without pain  Fever of 100.4°F (38°C) or higher  After a car accident, fall, or any trauma to your belly  Having thoughts of harming yourself or others, or do not feel safe at home  Last Reviewed Date   2020-05-06  Consumer Information Use and Disclaimer   This generalized information is a limited summary of diagnosis, treatment, and/or medication information. It is not meant to be comprehensive and should be used as a tool  to help the user understand and/or assess potential diagnostic and treatment options. It does NOT include all information about conditions, treatments, medications, side effects, or risks that may apply to a specific patient. It is not intended to be medical advice or a substitute for the medical advice, diagnosis, or treatment of a health care provider based on the health care provider's examination and assessment of a patient’s specific and unique circumstances. Patients must speak with a health care provider for complete information about their health, medical questions, and treatment options, including any risks or benefits regarding use of medications. This information does not endorse any treatments or medications as safe, effective, or approved for treating a specific patient. UpToDate, Inc. and its affiliates disclaim any warranty or liability relating to this information or the use thereof. The use of this information is governed by the Terms of Use, available at https://www.[employer].com/en/know/clinical-effectiveness-terms   Copyright   Copyright © 2024 UpToDate, Inc. and its affiliates and/or licensors. All rights reserved.

## 2024-07-10 ENCOUNTER — TELEPHONE (OUTPATIENT)
Age: 29
End: 2024-07-10

## 2024-07-10 ENCOUNTER — ROUTINE PRENATAL (OUTPATIENT)
Dept: OBGYN CLINIC | Facility: CLINIC | Age: 29
End: 2024-07-10
Payer: COMMERCIAL

## 2024-07-10 VITALS — WEIGHT: 173 LBS | DIASTOLIC BLOOD PRESSURE: 70 MMHG | BODY MASS INDEX: 29.7 KG/M2 | SYSTOLIC BLOOD PRESSURE: 110 MMHG

## 2024-07-10 DIAGNOSIS — Z67.91 RH NEGATIVE STATE IN ANTEPARTUM PERIOD: ICD-10-CM

## 2024-07-10 DIAGNOSIS — O16.9 HIGH BLOOD PRESSURE AFFECTING PREGNANCY, ANTEPARTUM: ICD-10-CM

## 2024-07-10 DIAGNOSIS — Z3A.36 36 WEEKS GESTATION OF PREGNANCY: ICD-10-CM

## 2024-07-10 DIAGNOSIS — O36.5990 PREGNANCY AFFECTED BY FETAL GROWTH RESTRICTION: ICD-10-CM

## 2024-07-10 DIAGNOSIS — Z34.03 ENCOUNTER FOR SUPERVISION OF NORMAL FIRST PREGNANCY, THIRD TRIMESTER: Primary | ICD-10-CM

## 2024-07-10 DIAGNOSIS — O26.899 RH NEGATIVE STATE IN ANTEPARTUM PERIOD: ICD-10-CM

## 2024-07-10 PROCEDURE — 81002 URINALYSIS NONAUTO W/O SCOPE: CPT | Performed by: NURSE PRACTITIONER

## 2024-07-10 PROCEDURE — PNV: Performed by: NURSE PRACTITIONER

## 2024-07-10 NOTE — ASSESSMENT & PLAN NOTE
She feels well. BP high, rushing to get here and had a salty lunch (sancocho) Repeat BP was wnl, labs ordered.  She denies headache, double vision, abdominal pain etc. We discussed seeing [facility] ASAP since she canceled her appointments with them for July 10 and July 18.  We discussed the need for fetal growth ultrasound to check on early delivery if there is still IUGR present.  Patient agrees to call and/or stop by before she leaves our building to schedule an appointment. She denies LOF/CTX/VB. She did not voice any concerns. Discussed fetal kick counting.  She was encouraged to continue with her perineal/vaginal massages to prevent lacerations during the labor process.  Overview charting updated today.

## 2024-07-10 NOTE — TELEPHONE ENCOUNTER
Patient called to reschedule the growth ultrasound she had canceled but I have no openings at any location.  Please advise.

## 2024-07-12 ENCOUNTER — ULTRASOUND (OUTPATIENT)
Dept: PERINATAL CARE | Facility: OTHER | Age: 29
End: 2024-07-12
Payer: COMMERCIAL

## 2024-07-12 VITALS
DIASTOLIC BLOOD PRESSURE: 58 MMHG | HEART RATE: 110 BPM | SYSTOLIC BLOOD PRESSURE: 128 MMHG | HEIGHT: 64 IN | WEIGHT: 173 LBS | BODY MASS INDEX: 29.53 KG/M2

## 2024-07-12 DIAGNOSIS — Z36.89 ENCOUNTER FOR ULTRASOUND TO CHECK FETAL GROWTH: ICD-10-CM

## 2024-07-12 DIAGNOSIS — Z3A.37 37 WEEKS GESTATION OF PREGNANCY: ICD-10-CM

## 2024-07-12 DIAGNOSIS — O36.5990 PREGNANCY AFFECTED BY FETAL GROWTH RESTRICTION: Primary | ICD-10-CM

## 2024-07-12 PROCEDURE — 76820 UMBILICAL ARTERY ECHO: CPT | Performed by: STUDENT IN AN ORGANIZED HEALTH CARE EDUCATION/TRAINING PROGRAM

## 2024-07-12 PROCEDURE — 59025 FETAL NON-STRESS TEST: CPT | Performed by: STUDENT IN AN ORGANIZED HEALTH CARE EDUCATION/TRAINING PROGRAM

## 2024-07-12 PROCEDURE — 76816 OB US FOLLOW-UP PER FETUS: CPT | Performed by: STUDENT IN AN ORGANIZED HEALTH CARE EDUCATION/TRAINING PROGRAM

## 2024-07-12 PROCEDURE — 99213 OFFICE O/P EST LOW 20 MIN: CPT | Performed by: STUDENT IN AN ORGANIZED HEALTH CARE EDUCATION/TRAINING PROGRAM

## 2024-07-12 NOTE — PROGRESS NOTES
"[facility]: Ms. Ozuna was seen today for umbilical artery Doppler study, MELCHOR, NST, and growth ultrasound.  See ultrasound report under \"OB Procedures\" tab.      MDM:   I. Diagnoses/Problems addressed:  FGR  II.  Data:  glucola, BP log  III.  Risk of morbidity: Low    Please don't hesitate to contact our office with any concerns or questions.  -Benita Russ MD      "

## 2024-07-12 NOTE — PROGRESS NOTES
Repeat Non-Stress Testing:    Patient verbalizes +FM. Pt denies ALL:               Leaking of fluid   Contractions   Vaginal bleeding   Decreased fetal movement    Patient is performing daily kick counts. Patient has no questions or concerns.   NST strip reviewed by Dr. Dr. Russ.     Attending Attestation (For Attendings USE Only)...

## 2024-07-13 ENCOUNTER — APPOINTMENT (OUTPATIENT)
Dept: LAB | Facility: HOSPITAL | Age: 29
End: 2024-07-13
Payer: COMMERCIAL

## 2024-07-13 DIAGNOSIS — O16.9 HIGH BLOOD PRESSURE AFFECTING PREGNANCY, ANTEPARTUM: ICD-10-CM

## 2024-07-13 LAB
ALBUMIN SERPL BCG-MCNC: 3.4 G/DL (ref 3.5–5)
ALP SERPL-CCNC: 103 U/L (ref 34–104)
ALT SERPL W P-5'-P-CCNC: 10 U/L (ref 7–52)
ANION GAP SERPL CALCULATED.3IONS-SCNC: 9 MMOL/L (ref 4–13)
AST SERPL W P-5'-P-CCNC: 14 U/L (ref 13–39)
BILIRUB SERPL-MCNC: 0.45 MG/DL (ref 0.2–1)
BUN SERPL-MCNC: 11 MG/DL (ref 5–25)
CALCIUM ALBUM COR SERPL-MCNC: 9.6 MG/DL (ref 8.3–10.1)
CALCIUM SERPL-MCNC: 9.1 MG/DL (ref 8.4–10.2)
CHLORIDE SERPL-SCNC: 103 MMOL/L (ref 96–108)
CO2 SERPL-SCNC: 25 MMOL/L (ref 21–32)
CREAT SERPL-MCNC: 0.69 MG/DL (ref 0.6–1.3)
CREAT UR-MCNC: 90.4 MG/DL
ERYTHROCYTE [DISTWIDTH] IN BLOOD BY AUTOMATED COUNT: 13.5 % (ref 11.6–15.1)
GFR SERPL CREATININE-BSD FRML MDRD: 118 ML/MIN/1.73SQ M
GLUCOSE SERPL-MCNC: 92 MG/DL (ref 65–140)
HCT VFR BLD AUTO: 37.7 % (ref 34.8–46.1)
HGB BLD-MCNC: 12.1 G/DL (ref 11.5–15.4)
MCH RBC QN AUTO: 28.4 PG (ref 26.8–34.3)
MCHC RBC AUTO-ENTMCNC: 32.1 G/DL (ref 31.4–37.4)
MCV RBC AUTO: 89 FL (ref 82–98)
PLATELET # BLD AUTO: 309 THOUSANDS/UL (ref 149–390)
PMV BLD AUTO: 10.4 FL (ref 8.9–12.7)
POTASSIUM SERPL-SCNC: 4.1 MMOL/L (ref 3.5–5.3)
PROT SERPL-MCNC: 6.7 G/DL (ref 6.4–8.4)
PROT UR-MCNC: 14 MG/DL
PROT/CREAT UR: 0.15 MG/G{CREAT} (ref 0–0.1)
RBC # BLD AUTO: 4.26 MILLION/UL (ref 3.81–5.12)
SODIUM SERPL-SCNC: 137 MMOL/L (ref 135–147)
URATE SERPL-MCNC: 5.8 MG/DL (ref 2–7.5)
WBC # BLD AUTO: 12.74 THOUSAND/UL (ref 4.31–10.16)

## 2024-07-13 PROCEDURE — 82570 ASSAY OF URINE CREATININE: CPT

## 2024-07-13 PROCEDURE — 84550 ASSAY OF BLOOD/URIC ACID: CPT

## 2024-07-13 PROCEDURE — 36415 COLL VENOUS BLD VENIPUNCTURE: CPT

## 2024-07-13 PROCEDURE — 80053 COMPREHEN METABOLIC PANEL: CPT

## 2024-07-13 PROCEDURE — 85027 COMPLETE CBC AUTOMATED: CPT

## 2024-07-13 PROCEDURE — 84156 ASSAY OF PROTEIN URINE: CPT

## 2024-07-15 ENCOUNTER — TELEPHONE (OUTPATIENT)
Dept: OBGYN CLINIC | Facility: CLINIC | Age: 29
End: 2024-07-15

## 2024-07-15 ENCOUNTER — CLINICAL SUPPORT (OUTPATIENT)
Age: 29
End: 2024-07-15

## 2024-07-15 ENCOUNTER — ROUTINE PRENATAL (OUTPATIENT)
Dept: OBGYN CLINIC | Facility: CLINIC | Age: 29
End: 2024-07-15
Payer: COMMERCIAL

## 2024-07-15 VITALS
SYSTOLIC BLOOD PRESSURE: 122 MMHG | DIASTOLIC BLOOD PRESSURE: 82 MMHG | BODY MASS INDEX: 30.04 KG/M2 | WEIGHT: 175 LBS | HEART RATE: 91 BPM

## 2024-07-15 DIAGNOSIS — Z32.2 ENCOUNTER FOR CHILDBIRTH INSTRUCTION: Primary | ICD-10-CM

## 2024-07-15 DIAGNOSIS — O26.899 RH NEGATIVE STATE IN ANTEPARTUM PERIOD: ICD-10-CM

## 2024-07-15 DIAGNOSIS — Z3A.36 36 WEEKS GESTATION OF PREGNANCY: Primary | ICD-10-CM

## 2024-07-15 DIAGNOSIS — Z67.91 RH NEGATIVE STATE IN ANTEPARTUM PERIOD: ICD-10-CM

## 2024-07-15 LAB
SL AMB  POCT GLUCOSE, UA: NORMAL
SL AMB  POCT GLUCOSE, UA: NORMAL
SL AMB POCT URINE PROTEIN: NORMAL
SL AMB POCT URINE PROTEIN: NORMAL

## 2024-07-15 PROCEDURE — PNV: Performed by: NURSE PRACTITIONER

## 2024-07-15 PROCEDURE — 81002 URINALYSIS NONAUTO W/O SCOPE: CPT | Performed by: NURSE PRACTITIONER

## 2024-07-15 NOTE — PATIENT INSTRUCTIONS
Patient Education     Pregnancy - The Ninth Month   About this topic   It is important for you to learn how to take care of yourself to help you have a healthy baby and safe delivery. It is good to have health care throughout your pregnancy.  The ninth month of your pregnancy starts around week 37 and lasts through delivery. By knowing how far along you are, you can learn what is normal for this stage of your pregnancy and plan for what is next.  General   Your body   During the ninth month of your pregnancy, here are some things you can expect.  You may:  Lose your mucous plug as your cervix starts to get thinner and dilate.  Notice a small amount of streaky red or pink spotting.  Your doctor may discuss stripping your membranes to help the labor progress.  Go into labor any time. Most women deliver their baby between 38 and 42 weeks.  Notice your belly button sticks out. It should go back to normal after you give birth.  Have a bit of extra energy as you get ready for your baby  Notice your breasts are leaking more. This is normal as your body is making the first milk you can feed your baby.  Have tests to check on how your baby is doing. Your doctor will most likely not let you be pregnant for more than 42 weeks.  Not gain any weight this month. Some mothers even lose 1 to 2 pounds (.45 to .9 kg).  Your baby's growth and development:  Your baby has been busy swallowing fluid and building up waste products for their first bowel movement.  Your baby may start sucking their thumb.  They may come out with dry skin, bruises, or a misshapen head. Living in a watery fluid and going through labor is tough on your baby too. These are all normal and will change in the first weeks of life.  Your baby may have lots of hair on their head or not very much at all. Long fingernails are normal.  Your baby is about 20 inches (51 cm) long and weighs about 7 1/2 pounds (3,400 gm). Your baby is about the size of a watermelon.  Things  to Think About   Avoid alcohol, drugs, tobacco products, and second hand smoke.  Talk to your doctor if you plan to travel or get on a plane.  Have your bag packed so you are ready for delivery.  Are you planning a natural childbirth or thinking about an epidural? Know things you can do to help cope with labor pain.  If you are having a boy, decide if you want to have him circumcised.  Be sure the car seat is installed the right way so you are ready to bring your baby home.  When do I need to call the doctor?   Contractions every 5 minutes or more often that do not go away with rest, drinking water, or position changes  Headache that does not go away; blurry vision; seeing spots or halos; increase in swelling in your hands, feet, or face; and pain under your ribs on the right side  Low, dull back pain that does not go away  Pressure in your pelvis that feels like your baby is pushing down  A gush or constant trickle of watery or bloody fluid leaking from your vagina  Little to no movement felt by baby in 2 hours. Your baby should be moving at least 10 times every 2 hours.  Vaginal bleeding with or without pain  Fever of 100.4°F (38°C) or higher  After a car accident, fall, or any trauma to your belly  Having thoughts of harming yourself or others, or do not feel safe at home  Last Reviewed Date   2020-05-06  Consumer Information Use and Disclaimer   This generalized information is a limited summary of diagnosis, treatment, and/or medication information. It is not meant to be comprehensive and should be used as a tool to help the user understand and/or assess potential diagnostic and treatment options. It does NOT include all information about conditions, treatments, medications, side effects, or risks that may apply to a specific patient. It is not intended to be medical advice or a substitute for the medical advice, diagnosis, or treatment of a health care provider based on the health care provider's examination  and assessment of a patient’s specific and unique circumstances. Patients must speak with a health care provider for complete information about their health, medical questions, and treatment options, including any risks or benefits regarding use of medications. This information does not endorse any treatments or medications as safe, effective, or approved for treating a specific patient. [employer] and its affiliates disclaim any warranty or liability relating to this information or the use thereof. The use of this information is governed by the Terms of Use, available at https://www.woltersThinktwiceuwer.com/en/know/clinical-effectiveness-terms   Copyright   Copyright © 2024 [employer] and its affiliates and/or licensors. All rights reserved.

## 2024-07-15 NOTE — PROGRESS NOTES
Pt is feeling fetal movement she is not sure if she just has extra discharge or leaking fluid.  No vaginal bleeding

## 2024-07-15 NOTE — TELEPHONE ENCOUNTER
7/24 8pm into 7/25 EC/ED- CANCELLED    Patient states she does not want to deliver before 39 weeks. ([facility] recommendations is [id number]) patient requests 7/29 into 7/30      Patient rescheduled 7/29 8pm into 7/30      Patient notified of IOL date,time,and location. Advised patient she may eat a light breakfast/dinner prior to going to L&D. In the interim please report any vaginal bleeding,leakage of fluid,decreased fetal movement or contractions.Reviewed fetal kick counts. Advised to keep all upcoming prenatal visits.

## 2024-07-15 NOTE — TELEPHONE ENCOUNTER
----- Message from ELIDA Garrido sent at 7/15/2024 10:01 AM EDT -----  Regarding: mIOL  Procedure to be scheduled (IOL or CS): IOL  KOREY: Estimated Date of Delivery: 8/2/24  Indication for delivery: IUFGR  Requested date (s) of delivery: 39 weeks  Physician preference: none  If IOL, anticipated method: with ripening needed

## 2024-07-15 NOTE — PROGRESS NOTES
Problem   36 Weeks Gestation of Pregnancy    Blood Type: O neg   Rhogam: given 5/10  Pap 10/06/2021. GC/CT neg  PN1 Labs:  done     28 Week Labs:nml  AFP: neg    Level 2: 3/22  FG at 32w on 6/18, Dopplers and growth for IUGR. Pt agrees to mIOL at 39 weeks. Consent signed. Message sent to the Pool  Next NST/Ultrasound on 7/17   Tdap: declined  Flu: declined  GBS swab: completed   Rhogam given 5/10    Blue folder: given   Yellow folder: given    Breast pump order:ordered  L&D forms: submitted  GBS negative    Delivery consent: signed  IOL: to be scheduled           36 weeks gestation of pregnancy  She feels well but feels she has more discharge. Vasalva neg. Cervix closed. Mild yeast noted, pt asymptomatic. Ph 4.5. She denies LOF/CTX/VB. She did not voice any concerns. Discussed fetal kick counting.  She was encouraged to continue with her perineal/vaginal massages to prevent lacerations during the labor process.  Advised to increase fluids on these warmer days.      Overview charting updated today.

## 2024-07-15 NOTE — ASSESSMENT & PLAN NOTE
She feels well but feels she has more discharge. Vasalva neg. Cervix closed. Mild yeast noted, pt asymptomatic. Ph 4.5. She denies LOF/CTX/VB. She did not voice any concerns. Discussed fetal kick counting.  She was encouraged to continue with her perineal/vaginal massages to prevent lacerations during the labor process.  Advised to increase fluids on these warmer days.      Overview charting updated today.

## 2024-07-16 NOTE — PATIENT INSTRUCTIONS
Thank you for choosing us for your  care today.  If you have any questions about your ultrasound or care, please do not hesitate to contact us or your primary obstetrician.        Some general instructions for your pregnancy are:    Exercise: Aim for 150 minutes per week of regular exercise.  Walking is great!  Nutrition: Choose healthy sources of calcium, iron, and protein.  Avoid ultraprocessed foods and added sugar.  Learn about Preeclampsia: preeclampsia is a common, potentially serious high blood pressure complication in pregnancy.  A blood pressure of 140mmHg (systolic or top number) or 90mmHg (diastolic or bottom number) should be evaluated by your doctor.  Aspirin is sometimes prescribed in early pregnancy to prevent preeclampsia in women with risk factors - ask your obstetrician if you should be on this medication.  For more resources, visit:  https://www.highriskpregnancyinfo.org/preeclampsia  If you smoke, please try to quit completely but also try to reduce your smoking by as much as possible (as soon as possible).  Do not vape.  Please also avoid cannabis products.  Other warning signs to watch out for in pregnancy or postpartum: chest pain, obstructed breathing or shortness of breath, seizures, thoughts of hurting yourself or your baby, bleeding, a painful or swollen leg, fever, or headache (see [employer] POST-BIRTH Warning Signs campaign).  If these happen call 911.  Itching is also not normal in pregnancy and if you experience this, especially over your hands and feet, potentially worse at night, notify your doctors.     Nonstress Test for Pregnancy   WHAT YOU NEED TO KNOW:   What do I need to know about a nonstress test?  A nonstress test measures your baby's heart rate and movements. Nonstress means that no stress will be placed on your baby during the test.  How do I prepare for a nonstress test?  Your healthcare provider will talk to you about how to prepare for this test. Your provider may  tell you to eat and drink plenty of liquids before your test. If you smoke, you may be asked not to smoke within 2 hours before the test. Your provider will also tell you which medicines to take or not take on the day of your test.  What will happen during a nonstress test?  You may be asked to lie down or recline back for the test on a bed. One or 2 belts with sensors will be placed around your abdomen. Your baby's heart rate will be recorded with a machine. If your baby does not move, your baby may be asleep. Your healthcare provider may make a noise near your abdomen to try to wake your baby. The test usually takes about 20 minutes, but can take longer if your baby needs to be awakened.        What do I need to know about the test results?  Your baby will be expected to move at least 2 times for a certain amount of time. Your baby's heart rate will be expected to go up by a certain number of beats per minute during movement. If your baby does not move as expected, the test may need to be repeated or you may need other tests.  CARE AGREEMENT:   You have the right to help plan your care. Learn about your health condition and how it may be treated. Discuss treatment options with your healthcare providers to decide what care you want to receive. You always have the right to refuse treatment. The above information is an  only. It is not intended as medical advice for individual conditions or treatments. Talk to your doctor, nurse or pharmacist before following any medical regimen to see if it is safe and effective for you.  © Copyright [employer] 2023 Information is for End User's use only and may not be sold, redistributed or otherwise used for commercial purposes. Kick Counts in Pregnancy   AMBULATORY CARE:   Kick counts  measure how much your baby is moving in your womb. A kick from your baby can be felt as a twist, turn, swish, roll, or jab. It is common to feel your baby kicking at 26 to 28 weeks of  pregnancy. You may feel your baby kick as early as 20 weeks of pregnancy. You may want to start counting at 28 weeks.   Contact your doctor immediately if:   You feel a change in the number of kicks or movements of your baby.      You feel fewer than 10 kicks within 2 hours.      You have questions or concerns about your baby's movements.     Why measure kick counts:  Your baby's movement may provide information about your baby's health. He or she may move less, or not at all, if there are problems. Your baby may move less if he or she is not getting enough oxygen or nutrition from the placenta. Do not smoke while you are pregnant. Smoking decreases the amount of oxygen that gets to your baby. Talk to your healthcare provider if you need help to quit smoking. Tell your healthcare provider as soon as you feel a change in your baby's movements.  When to measure kick counts:   Measure kick counts at the same time every day.       Measure kick counts when your baby is awake and most active. Your baby may be most active in the evening.     How to measure kick counts:  Check that your baby is awake before you measure kick counts. You can wake up your baby by lightly pushing on your belly, walking, or drinking something cold. Your healthcare provider may tell you different ways to measure kick counts. You may be told to do the following:  Use a chart or clock to keep track of the time you start and finish counting.      Sit in a chair or lie on your left side.      Place your hands on the largest part of your belly.      Count until you reach 10 kicks. Write down how much time it takes to count 10 kicks.      It may take 30 minutes to 2 hours to count 10 kicks. It should not take more than 2 hours to count 10 kicks.     Follow up with your doctor as directed:  Write down your questions so you remember to ask them during your visits.   © Copyright [employer] 2023 Information is for End User's use only and may not be sold,  redistributed or otherwise used for commercial purposes.  The above information is an  only. It is not intended as medical advice for individual conditions or treatments. Talk to your doctor, nurse or pharmacist before following any medical regimen to see if it is safe and effective for you.

## 2024-07-18 ENCOUNTER — ULTRASOUND (OUTPATIENT)
Dept: PERINATAL CARE | Facility: OTHER | Age: 29
End: 2024-07-18
Payer: COMMERCIAL

## 2024-07-18 VITALS
HEART RATE: 98 BPM | SYSTOLIC BLOOD PRESSURE: 104 MMHG | BODY MASS INDEX: 29.88 KG/M2 | WEIGHT: 175 LBS | DIASTOLIC BLOOD PRESSURE: 60 MMHG | HEIGHT: 64 IN

## 2024-07-18 DIAGNOSIS — O36.5990 PREGNANCY AFFECTED BY FETAL GROWTH RESTRICTION: ICD-10-CM

## 2024-07-18 DIAGNOSIS — O36.5930 POOR FETAL GROWTH AFFECTING MANAGEMENT OF MOTHER IN THIRD TRIMESTER, SINGLE OR UNSPECIFIED FETUS: Primary | ICD-10-CM

## 2024-07-18 DIAGNOSIS — Z3A.37 37 WEEKS GESTATION OF PREGNANCY: ICD-10-CM

## 2024-07-18 PROCEDURE — 76815 OB US LIMITED FETUS(S): CPT | Performed by: OBSTETRICS & GYNECOLOGY

## 2024-07-18 PROCEDURE — 76820 UMBILICAL ARTERY ECHO: CPT | Performed by: OBSTETRICS & GYNECOLOGY

## 2024-07-18 PROCEDURE — 59025 FETAL NON-STRESS TEST: CPT | Performed by: OBSTETRICS & GYNECOLOGY

## 2024-07-18 NOTE — LETTER
July 18, 2024     ELIDA Aragon  [address] [place] [address]    Patient: Zakiya Ozuna   YOB: 1995   Date of Visit: 7/18/2024       Dear Dr. Brewer:    Thank you for referring Zakiya Ozuna to me for evaluation. Below are my notes for this consultation.    If you have questions, please do not hesitate to call me. I look forward to following your patient along with you.         Sincerely,        Ghazal Givens MD        CC: No Recipients    Ghazal Givens MD  7/18/2024  5:40 PM  Sign when Signing Visit  NST is reactive.  MELCHOR is 7.7 and umbilical Dopplers were normal.  Recommend weekly nonstress tests, amniotic fluid index and umbilical Dopplers weekly till delivery.  Delivery recommended between 38 weeks and 0 days to 39 weeks and 0 days.     Ghazal Givens M.D.

## 2024-07-18 NOTE — PROGRESS NOTES
NST is reactive.  MELCHOR is 7.7 and umbilical Dopplers were normal.  Recommend weekly nonstress tests, amniotic fluid index and umbilical Dopplers weekly till delivery.  Delivery recommended between 38 weeks and 0 days to 39 weeks and 0 days.     Ghazal Givens M.D.

## 2024-07-18 NOTE — LETTER
July 18, 2024     ELIDA Del Angel  [address] [facility]  [address] [place] [address]    Patient: Zakiya Ozuna   YOB: 1995   Date of Visit: 7/18/2024       Dear Dr. Barnes:    Thank you for referring Zakiya Ozuna to me for evaluation. Below are my notes for this consultation.    If you have questions, please do not hesitate to call me. I look forward to following your patient along with you.         Sincerely,        Ghazal Givens MD        CC: No Recipients    Ghazal Givens MD  7/18/2024  5:14 PM  Sign when Signing Visit  NST is reactive.  MELCHOR is 7.7 and umbilical Dopplers were normal. Ghazal Givens M.D.

## 2024-07-18 NOTE — Clinical Note
Patient's induction for delivery for IUGR is set for 7/29/2024 which is over a week from today.  Please schedule patient for a NST MELCHOR and Doppler for 1 week from today.  Ghazal

## 2024-07-22 PROBLEM — Z3A.38 38 WEEKS GESTATION OF PREGNANCY: Status: ACTIVE | Noted: 2024-01-10

## 2024-07-22 NOTE — ASSESSMENT & PLAN NOTE
Zakiya Ozuna is a 28 y.o.   38w4d who presents for routine PNV.  Induction scheduled 24  28 week labs reviewed:   TWG 16.3 kg (36 lb)   Denies OB complaints. Good fetal movement. Denies contractions, cramping, leakage of fluid or vaginal bleeding.   Tdap vaccine declined  Reviewed  labor precautions and FKCs.   Advised to continue  Perineal massage Pregnancy Essential guide and Baby and Me web site recommended

## 2024-07-22 NOTE — PATIENT INSTRUCTIONS
Patient Education     Pregnancy - The Ninth Month   About this topic   It is important for you to learn how to take care of yourself to help you have a healthy baby and safe delivery. It is good to have health care throughout your pregnancy.  The ninth month of your pregnancy starts around week 37 and lasts through delivery. By knowing how far along you are, you can learn what is normal for this stage of your pregnancy and plan for what is next.  General   Your body   During the ninth month of your pregnancy, here are some things you can expect.  You may:  Lose your mucous plug as your cervix starts to get thinner and dilate.  Notice a small amount of streaky red or pink spotting.  Your doctor may discuss stripping your membranes to help the labor progress.  Go into labor any time. Most women deliver their baby between 38 and 42 weeks.  Notice your belly button sticks out. It should go back to normal after you give birth.  Have a bit of extra energy as you get ready for your baby  Notice your breasts are leaking more. This is normal as your body is making the first milk you can feed your baby.  Have tests to check on how your baby is doing. Your doctor will most likely not let you be pregnant for more than 42 weeks.  Not gain any weight this month. Some mothers even lose 1 to 2 pounds (.45 to .9 kg).  Your baby's growth and development:  Your baby has been busy swallowing fluid and building up waste products for their first bowel movement.  Your baby may start sucking their thumb.  They may come out with dry skin, bruises, or a misshapen head. Living in a watery fluid and going through labor is tough on your baby too. These are all normal and will change in the first weeks of life.  Your baby may have lots of hair on their head or not very much at all. Long fingernails are normal.  Your baby is about 20 inches (51 cm) long and weighs about 7 1/2 pounds (3,400 gm). Your baby is about the size of a watermelon.  Things  to Think About   Avoid alcohol, drugs, tobacco products, and second hand smoke.  Talk to your doctor if you plan to travel or get on a plane.  Have your bag packed so you are ready for delivery.  Are you planning a natural childbirth or thinking about an epidural? Know things you can do to help cope with labor pain.  If you are having a boy, decide if you want to have him circumcised.  Be sure the car seat is installed the right way so you are ready to bring your baby home.  When do I need to call the doctor?   Contractions every 5 minutes or more often that do not go away with rest, drinking water, or position changes  Headache that does not go away; blurry vision; seeing spots or halos; increase in swelling in your hands, feet, or face; and pain under your ribs on the right side  Low, dull back pain that does not go away  Pressure in your pelvis that feels like your baby is pushing down  A gush or constant trickle of watery or bloody fluid leaking from your vagina  Little to no movement felt by baby in 2 hours. Your baby should be moving at least 10 times every 2 hours.  Vaginal bleeding with or without pain  Fever of 100.4°F (38°C) or higher  After a car accident, fall, or any trauma to your belly  Having thoughts of harming yourself or others, or do not feel safe at home  Last Reviewed Date   2020-05-06  Consumer Information Use and Disclaimer   This generalized information is a limited summary of diagnosis, treatment, and/or medication information. It is not meant to be comprehensive and should be used as a tool to help the user understand and/or assess potential diagnostic and treatment options. It does NOT include all information about conditions, treatments, medications, side effects, or risks that may apply to a specific patient. It is not intended to be medical advice or a substitute for the medical advice, diagnosis, or treatment of a health care provider based on the health care provider's examination  "and assessment of a patient’s specific and unique circumstances. Patients must speak with a health care provider for complete information about their health, medical questions, and treatment options, including any risks or benefits regarding use of medications. This information does not endorse any treatments or medications as safe, effective, or approved for treating a specific patient. UpToDate, Inc. and its affiliates disclaim any warranty or liability relating to this information or the use thereof. The use of this information is governed by the Terms of Use, available at https://www.[employer].com/en/know/clinical-effectiveness-terms   Copyright   Copyright © 2024 UpToDate, Inc. and its affiliates and/or licensors. All rights reserved.  Patient Education     Your baby's movement before birth   The Basics   Written by the doctors and editors at [employer]   When should I start feeling my baby move? -- It depends. Most people first feel their baby moving in the uterus between about 16 and 20 weeks of pregnancy. It might take longer to feel movement if this is your first pregnancy or if the placenta is in the front of your uterus.  What kinds of movements should I feel? -- When you first feel your baby move, it might feel like a gentle flutter in your belly. This is sometimes called \"quickening.\" As the baby grows, their movements will get stronger. You will probably feel them kicking, rolling, and stretching. Later in pregnancy, you might be able to see and feel the baby moving from the outside.  You might notice that your baby is more active at certain times of the day or night. Even before birth, babies have periods of being asleep and awake. When your baby is sleeping, you might notice that they do not move as much.  Should I keep track of my baby's movements? -- If your pregnancy is healthy, you probably do not need to count or record your baby's movements. Feeling regular movement is a good sign that the " "baby is doing well.  In some cases, your doctor or midwife might ask you to keep track of your baby's movements. If so, they will tell you how to do this and when to call them.  A change in your baby's movements does not always mean that there is a problem. But in some cases, it can be a sign that the baby is having trouble. If your doctor or midwife is concerned, they can do tests to check on the baby.  If I am asked to track movement, how should I do it? -- There are different ways of tracking your baby's movement. This is sometimes called \"kick counting.\"  Your doctor or midwife will tell you exactly what to track. For example, they might ask you to write down:   How long it takes to feel 10 kicks or movements   How many times your baby moves in 1 hour  Many experts consider at least 10 movements in 2 hours to be a sign that the baby is doing well. But there is no specific cutoff for exactly how much movement is healthy or unhealthy. Some babies are more active than others, and some pregnant people feel movement more easily than others. The main goal of kick counting is to get to know your baby's normal patterns so you can tell if anything changes.  If you are doing kick counting:   Choose a time of day when your baby is usually active.   Find a quiet place where you will not be distracted.   Lie down on your side in a comfortable position.   Check the clock, or set a timer.   Each time you feel your baby move or kick, write down the time. Some people use a smartphone halina to keep track.   If your baby seems less active than usual, try moving around, eating a snack, and emptying your bladder. This can help wake the baby up if they are asleep.   Stop counting after you have felt 10 kicks, or after the length of time your doctor or midwife told you.  When should I call the doctor? -- Call your doctor or midwife for advice if:   You have concerns about your baby's movement.   Your baby is moving less than they " normally do.   You notice a sudden change in the pattern of your baby's movements.   You have any other symptoms that worry you.  All topics are updated as new evidence becomes available and our peer review process is complete.  This topic retrieved from [employer] on: Feb 26, 2024.  Topic 764454 Version 1.0  Release: 32.2.4 - C32.56  © 2024 UpToDate, Inc. and/or its affiliates. All rights reserved.  Consumer Information Use and Disclaimer   Disclaimer: This generalized information is a limited summary of diagnosis, treatment, and/or medication information. It is not meant to be comprehensive and should be used as a tool to help the user understand and/or assess potential diagnostic and treatment options. It does NOT include all information about conditions, treatments, medications, side effects, or risks that may apply to a specific patient. It is not intended to be medical advice or a substitute for the medical advice, diagnosis, or treatment of a health care provider based on the health care provider's examination and assessment of a patient's specific and unique circumstances. Patients must speak with a health care provider for complete information about their health, medical questions, and treatment options, including any risks or benefits regarding use of medications. This information does not endorse any treatments or medications as safe, effective, or approved for treating a specific patient. UpToDate, Inc. and its affiliates disclaim any warranty or liability relating to this information or the use thereof.The use of this information is governed by the Terms of Use, available at https://www.woltersFilterEasyuwer.com/en/know/clinical-effectiveness-terms. 2024© UpToDate, Inc. and its affiliates and/or licensors. All rights reserved.  Copyright   © 2024 UpToDate, Inc. and/or its affiliates. All rights reserved.      Perineal Massage    Perineal massage is recommended starting after 34 weeks in order to reduce risks of  "perineal tearing during childbirth.  You have been provided and instructional sheet in your yellow 28 week prenatal packet.       Patient Education     How to tell when labor starts   The Basics   Written by the doctors and editors at [facility]   What is labor? -- When a person is pregnant, labor is the process that leads to birth of the baby. Contractions of the uterus are the main sign of labor.  Labor usually starts on its own between 37 and 42 weeks of pregnancy. Your \"due date\" is at 40 weeks.  A pregnancy that lasts 37 to 42 weeks is called a \"term\" pregnancy. When labor starts before 37 weeks, doctors call it \"\" labor.  What are the signs that labor is starting? -- The signs that labor is starting, or about to start, can include the following:   The baby moves lower (or \"drops\") in your belly.   You have increased vaginal discharge that is thick, mucus-like, or slightly bloody. (\"Vaginal discharge\" is the term doctors use to describe the fluid that comes out of the vagina.) The increased vaginal discharge is sometimes called a \"mucus plug\" or a \"bloody show.\"   Your \"water breaks.\" During pregnancy, your baby is in a sac in your uterus and surrounded by a fluid called \"amniotic fluid.\" This sac typically breaks open sometime before your baby is born. When it breaks open, the fluid inside comes out of your vagina. This can feel like a big gush or just a trickle of fluid.   You have low back pain or belly cramps.   You start having contractions. During a contraction, the uterus tightens. This can be painful and make your belly feel hard. After a contraction, the uterus relaxes and the pain goes away. Some people have \"[employer]-Steele contractions\" or \"false-labor contractions.\" These feel like contractions, but they are not true contractions. They do not mean that you are in labor.  How can I tell if I'm having true contractions? -- It can be hard to tell if you are having true contractions or [employer]-Steele " "contractions. But here are some ways to help tell the difference:   True contractions come every few minutes and get more frequent over time. [place]-Steele contractions can come every few minutes, but they don't get more frequent over time.   True contractions don't go away, even when you rest. Frank-Steele contractions usually go away when you rest.   True contractions get stronger and more painful over time. Frank-Steele contractions usually don't get stronger or more painful over time.   True contractions might be felt in your back and front. [place]-Steele contractions are usually only in front.  If you are still not sure whether you are having true contractions, call your doctor or midwife.  What should I do if I start having contractions? -- If you start having contractions, time them to see how far apart they are. That way, you can tell if they get more frequent.  You can time your contractions by keeping track of the time when each contraction starts. If you have a clock with a second hand or a timer on your smartphone, you can also time how long each contraction lasts. Your doctor or midwife will want to know how far apart your contractions are and how long they last.  When should I call my doctor or midwife? -- Call your doctor or midwife if you think that you are in labor. You should also call if any of the following things happen:   You have blood, mucus, or fluid leaking from your vagina.   You have 6 or more contractions in 1 hour. (This means that your contractions are 10 minutes apart or less.)   Your contractions are getting stronger and are painful.  Your doctor or midwife will probably want to see you to do an exam. To tell if you are in labor, they will check your cervix to see if it is opening (\"dilated\") and thinning out. They will see how frequent your contractions are. They might also do other tests.  What if my labor starts too soon? -- If you start having any symptoms of labor before 37 " "weeks, call your doctor right away. They might want to give you medicine to try to stop your labor or help the baby if it is born early.  What if my labor doesn't start on its own? -- If your labor doesn't start on its own, your doctor will talk to you about your options. They might try to start your labor with medicines. This is called \"inducing labor.\"  How long will my labor last? -- If it's your first baby, your labor will probably last for many hours. If it's not your first baby, your labor will probably be shorter.  All topics are updated as new evidence becomes available and our peer review process is complete.  This topic retrieved from [employer] on: Feb 26, 2024.  Topic 22149 Version 12.0  Release: 32.2.4 - C32.56  © 2024 UpToDate, Inc. and/or its affiliates. All rights reserved.  Consumer Information Use and Disclaimer   Disclaimer: This generalized information is a limited summary of diagnosis, treatment, and/or medication information. It is not meant to be comprehensive and should be used as a tool to help the user understand and/or assess potential diagnostic and treatment options. It does NOT include all information about conditions, treatments, medications, side effects, or risks that may apply to a specific patient. It is not intended to be medical advice or a substitute for the medical advice, diagnosis, or treatment of a health care provider based on the health care provider's examination and assessment of a patient's specific and unique circumstances. Patients must speak with a health care provider for complete information about their health, medical questions, and treatment options, including any risks or benefits regarding use of medications. This information does not endorse any treatments or medications as safe, effective, or approved for treating a specific patient. UpToDate, Inc. and its affiliates disclaim any warranty or liability relating to this information or the use thereof.The use of " this information is governed by the Terms of Use, available at https://www.woltersCivolutionuwer.com/en/know/clinical-effectiveness-terms. 2024© [employer] ([employer] and its affiliates and/or licensors. All rights reserved.  Copyright   © 2024 [employer] and/or its affiliates. All rights reserved.

## 2024-07-23 ENCOUNTER — ROUTINE PRENATAL (OUTPATIENT)
Dept: OBGYN CLINIC | Facility: CLINIC | Age: 29
End: 2024-07-23
Payer: COMMERCIAL

## 2024-07-23 VITALS
DIASTOLIC BLOOD PRESSURE: 88 MMHG | WEIGHT: 177.8 LBS | HEIGHT: 64 IN | SYSTOLIC BLOOD PRESSURE: 138 MMHG | OXYGEN SATURATION: 98 % | HEART RATE: 107 BPM | BODY MASS INDEX: 30.35 KG/M2

## 2024-07-23 DIAGNOSIS — Z34.03 PRENATAL CARE, FIRST PREGNANCY IN THIRD TRIMESTER: Primary | ICD-10-CM

## 2024-07-23 DIAGNOSIS — Z3A.38 38 WEEKS GESTATION OF PREGNANCY: ICD-10-CM

## 2024-07-23 LAB
SL AMB  POCT GLUCOSE, UA: NORMAL
SL AMB POCT URINE PROTEIN: NORMAL

## 2024-07-23 PROCEDURE — PNV: Performed by: OBSTETRICS & GYNECOLOGY

## 2024-07-23 PROCEDURE — 81002 URINALYSIS NONAUTO W/O SCOPE: CPT | Performed by: OBSTETRICS & GYNECOLOGY

## 2024-07-23 NOTE — PROGRESS NOTES
Problem   38 Weeks Gestation of Pregnancy    Blood Type: O neg   Rhogam: given 5/10  Pap 10/06/2021. GC/CT neg  PN1 Labs:  done     28 Week Labs:nml  AFP: neg    Level 2: 3/22  FG at 32w on , Dopplers and growth for IUGR. Pt agrees to mIOL at 39 weeks. Consent signed. Message sent to the Pool  Next NST/Ultrasound on    Tdap: declined  Flu: declined  GBS swab: completed   Rhogam given 5/10    Blue folder: given   Yellow folder: given    Breast pump order:ordered  L&D forms: submitted  GBS negative    Delivery consent: signed  IOL: to be scheduled           38 weeks gestation of pregnancy  Zakiya Ozuna is a 28 y.o.   38w4d who presents for routine PNV.  Induction scheduled 24  28 week labs reviewed:   TWG 16.3 kg (36 lb)   Denies OB complaints. Good fetal movement. Denies contractions, cramping, leakage of fluid or vaginal bleeding.   Tdap vaccine declined  Reviewed  labor precautions and FKCs.   Advised to continue  Perineal massage Pregnancy Essential guide and Baby and Me web site recommended

## 2024-07-23 NOTE — PROGRESS NOTES
Please refer to the [facility] ultrasound report in Ob Procedures for additional information regarding today's visit

## 2024-07-25 ENCOUNTER — ULTRASOUND (OUTPATIENT)
Dept: PERINATAL CARE | Facility: OTHER | Age: 29
End: 2024-07-25
Payer: COMMERCIAL

## 2024-07-25 VITALS
WEIGHT: 177.6 LBS | HEART RATE: 88 BPM | DIASTOLIC BLOOD PRESSURE: 50 MMHG | HEIGHT: 64 IN | SYSTOLIC BLOOD PRESSURE: 90 MMHG | BODY MASS INDEX: 30.32 KG/M2

## 2024-07-25 DIAGNOSIS — O36.5930 INTRAUTERINE GROWTH RESTRICTION AFFECTING ANTEPARTUM CARE OF MOTHER IN THIRD TRIMESTER, SINGLE OR UNSPECIFIED FETUS: ICD-10-CM

## 2024-07-25 DIAGNOSIS — Z3A.38 38 WEEKS GESTATION OF PREGNANCY: Primary | ICD-10-CM

## 2024-07-25 PROCEDURE — 59025 FETAL NON-STRESS TEST: CPT | Performed by: OBSTETRICS & GYNECOLOGY

## 2024-07-25 PROCEDURE — 76815 OB US LIMITED FETUS(S): CPT | Performed by: OBSTETRICS & GYNECOLOGY

## 2024-07-25 PROCEDURE — 76820 UMBILICAL ARTERY ECHO: CPT | Performed by: OBSTETRICS & GYNECOLOGY

## 2024-07-25 NOTE — LETTER
July 25, 2024     Karlene Corrigan MD  [address]  [address] [place] [id number]    Patient: Zakiya Ozuna   YOB: 1995   Date of Visit: 7/25/2024       Dear Dr. Corrigan:    Thank you for referring Zakiya Ozuna to me for evaluation. Below are my notes for this consultation.    If you have questions, please do not hesitate to call me. I look forward to following your patient along with you.         Sincerely,        Faraz Palacios MD        CC: No Recipients    Faraz Palacios MD  7/25/2024  9:08 AM  Sign when Signing Visit  Please refer to the [facility] ultrasound report in Ob Procedures for additional information regarding today's visit

## 2024-07-29 ENCOUNTER — HOSPITAL ENCOUNTER (INPATIENT)
Facility: HOSPITAL | Age: 29
LOS: 3 days | Discharge: HOME | End: 2024-08-01
Attending: STUDENT IN AN ORGANIZED HEALTH CARE EDUCATION/TRAINING PROGRAM | Admitting: STUDENT IN AN ORGANIZED HEALTH CARE EDUCATION/TRAINING PROGRAM
Payer: COMMERCIAL

## 2024-07-29 ENCOUNTER — APPOINTMENT (INPATIENT)
Dept: LABOR AND DELIVERY | Facility: HOSPITAL | Age: 29
End: 2024-07-29
Payer: COMMERCIAL

## 2024-07-29 LAB
ERYTHROCYTE [DISTWIDTH] IN BLOOD BY AUTOMATED COUNT: 14.2 % (ref 11.6–15.1)
HCT VFR BLD AUTO: 36.5 % (ref 34.8–46.1)
HGB BLD-MCNC: 11.8 G/DL (ref 11.5–15.4)
MCH RBC QN AUTO: 28.6 PG (ref 26.8–34.3)
MCHC RBC AUTO-ENTMCNC: 32.3 G/DL (ref 31.4–37.4)
MCV RBC AUTO: 88 FL (ref 82–98)
PLATELET # BLD AUTO: 281 THOUSANDS/UL (ref 149–390)
PMV BLD AUTO: 10.6 FL (ref 8.9–12.7)
RBC # BLD AUTO: 4.13 MILLION/UL (ref 3.81–5.12)
WBC # BLD AUTO: 12.75 THOUSAND/UL (ref 4.31–10.16)

## 2024-07-29 PROCEDURE — 85027 COMPLETE CBC AUTOMATED: CPT

## 2024-07-29 PROCEDURE — 86780 TREPONEMA PALLIDUM: CPT

## 2024-07-29 PROCEDURE — 86850 RBC ANTIBODY SCREEN: CPT

## 2024-07-29 PROCEDURE — 86901 BLOOD TYPING SEROLOGIC RH(D): CPT

## 2024-07-29 PROCEDURE — 86900 BLOOD TYPING SEROLOGIC ABO: CPT

## 2024-07-29 PROCEDURE — 86870 RBC ANTIBODY IDENTIFICATION: CPT

## 2024-07-29 RX ORDER — CALCIUM CARBONATE 500 MG/1
1000 TABLET, CHEWABLE ORAL 3 TIMES DAILY PRN
Status: DISCONTINUED | OUTPATIENT
Start: 2024-07-29 | End: 2024-07-31

## 2024-07-29 RX ORDER — SODIUM CHLORIDE, SODIUM LACTATE, POTASSIUM CHLORIDE, CALCIUM CHLORIDE 600; 310; 30; 20 MG/100ML; MG/100ML; MG/100ML; MG/100ML
125 INJECTION, SOLUTION INTRAVENOUS CONTINUOUS
Status: DISCONTINUED | OUTPATIENT
Start: 2024-07-29 | End: 2024-07-31

## 2024-07-29 RX ORDER — ONDANSETRON 2 MG/ML
4 INJECTION INTRAMUSCULAR; INTRAVENOUS EVERY 6 HOURS PRN
Status: DISCONTINUED | OUTPATIENT
Start: 2024-07-29 | End: 2024-07-31

## 2024-07-29 RX ORDER — BUPIVACAINE HYDROCHLORIDE 2.5 MG/ML
30 INJECTION, SOLUTION EPIDURAL; INFILTRATION; INTRACAUDAL ONCE AS NEEDED
Status: DISCONTINUED | OUTPATIENT
Start: 2024-07-29 | End: 2024-07-31

## 2024-07-30 ENCOUNTER — ANESTHESIA EVENT (INPATIENT)
Dept: ANESTHESIOLOGY | Facility: HOSPITAL | Age: 29
End: 2024-07-30
Payer: COMMERCIAL

## 2024-07-30 ENCOUNTER — ANESTHESIA (INPATIENT)
Dept: ANESTHESIOLOGY | Facility: HOSPITAL | Age: 29
End: 2024-07-30
Payer: COMMERCIAL

## 2024-07-30 PROBLEM — Z34.90 ENCOUNTER FOR INDUCTION OF LABOR: Status: ACTIVE | Noted: 2024-01-10

## 2024-07-30 LAB
ABO GROUP BLD: NORMAL
BLD GP AB SCN SERPL QL: POSITIVE
BLOOD GROUP ANTIBODIES SERPL: NORMAL
HOLD SPECIMEN: NORMAL
RH BLD: NEGATIVE
SPECIMEN EXPIRATION DATE: NORMAL
TREPONEMA PALLIDUM IGG+IGM AB [PRESENCE] IN SERUM OR PLASMA BY IMMUNOASSAY: NORMAL

## 2024-07-30 PROCEDURE — G0463 HOSPITAL OUTPT CLINIC VISIT: HCPCS

## 2024-07-30 PROCEDURE — 99214 OFFICE O/P EST MOD 30 MIN: CPT

## 2024-07-30 PROCEDURE — 3E0DXGC INTRODUCTION OF OTHER THERAPEUTIC SUBSTANCE INTO MOUTH AND PHARYNX, EXTERNAL APPROACH: ICD-10-PCS | Performed by: OBSTETRICS & GYNECOLOGY

## 2024-07-30 PROCEDURE — 10907ZC DRAINAGE OF AMNIOTIC FLUID, THERAPEUTIC FROM PRODUCTS OF CONCEPTION, VIA NATURAL OR ARTIFICIAL OPENING: ICD-10-PCS | Performed by: OBSTETRICS & GYNECOLOGY

## 2024-07-30 PROCEDURE — NC001 PR NO CHARGE: Performed by: STUDENT IN AN ORGANIZED HEALTH CARE EDUCATION/TRAINING PROGRAM

## 2024-07-30 RX ORDER — FENTANYL CITRATE 50 UG/ML
INJECTION, SOLUTION INTRAMUSCULAR; INTRAVENOUS AS NEEDED
Status: DISCONTINUED | OUTPATIENT
Start: 2024-07-30 | End: 2024-07-31 | Stop reason: HOSPADM

## 2024-07-30 RX ORDER — OXYTOCIN/RINGER'S LACTATE 30/500 ML
1-30 PLASTIC BAG, INJECTION (ML) INTRAVENOUS
Status: DISCONTINUED | OUTPATIENT
Start: 2024-07-30 | End: 2024-07-31

## 2024-07-30 RX ORDER — EPHEDRINE SULFATE 50 MG/ML
INJECTION INTRAVENOUS AS NEEDED
Status: DISCONTINUED | OUTPATIENT
Start: 2024-07-30 | End: 2024-07-30

## 2024-07-30 RX ORDER — BUPIVACAINE HYDROCHLORIDE 2.5 MG/ML
INJECTION, SOLUTION EPIDURAL; INFILTRATION; INTRACAUDAL AS NEEDED
Status: DISCONTINUED | OUTPATIENT
Start: 2024-07-30 | End: 2024-07-31 | Stop reason: HOSPADM

## 2024-07-30 RX ORDER — BUPIVACAINE HYDROCHLORIDE 5 MG/ML
INJECTION, SOLUTION EPIDURAL; INTRACAUDAL AS NEEDED
Status: DISCONTINUED | OUTPATIENT
Start: 2024-07-30 | End: 2024-07-31 | Stop reason: HOSPADM

## 2024-07-30 RX ADMIN — Medication 50 MCG: at 09:09

## 2024-07-30 RX ADMIN — BUPIVACAINE HYDROCHLORIDE 3 ML: 5 INJECTION, SOLUTION EPIDURAL; INTRACAUDAL at 22:31

## 2024-07-30 RX ADMIN — OXYTOCIN 2 MILLI-UNITS/MIN: 10 INJECTION INTRAVENOUS at 13:12

## 2024-07-30 RX ADMIN — Medication 50 MCG: at 04:30

## 2024-07-30 RX ADMIN — SODIUM CHLORIDE, SODIUM LACTATE, POTASSIUM CHLORIDE, AND CALCIUM CHLORIDE 125 ML/HR: .6; .31; .03; .02 INJECTION, SOLUTION INTRAVENOUS at 06:35

## 2024-07-30 RX ADMIN — SODIUM CHLORIDE, SODIUM LACTATE, POTASSIUM CHLORIDE, AND CALCIUM CHLORIDE 999 ML/HR: .6; .31; .03; .02 INJECTION, SOLUTION INTRAVENOUS at 04:30

## 2024-07-30 RX ADMIN — BUPIVACAINE HYDROCHLORIDE 3 ML: 2.5 INJECTION, SOLUTION EPIDURAL; INFILTRATION; INTRACAUDAL; PERINEURAL at 22:31

## 2024-07-30 RX ADMIN — ROPIVACAINE HYDROCHLORIDE: 2 INJECTION, SOLUTION EPIDURAL; INFILTRATION at 12:30

## 2024-07-30 RX ADMIN — SODIUM CHLORIDE, SODIUM LACTATE, POTASSIUM CHLORIDE, AND CALCIUM CHLORIDE 125 ML/HR: .6; .31; .03; .02 INJECTION, SOLUTION INTRAVENOUS at 10:42

## 2024-07-30 RX ADMIN — ROPIVACAINE HYDROCHLORIDE: 2 INJECTION, SOLUTION EPIDURAL; INFILTRATION at 05:31

## 2024-07-30 RX ADMIN — ROPIVACAINE HYDROCHLORIDE: 2 INJECTION, SOLUTION EPIDURAL; INFILTRATION at 19:35

## 2024-07-30 RX ADMIN — SODIUM CHLORIDE, SODIUM LACTATE, POTASSIUM CHLORIDE, AND CALCIUM CHLORIDE 999 ML/HR: .6; .31; .03; .02 INJECTION, SOLUTION INTRAVENOUS at 05:28

## 2024-07-30 RX ADMIN — FENTANYL CITRATE 100 MCG: 50 INJECTION INTRAMUSCULAR; INTRAVENOUS at 22:31

## 2024-07-30 RX ADMIN — SODIUM CHLORIDE, SODIUM LACTATE, POTASSIUM CHLORIDE, AND CALCIUM CHLORIDE 125 ML/HR: .6; .31; .03; .02 INJECTION, SOLUTION INTRAVENOUS at 18:18

## 2024-07-30 NOTE — OB LABOR/OXYTOCIN SAFETY PROGRESS
Oxytocin Safety Progress Check Note - Zakiya Ozuna 28 y.o. female MRN: [id number]    Unit/Bed#: -01 Encounter: [id number]    Dose (nelida-units/min) Oxytocin: 16 nelida-units/min  Contraction Frequency (minutes): 2-3  Contraction Intensity: Moderate/Strong  Uterine Activity Characteristics: Irregular  Cervical Dilation: 5-6        Cervical Effacement: 80  Fetal Station: -2  Baseline Rate (FHR): 150 bpm  Fetal Heart Rate (FHT): 148 BPM  FHR Category: 1             Vital Signs:   Vitals:    07/30/24 1845   BP: 115/56   Pulse:    Resp:    Temp:    SpO2:      Zakiya has been feeling increasing pressure. SVE as above. Amniotomy performed for clear fluid. FHT cat 1. Continue monitoring and titrating pitocin.    Wanda Falk MD 7/30/2024 7:20 PM

## 2024-07-30 NOTE — OB LABOR/OXYTOCIN SAFETY PROGRESS
Labor Progress Note - Zakiya Ozuna 28 y.o. female MRN: [id number]    Unit/Bed#: -01 Encounter: [id number]          Contraction Intensity: Mild  Uterine Activity Characteristics: Occasional  Cervical Dilation: 1        Cervical Effacement: 50  Fetal Station: -3  Baseline Rate (FHR): 135 bpm  Fetal Heart Rate (FHT): 138 BPM  FHR Category: 1           Vital Signs:   Vitals:    07/29/24 2235   BP: 121/76   Pulse: 93   Resp: 18   Temp: 98.2 °F (36.8 °C)   SpO2: 100%       Notes/comments:   Pt resting comfortably. SVE as above. FHT cat 1 kathy occasionally. FB placed without complications. PO cytotec to be given.    Dr. Mix aware     Daisy Jackson MD 7/30/2024 4:15 AM

## 2024-07-30 NOTE — OB LABOR/OXYTOCIN SAFETY PROGRESS
Labor Progress Note - Zakiya Ozuna 28 y.o. female MRN: [id number]    Unit/Bed#: -01 Encounter: [id number]       Contraction Frequency (minutes): 5-6  Contraction Intensity: Mild  Uterine Activity Characteristics: Irregular  Cervical Dilation: 1        Cervical Effacement: 50  Fetal Station: -3  Baseline Rate (FHR): 140 bpm  Fetal Heart Rate (FHT): 145 BPM  FHR Category: CAT I                Vital Signs:   Vitals:    07/30/24 0824   BP: 100/50   Pulse:    Resp:    Temp:    SpO2:        Notes/comments:   SVE as above. FHT is CAT II secondary to recurrent late decelerations secondary likely to hypotension. She was given fluid bolus and repositioned. Plan to give a second dose of cytotec once FHT return to CAT I .         Dr. Rob Chang aware    Rosy Francis MD 7/30/2024 8:42 AM

## 2024-07-30 NOTE — ANESTHESIA PROCEDURE NOTES
Epidural Block    Patient location during procedure: OB/L&D  Start time: 7/30/2024 5:19 AM  Reason for block: procedure for pain  Staffing  Performed by: Sari Hopson CRNA  Authorized by: Bob Vasquez MD    Preanesthetic Checklist  Completed: patient identified, IV checked, site marked, risks and benefits discussed, surgical consent, monitors and equipment checked, pre-op evaluation and timeout performed  Epidural  Patient position: sitting  Prep: ChloraPrep  Sedation Level: no sedation  Patient monitoring: frequent blood pressure checks, continuous pulse oximetry and heart rate  Approach: midline  Location: lumbar, L3-4  Injection technique: SHARON air  Needle  Needle type: Tuohy   Needle gauge: 17 G  Needle insertion depth: 6 cm  Catheter type: multi-orifice  Catheter size: 19 G  Catheter at skin depth: 11 cm  Catheter securement method: stabilization device and clear occlusive dressing  Test dose: negative  Assessment  Sensory level: T10  Number of attempts: 1negative aspiration for CSF, negative aspiration for heme and no paresthesia on injection  patient tolerated the procedure well with no immediate complications

## 2024-07-30 NOTE — OB LABOR/OXYTOCIN SAFETY PROGRESS
Labor Progress Note - Zakiya Ozuna 28 y.o. female MRN: [id number]    Unit/Bed#: -01 Encounter: [id number]       Contraction Frequency (minutes): 3-4  Contraction Intensity: Mild/Moderate  Uterine Activity Characteristics: Irregular  Cervical Dilation: 4        Cervical Effacement: 60  Fetal Station: -2  Baseline Rate (FHR): 140 bpm  Fetal Heart Rate (FHT): 150 BPM  FHR Category: Cat I               Vital Signs:   Vitals:    07/30/24 1145   BP: 111/64   Pulse:    Resp:    Temp:    SpO2:        Notes/comments:   On SVE the palencia was noted to be at the edge of the external os and dislodged with gentle traction. SVE as above. FHT is CAT I.   Plan to start pitocin at 4 hours post pitocin and recheck in 2 hour/ Consider Amniotomy  at that time.       Dr. Rob le aware    Rosy Francis MD 7/30/2024 11:56 AM

## 2024-07-30 NOTE — PLAN OF CARE
Problem: BIRTH - VAGINAL/ SECTION  Goal: Fetal and maternal status remain reassuring during the birth process  Description: INTERVENTIONS:  - Monitor vital signs  - Monitor fetal heart rate  - Monitor uterine activity  - Monitor labor progression (vaginal delivery)  - DVT prophylaxis  - Antibiotic prophylaxis  Outcome: Progressing  Goal: Emotionally satisfying birthing experience for mother/fetus  Description: Interventions:  - Assess, plan, implement and evaluate the nursing care given to the patient in labor  - Advocate the philosophy that each childbirth experience is a unique experience and support the family's chosen level of involvement and control during the labor process   - Actively participate in both the patient's and family's teaching of the birth process  - Consider cultural, Zoroastrianism and age-specific factors and plan care for the patient in labor  Outcome: Progressing     Problem: PAIN - ADULT  Goal: Verbalizes/displays adequate comfort level or baseline comfort level  Description: Interventions:  - Encourage patient to monitor pain and request assistance  - Assess pain using appropriate pain scale  - Administer analgesics based on type and severity of pain and evaluate response  - Implement non-pharmacological measures as appropriate and evaluate response  - Consider cultural and social influences on pain and pain management  - Notify physician/advanced practitioner if interventions unsuccessful or patient reports new pain  Outcome: Progressing     Problem: INFECTION - ADULT  Goal: Absence or prevention of progression during hospitalization  Description: INTERVENTIONS:  - Assess and monitor for signs and symptoms of infection  - Monitor lab/diagnostic results  - Monitor all insertion sites, i.e. indwelling lines, tubes, and drains  - Monitor endotracheal if appropriate and nasal secretions for changes in amount and color  - [place] appropriate cooling/warming therapies per order  -  Administer medications as ordered  - Instruct and encourage patient and family to use good hand hygiene technique  - Identify and instruct in appropriate isolation precautions for identified infection/condition  Outcome: Progressing  Goal: Absence of fever/infection during neutropenic period  Description: INTERVENTIONS:  - Monitor WBC    Outcome: Progressing     Problem: SAFETY ADULT  Goal: Patient will remain free of falls  Description: INTERVENTIONS:  - Educate patient/family on patient safety including physical limitations  - Instruct patient to call for assistance with activity   - Consult OT/PT to assist with strengthening/mobility   - Keep Call bell within reach  - Keep bed low and locked with side rails adjusted as appropriate  - Keep care items and personal belongings within reach  - Initiate and maintain comfort rounds  - Apply yellow socks and bracelet for high fall risk patients  - Consider moving patient to room near nurses station  Outcome: Progressing  Goal: Maintain or return to baseline ADL function  Description: INTERVENTIONS:  -  Assess patient's ability to carry out ADLs; assess patient's baseline for ADL function and identify physical deficits which impact ability to perform ADLs (bathing, care of mouth/teeth, toileting, grooming, dressing, etc.)  - Assess/evaluate cause of self-care deficits   - Assess range of motion  - Assess patient's mobility; develop plan if impaired  - Assess patient's need for assistive devices and provide as appropriate  - Encourage maximum independence but intervene and supervise when necessary  - Involve family in performance of ADLs  - Assess for home care needs following discharge   - Consider OT consult to assist with ADL evaluation and planning for discharge  - Provide patient education as appropriate  Outcome: Progressing  Goal: Maintains/Returns to pre admission functional level  Description: INTERVENTIONS:  - Perform AM-PAC 6 Click Basic Mobility/ Daily Activity  assessment daily.  - Set and communicate daily mobility goal to care team and patient/family/caregiver.   - Collaborate with rehabilitation services on mobility goals if consulted  - Out of bed for toileting  - Record patient progress and toleration of activity level   Outcome: Progressing     Problem: Knowledge Deficit  Goal: Patient/family/caregiver demonstrates understanding of disease process, treatment plan, medications, and discharge instructions  Description: Complete learning assessment and assess knowledge base.  Interventions:  - Provide teaching at level of understanding  - Provide teaching via preferred learning methods  Outcome: Progressing     Problem: DISCHARGE PLANNING  Goal: Discharge to home or other facility with appropriate resources  Description: INTERVENTIONS:  - Identify barriers to discharge w/patient and caregiver  - Arrange for needed discharge resources and transportation as appropriate  - Identify discharge learning needs (meds, wound care, etc.)  - Arrange for interpretive services to assist at discharge as needed  - Refer to Case Management Department for coordinating discharge planning if the patient needs post-hospital services based on physician/advanced practitioner order or complex needs related to functional status, cognitive ability, or social support system  Outcome: Progressing

## 2024-07-30 NOTE — OB LABOR/OXYTOCIN SAFETY PROGRESS
Oxytocin Safety Progress Check Note - Zakiya Ozuna 28 y.o. female MRN: [id number]    Unit/Bed#: -01 Encounter: [id number]    Dose (nelida-units/min) Oxytocin: 10 nelida-units/min  Contraction Frequency (minutes): 2-4  Contraction Intensity: Moderate/Strong  Uterine Activity Characteristics: Irregular  Cervical Dilation: 4        Cervical Effacement: 70  Fetal Station: -2  Baseline Rate (FHR): 150 bpm  Fetal Heart Rate (FHT): 150 BPM  FHR Category: 1               Vital Signs:   Vitals:    07/30/24 1630   BP: 126/76   Pulse:    Resp:    Temp:    SpO2:        Notes/comments:   SVE as above. Category I tracing. Continue pitocin titration.      Janice Guzman MD 7/30/2024 4:51 PM

## 2024-07-30 NOTE — OB LABOR/OXYTOCIN SAFETY PROGRESS
Oxytocin Safety Progress Check Note - Zakiya Ozuna 28 y.o. female MRN: [id number]    Unit/Bed#: -01 Encounter: [id number]    Dose (nelida-units/min) Oxytocin: 6 nelida-units/min  Contraction Frequency (minutes): 1.5-5  Contraction Intensity: Moderate/Strong  Uterine Activity Characteristics: Irregular  Cervical Dilation: 4        Cervical Effacement: 60  Fetal Station: -2  Baseline Rate (FHR): 150 bpm  Fetal Heart Rate (FHT): 140 BPM  FHR Category: 2               Vital Signs:   Vitals:    07/30/24 1505   BP: 101/52   Pulse: 75   Resp:    Temp:    SpO2:        Notes/comments:   SVE deferred. Patient has been on pitocin for 2 hours. Will plan to recheck at 4 hours or sooner if indicated      Janice Guzman MD 7/30/2024 3:08 PM

## 2024-07-30 NOTE — OB LABOR/OXYTOCIN SAFETY PROGRESS
Labor Progress Note - Zakiya Ozuna 28 y.o. female MRN: [id number]    Unit/Bed#: -01 Encounter: [id number]       Contraction Frequency (minutes): 2.5-6.5  Contraction Intensity: Mild  Uterine Activity Characteristics: Irregular  Cervical Dilation: 1        Cervical Effacement: 50  Fetal Station: -3  Baseline Rate (FHR): 140 bpm  Fetal Heart Rate (FHT): 145 BPM  FHR Category: 1           Vital Signs:   Vitals:    07/30/24 0556   BP: 115/66   Pulse: 88   Resp:    Temp:    SpO2:      Zakiya is comfortable with her epidural. SVE as above. Bonilla balloon still in place. Continue monitoring.     Wanda Falk MD 7/30/2024 6:15 AM

## 2024-07-30 NOTE — ASSESSMENT & PLAN NOTE
Recovering well   Routine postpartum care, encourage ambulation, encourage familial bonding  Lactation support for breast/bottle feeding as needed  Pre-delivery Hgb 11.8   FEN: Tolerating regular diet  Pain: Motrin/Tylenol around the clock, oxycodone if needed  Appropriate bowel and bladder function   Postpartum Contraceptive plan: f/u outpatient  DVT Ppx: Ambulation, SCDs

## 2024-07-30 NOTE — ANESTHESIA PREPROCEDURE EVALUATION
Procedure:  LABOR ANALGESIA    Relevant Problems   ANESTHESIA (within normal limits)      CARDIO (within normal limits)      ENDO (within normal limits)      GI/HEPATIC (within normal limits)      /RENAL (within normal limits)      GYN (within normal limits)      HEMATOLOGY (within normal limits)      MUSCULOSKELETAL (within normal limits)      NEURO/PSYCH (within normal limits)      PULMONARY (within normal limits)      Obstetrics/Gynecology   (+) Pregnancy affected by fetal growth restriction      Other   (+) Encounter for induction of labor        Physical Exam    Airway    Mallampati score: II         Dental   No notable dental hx     Cardiovascular  Cardiovascular exam normal    Pulmonary  Pulmonary exam normal     Other Findings  post-pubertal.      Anesthesia Plan  ASA Score- 2     Anesthesia Type- epidural with ASA Monitors.         Additional Monitors:     Airway Plan:            Plan Factors-Exercise tolerance (METS): >4 METS.    Chart reviewed.   Existing labs reviewed. Patient summary reviewed.                  Induction-     Postoperative Plan-     Perioperative Resuscitation Plan - Level 1 - Full Code.       Informed Consent- Anesthetic plan and risks discussed with patient.  I personally reviewed this patient with the CRNA. Discussed and agreed on the Anesthesia Plan with the CRNA..

## 2024-07-30 NOTE — H&P
H & P- Obstetrics   Zakiya Ozuna 28 y.o. female MRN: [id number]  Unit/Bed#: -01 Encounter: [id number]    Assessment: 28 y.o.  at 39w4d admitted for induction of.  SVE: /-3  FHT: Cat 1  Clinical EFW: 3% ; Cephalic confirmed by TAUS  GBS status: neg   Postpartum contraception plan: to be discussed    Plan:   Pregnancy affected by fetal growth restriction  Assessment & Plan  3rd percentile with normal Dopplers.     * Encounter for induction of labor  Assessment & Plan  Admit to OBGYN   Clear liquid diet   F/u T&S, CBC, RPR   IVF LR 125cc/hr   Continuous fetal monitoring and tocometry   Analgesia at maternal request   Vertex by TAUS  Induction plan cytotec, plaencia, pit        Discussed case and plan w/ Dr. López      Chief Complaint: here for my induction    HPI: Zakiya Ozuna is a 28 y.o.  with an KOREY of 2024, by Last Menstrual Period at 39w4d who is being admitted for induction of labor in the setting of FGR. She denies having uterine contractions, has no LOF, and reports no VB. She states she has felt good FM.    Patient Active Problem List   Diagnosis    Encounter for induction of labor    Rh negative state in antepartum period    Pregnancy affected by fetal growth restriction       Baby complications/comments: FGR in 3%    Review of Systems   Constitutional:  Negative for chills and fever.   HENT:  Negative for ear pain and sore throat.    Eyes:  Negative for pain and visual disturbance.   Respiratory:  Negative for cough and shortness of breath.    Cardiovascular:  Negative for chest pain and palpitations.   Gastrointestinal:  Negative for abdominal pain and vomiting.   Genitourinary:  Negative for dysuria and hematuria.   Musculoskeletal:  Negative for arthralgias and back pain.   Skin:  Negative for color change and rash.   Neurological:  Negative for seizures and syncope.   All other systems reviewed and are negative.      OB Hx:  OB History    Para Term  AB  Living   1 0 0 0 0 0   SAB IAB Ectopic Multiple Live Births   0 0 0 0 0      # Outcome Date GA Lbr Brayden/2nd Weight Sex Type Anes PTL Lv   1 Current                Past Medical Hx:  Past Medical History:   Diagnosis Date    Varicella        Past Surgical hx:  Past Surgical History:   Procedure Laterality Date    APPENDECTOMY  2012       No Known Allergies      Medications Prior to Admission:     Prenatal MV-Min-Fe Fum-FA-DHA (PRENATAL 1 PO)    Objective:  Temp:  [98.2 °F (36.8 °C)] 98.2 °F (36.8 °C)  HR:  [93] 93  Resp:  [18] 18  BP: (121)/(76) 121/76  Body mass index is 30.38 kg/m².     Physical Exam:  Physical Exam  Constitutional:       Appearance: Normal appearance.   HENT:      Head: Atraumatic.   Eyes:      Extraocular Movements: Extraocular movements intact.      Conjunctiva/sclera: Conjunctivae normal.   Cardiovascular:      Rate and Rhythm: Normal rate and regular rhythm.      Pulses: Normal pulses.   Pulmonary:      Effort: Pulmonary effort is normal. No respiratory distress.      Breath sounds: Normal breath sounds.   Abdominal:      Palpations: Abdomen is soft.      Tenderness: There is no abdominal tenderness.   Neurological:      General: No focal deficit present.      Mental Status: She is alert and oriented to person, place, and time.   Skin:     General: Skin is warm and dry.   Psychiatric:         Mood and Affect: Mood normal.         Behavior: Behavior normal.   Vitals reviewed. Exam conducted with a chaperone present.            FHT:  Baseline Rate (FHR): 135 bpm  Variability: Moderate  Accelerations: 15 x 15 or greater, Present  Decelerations: None  FHR Category: Category I    TOCO:   Contraction Duration (seconds): 60-80  Contraction Intensity: Mild    Lab Results   Component Value Date    WBC 12.75 (H) 07/29/2024    HGB 11.8 07/29/2024    HCT 36.5 07/29/2024     07/29/2024     Lab Results   Component Value Date    K 4.1 07/13/2024     07/13/2024    CO2 25 07/13/2024    BUN 11  07/13/2024    CREATININE 0.69 07/13/2024    AST 14 07/13/2024    ALT 10 07/13/2024     Prenatal Labs: Reviewed      Blood type: O neg  Antibody: neg  GBS: neg  HIV: neg  Rubella: neg  Syphilis IgM/IgG: neg  HBsAg: neg  HCAb: neg  Chlamydia: neg  Gonorrhea: neg  Diabetes 1 hour screen: 114  3 hour glucose: N/A  Platelets: 334  Hgb: 11.8  >2 Midnights  INPATIENT     Signature/Title: Wanda Falk MD  Date: 7/30/2024  Time: 2:36 AM

## 2024-07-30 NOTE — PLAN OF CARE
Problem: BIRTH - VAGINAL/ SECTION  Goal: Fetal and maternal status remain reassuring during the birth process  Description: INTERVENTIONS:  - Monitor vital signs  - Monitor fetal heart rate  - Monitor uterine activity  - Monitor labor progression (vaginal delivery)  - DVT prophylaxis  - Antibiotic prophylaxis  Outcome: Progressing  Goal: Emotionally satisfying birthing experience for mother/fetus  Description: Interventions:  - Assess, plan, implement and evaluate the nursing care given to the patient in labor  - Advocate the philosophy that each childbirth experience is a unique experience and support the family's chosen level of involvement and control during the labor process   - Actively participate in both the patient's and family's teaching of the birth process  - Consider cultural, Buddhist and age-specific factors and plan care for the patient in labor  Outcome: Progressing     Problem: PAIN - ADULT  Goal: Verbalizes/displays adequate comfort level or baseline comfort level  Description: Interventions:  - Encourage patient to monitor pain and request assistance  - Assess pain using appropriate pain scale  - Administer analgesics based on type and severity of pain and evaluate response  - Implement non-pharmacological measures as appropriate and evaluate response  - Consider cultural and social influences on pain and pain management  - Notify physician/advanced practitioner if interventions unsuccessful or patient reports new pain  Outcome: Progressing     Problem: INFECTION - ADULT  Goal: Absence or prevention of progression during hospitalization  Description: INTERVENTIONS:  - Assess and monitor for signs and symptoms of infection  - Monitor lab/diagnostic results  - Monitor all insertion sites, i.e. indwelling lines, tubes, and drains  - Monitor endotracheal if appropriate and nasal secretions for changes in amount and color  - [place] appropriate cooling/warming therapies per order  -  Administer medications as ordered  - Instruct and encourage patient and family to use good hand hygiene technique  - Identify and instruct in appropriate isolation precautions for identified infection/condition  Outcome: Progressing  Goal: Absence of fever/infection during neutropenic period  Description: INTERVENTIONS:  - Monitor WBC    Outcome: Progressing     Problem: SAFETY ADULT  Goal: Patient will remain free of falls  Description: INTERVENTIONS:  - Educate patient/family on patient safety including physical limitations  - Instruct patient to call for assistance with activity   - Consult OT/PT to assist with strengthening/mobility   - Keep Call bell within reach  - Keep bed low and locked with side rails adjusted as appropriate  - Keep care items and personal belongings within reach  - Initiate and maintain comfort rounds  - Make Fall Risk Sign visible to staff  - Offer Toileting every  Hours, in advance of need  - Initiate/Maintain alarm  - Obtain necessary fall risk management equipment:   - Apply yellow socks and bracelet for high fall risk patients  - Consider moving patient to room near nurses station  Outcome: Progressing  Goal: Maintain or return to baseline ADL function  Description: INTERVENTIONS:  -  Assess patient's ability to carry out ADLs; assess patient's baseline for ADL function and identify physical deficits which impact ability to perform ADLs (bathing, care of mouth/teeth, toileting, grooming, dressing, etc.)  - Assess/evaluate cause of self-care deficits   - Assess range of motion  - Assess patient's mobility; develop plan if impaired  - Assess patient's need for assistive devices and provide as appropriate  - Encourage maximum independence but intervene and supervise when necessary  - Involve family in performance of ADLs  - Assess for home care needs following discharge   - Consider OT consult to assist with ADL evaluation and planning for discharge  - Provide patient education as  appropriate  Outcome: Progressing  Goal: Maintains/Returns to pre admission functional level  Description: INTERVENTIONS:  - Perform AM-PAC 6 Click Basic Mobility/ Daily Activity assessment daily.  - Set and communicate daily mobility goal to care team and patient/family/caregiver.   - Collaborate with rehabilitation services on mobility goals if consulted  - Perform Range of Motion  times a day.  - Reposition patient every  hours.  - Dangle patient  times a day  - Stand patient  times a day  - Ambulate patient  times a day  - Out of bed to chair  times a day   - Out of bed for meals  times a day  - Out of bed for toileting  - Record patient progress and toleration of activity level   Outcome: Progressing     Problem: Knowledge Deficit  Goal: Patient/family/caregiver demonstrates understanding of disease process, treatment plan, medications, and discharge instructions  Description: Complete learning assessment and assess knowledge base.  Interventions:  - Provide teaching at level of understanding  - Provide teaching via preferred learning methods  Outcome: Progressing     Problem: DISCHARGE PLANNING  Goal: Discharge to home or other facility with appropriate resources  Description: INTERVENTIONS:  - Identify barriers to discharge w/patient and caregiver  - Arrange for needed discharge resources and transportation as appropriate  - Identify discharge learning needs (meds, wound care, etc.)  - Arrange for interpretive services to assist at discharge as needed  - Refer to Case Management Department for coordinating discharge planning if the patient needs post-hospital services based on physician/advanced practitioner order or complex needs related to functional status, cognitive ability, or social support system  Outcome: Progressing

## 2024-07-30 NOTE — PLAN OF CARE
Problem: BIRTH - VAGINAL/ SECTION  Goal: Fetal and maternal status remain reassuring during the birth process  Description: INTERVENTIONS:  - Monitor vital signs  - Monitor fetal heart rate  - Monitor uterine activity  - Monitor labor progression (vaginal delivery)  - DVT prophylaxis  - Antibiotic prophylaxis  2024 by Raphael Longoria RN  Outcome: Progressing  2024 by Raphael Longoria RN  Outcome: Progressing  Goal: Emotionally satisfying birthing experience for mother/fetus  Description: Interventions:  - Assess, plan, implement and evaluate the nursing care given to the patient in labor  - Advocate the philosophy that each childbirth experience is a unique experience and support the family's chosen level of involvement and control during the labor process   - Actively participate in both the patient's and family's teaching of the birth process  - Consider cultural, Gnosticism and age-specific factors and plan care for the patient in labor  2024 by Raphael Longoria RN  Outcome: Progressing  2024 by Raphael Longoria RN  Outcome: Progressing     Problem: PAIN - ADULT  Goal: Verbalizes/displays adequate comfort level or baseline comfort level  Description: Interventions:  - Encourage patient to monitor pain and request assistance  - Assess pain using appropriate pain scale  - Administer analgesics based on type and severity of pain and evaluate response  - Implement non-pharmacological measures as appropriate and evaluate response  - Consider cultural and social influences on pain and pain management  - Notify physician/advanced practitioner if interventions unsuccessful or patient reports new pain  2024 by Raphael Longoria RN  Outcome: Progressing  2024 by Raphael Longoria RN  Outcome: Progressing     Problem: INFECTION - ADULT  Goal: Absence or prevention of progression during hospitalization  Description:  INTERVENTIONS:  - Assess and monitor for signs and symptoms of infection  - Monitor lab/diagnostic results  - Monitor all insertion sites, i.e. indwelling lines, tubes, and drains  - Monitor endotracheal if appropriate and nasal secretions for changes in amount and color  - [place] appropriate cooling/warming therapies per order  - Administer medications as ordered  - Instruct and encourage patient and family to use good hand hygiene technique  - Identify and instruct in appropriate isolation precautions for identified infection/condition  7/30/2024 1635 by Raphael Longoria RN  Outcome: Progressing  7/30/2024 0918 by Raphael Longoria RN  Outcome: Progressing  Goal: Absence of fever/infection during neutropenic period  Description: INTERVENTIONS:  - Monitor WBC    7/30/2024 1635 by Raphael Longoria RN  Outcome: Progressing  7/30/2024 0918 by Raphael Longoria RN  Outcome: Progressing     Problem: SAFETY ADULT  Goal: Patient will remain free of falls  Description: INTERVENTIONS:  - Educate patient/family on patient safety including physical limitations  - Instruct patient to call for assistance with activity   - Consult OT/PT to assist with strengthening/mobility   - Keep Call bell within reach  - Keep bed low and locked with side rails adjusted as appropriate  - Keep care items and personal belongings within reach  - Initiate and maintain comfort rounds  - Make Fall Risk Sign visible to staff  - Offer Toileting every  Hours, in advance of need  - Initiate/Maintain alarm  - Obtain necessary fall risk management equipment:   - Apply yellow socks and bracelet for high fall risk patients  - Consider moving patient to room near nurses station  7/30/2024 1635 by Raphael Longoria RN  Outcome: Progressing  7/30/2024 0918 by Raphael Longoria RN  Outcome: Progressing  Goal: Maintain or return to baseline ADL function  Description: INTERVENTIONS:  -  Assess patient's ability to carry out  ADLs; assess patient's baseline for ADL function and identify physical deficits which impact ability to perform ADLs (bathing, care of mouth/teeth, toileting, grooming, dressing, etc.)  - Assess/evaluate cause of self-care deficits   - Assess range of motion  - Assess patient's mobility; develop plan if impaired  - Assess patient's need for assistive devices and provide as appropriate  - Encourage maximum independence but intervene and supervise when necessary  - Involve family in performance of ADLs  - Assess for home care needs following discharge   - Consider OT consult to assist with ADL evaluation and planning for discharge  - Provide patient education as appropriate  7/30/2024 1635 by Raphael Longoria RN  Outcome: Progressing  7/30/2024 0918 by Raphael Longoria RN  Outcome: Progressing  Goal: Maintains/Returns to pre admission functional level  Description: INTERVENTIONS:  - Perform AM-PAC 6 Click Basic Mobility/ Daily Activity assessment daily.  - Set and communicate daily mobility goal to care team and patient/family/caregiver.   - Collaborate with rehabilitation services on mobility goals if consulted  - Perform Range of Motion  times a day.  - Reposition patient every  hours.  - Dangle patient  times a day  - Stand patient  times a day  - Ambulate patient  times a day  - Out of bed to chair  times a day   - Out of bed for meals  times a day  - Out of bed for toileting  - Record patient progress and toleration of activity level   7/30/2024 1635 by Raphael Longoria RN  Outcome: Progressing  7/30/2024 0918 by Raphael Longoria RN  Outcome: Progressing     Problem: Knowledge Deficit  Goal: Patient/family/caregiver demonstrates understanding of disease process, treatment plan, medications, and discharge instructions  Description: Complete learning assessment and assess knowledge base.  Interventions:  - Provide teaching at level of understanding  - Provide teaching via preferred learning  methods  7/30/2024 1635 by Rapahel Longoria RN  Outcome: Progressing  7/30/2024 0918 by Raphael Longoria RN  Outcome: Progressing     Problem: DISCHARGE PLANNING  Goal: Discharge to home or other facility with appropriate resources  Description: INTERVENTIONS:  - Identify barriers to discharge w/patient and caregiver  - Arrange for needed discharge resources and transportation as appropriate  - Identify discharge learning needs (meds, wound care, etc.)  - Arrange for interpretive services to assist at discharge as needed  - Refer to Case Management Department for coordinating discharge planning if the patient needs post-hospital services based on physician/advanced practitioner order or complex needs related to functional status, cognitive ability, or social support system  7/30/2024 1635 by Raphael Longoria RN  Outcome: Progressing  7/30/2024 0918 by Raphael Longoria RN  Outcome: Progressing

## 2024-07-31 PROBLEM — Z34.90 ENCOUNTER FOR INDUCTION OF LABOR: Status: RESOLVED | Noted: 2024-01-10 | Resolved: 2024-07-31

## 2024-07-31 LAB
BASE EXCESS BLDCOA CALC-SCNC: -3.8 MMOL/L (ref 3–11)
BASE EXCESS BLDCOV CALC-SCNC: -4 MMOL/L (ref 1–9)
HCO3 BLDCOA-SCNC: 22.2 MMOL/L (ref 17.3–27.3)
HCO3 BLDCOV-SCNC: 21.2 MMOL/L (ref 12.2–28.6)
O2 CT VFR BLDCOA CALC: 12.3 ML/DL
OXYHGB MFR BLDCOA: 50.3 %
OXYHGB MFR BLDCOV: 53.6 %
PCO2 BLDCOA: 43.4 MM[HG] (ref 30–60)
PCO2 BLDCOV: 39.3 MM HG (ref 27–43)
PH BLDCOA: 7.33 [PH] (ref 7.23–7.43)
PH BLDCOV: 7.35 [PH] (ref 7.19–7.49)
PO2 BLDCOA: 22.4 MM HG (ref 5–25)
PO2 BLDCOV: 24.4 MM HG (ref 15–45)
SAO2 % BLDCOV: 13.3 ML/DL

## 2024-07-31 PROCEDURE — 82805 BLOOD GASES W/O2 SATURATION: CPT | Performed by: OBSTETRICS & GYNECOLOGY

## 2024-07-31 PROCEDURE — 59400 OBSTETRICAL CARE: CPT | Performed by: OBSTETRICS & GYNECOLOGY

## 2024-07-31 PROCEDURE — 0KQM0ZZ REPAIR PERINEUM MUSCLE, OPEN APPROACH: ICD-10-PCS | Performed by: OBSTETRICS & GYNECOLOGY

## 2024-07-31 PROCEDURE — 3E0334Z INTRODUCTION OF SERUM, TOXOID AND VACCINE INTO PERIPHERAL VEIN, PERCUTANEOUS APPROACH: ICD-10-PCS | Performed by: OBSTETRICS & GYNECOLOGY

## 2024-07-31 RX ORDER — SENNOSIDES 8.6 MG
1 TABLET ORAL DAILY
Status: DISCONTINUED | OUTPATIENT
Start: 2024-07-31 | End: 2024-08-01 | Stop reason: HOSPADM

## 2024-07-31 RX ORDER — CALCIUM CARBONATE 500 MG/1
1000 TABLET, CHEWABLE ORAL DAILY PRN
Status: DISCONTINUED | OUTPATIENT
Start: 2024-07-31 | End: 2024-08-01 | Stop reason: HOSPADM

## 2024-07-31 RX ORDER — DIPHENHYDRAMINE HYDROCHLORIDE 50 MG/ML
25 INJECTION INTRAMUSCULAR; INTRAVENOUS EVERY 6 HOURS PRN
Status: DISCONTINUED | OUTPATIENT
Start: 2024-07-31 | End: 2024-08-01 | Stop reason: HOSPADM

## 2024-07-31 RX ORDER — BENZOCAINE/MENTHOL 6 MG-10 MG
1 LOZENGE MUCOUS MEMBRANE DAILY PRN
Status: DISCONTINUED | OUTPATIENT
Start: 2024-07-31 | End: 2024-08-01 | Stop reason: HOSPADM

## 2024-07-31 RX ORDER — ONDANSETRON 2 MG/ML
4 INJECTION INTRAMUSCULAR; INTRAVENOUS EVERY 8 HOURS PRN
Status: DISCONTINUED | OUTPATIENT
Start: 2024-07-31 | End: 2024-08-01 | Stop reason: HOSPADM

## 2024-07-31 RX ORDER — ACETAMINOPHEN 325 MG/1
975 TABLET ORAL EVERY 6 HOURS
Status: DISCONTINUED | OUTPATIENT
Start: 2024-07-31 | End: 2024-08-01 | Stop reason: HOSPADM

## 2024-07-31 RX ORDER — CLONIDINE 100 UG/ML
INJECTION, SOLUTION EPIDURAL AS NEEDED
Status: DISCONTINUED | OUTPATIENT
Start: 2024-07-31 | End: 2024-07-31 | Stop reason: HOSPADM

## 2024-07-31 RX ORDER — IBUPROFEN 600 MG/1
600 TABLET ORAL EVERY 6 HOURS SCHEDULED
Status: DISCONTINUED | OUTPATIENT
Start: 2024-07-31 | End: 2024-08-01 | Stop reason: HOSPADM

## 2024-07-31 RX ORDER — OXYTOCIN/RINGER'S LACTATE 30/500 ML
250 PLASTIC BAG, INJECTION (ML) INTRAVENOUS ONCE
Status: DISCONTINUED | OUTPATIENT
Start: 2024-07-31 | End: 2024-08-01 | Stop reason: HOSPADM

## 2024-07-31 RX ADMIN — SODIUM CHLORIDE, SODIUM LACTATE, POTASSIUM CHLORIDE, AND CALCIUM CHLORIDE 250 ML/HR: .6; .31; .03; .02 INJECTION, SOLUTION INTRAVENOUS at 01:25

## 2024-07-31 RX ADMIN — CLONIDINE HYDROCHLORIDE 100 MCG: 0.1 INJECTION, SOLUTION EPIDURAL at 00:06

## 2024-07-31 RX ADMIN — IBUPROFEN 600 MG: 600 TABLET ORAL at 16:04

## 2024-07-31 RX ADMIN — ACETAMINOPHEN 975 MG: 325 TABLET, FILM COATED ORAL at 08:41

## 2024-07-31 RX ADMIN — SENNOSIDES 8.6 MG: 8.6 TABLET, FILM COATED ORAL at 08:42

## 2024-07-31 RX ADMIN — IBUPROFEN 600 MG: 600 TABLET ORAL at 02:52

## 2024-07-31 RX ADMIN — WITCH HAZEL 1 PAD: 500 SOLUTION RECTAL; TOPICAL at 02:52

## 2024-07-31 RX ADMIN — HYDROCORTISONE 1 APPLICATION: 1 CREAM TOPICAL at 02:52

## 2024-07-31 RX ADMIN — IBUPROFEN 600 MG: 600 TABLET ORAL at 08:41

## 2024-07-31 RX ADMIN — BENZOCAINE AND LEVOMENTHOL 1 APPLICATION: 200; 5 SPRAY TOPICAL at 02:52

## 2024-07-31 RX ADMIN — ACETAMINOPHEN 975 MG: 325 TABLET, FILM COATED ORAL at 19:41

## 2024-07-31 RX ADMIN — BUPIVACAINE HYDROCHLORIDE 3 ML: 2.5 INJECTION, SOLUTION EPIDURAL; INFILTRATION; INTRACAUDAL; PERINEURAL at 00:06

## 2024-07-31 RX ADMIN — BUPIVACAINE HYDROCHLORIDE 3 ML: 5 INJECTION, SOLUTION EPIDURAL; INTRACAUDAL at 00:06

## 2024-07-31 RX ADMIN — IBUPROFEN 600 MG: 600 TABLET ORAL at 23:20

## 2024-07-31 RX ADMIN — ACETAMINOPHEN 975 MG: 325 TABLET, FILM COATED ORAL at 02:52

## 2024-07-31 NOTE — L&D DELIVERY NOTE
Vaginal Delivery Summary - OB/GYN   Zakiya Ozuna 28 y.o. female MRN: [id number]  Unit/Bed#:   Encounter: [id number]          Predelivery Diagnosis:  1. Pregnancy at 39weeks     Postdelivery Diagnosis:  1. Same as above  2. Delivery of female term     Procedure: Spontaneous Vaginal Delivery, repair of 2nd degree laceration    Attending: Dr. Corrigan    Assistant: Dr. Falk    Anesthesia: Epidural    QBL: 100cc  Admission H.8  Admission platelets: 281    Complications: none apparent    Specimens: cord blood, arterial and venous cord blood gasses, placenta to storage    Findings:   1. Viable female at 0128, with APGARS of 9 and 9 at 1 and 5 minutes respectively,  2. Spontaneous delivery of intact placenta at 0137  3. 2 degree laceration repaired with 3-0 Vicryl  4. Blood gases:    Umbilical Cord Venous Blood Gas:  Results from last 7 days   Lab Units 24  0133   PH COV  7.349   PCO2 COV mm HG 39.3   HCO3 COV mmol/L 21.2   BASE EXC COV mmol/L -4.0*   O2 CT CD VB mL/dL 13.3   O2 HGB, VENOUS CORD % 53.6     Umbilical Cord Arterial Blood Gas:  Results from last 7 days   Lab Units 24  0133   PH COA  7.327   PCO2 COA  43.4   PO2 COA mm HG 22.4   HCO3 COA mmol/L 22.2   BASE EXC COA mmol/L -3.8*   O2 CONTENT CORD ART ml/dl 12.3   O2 HGB, ARTERIAL CORD % 50.3       Disposition:  Patient tolerated the procedure well and was recovering in labor and delivery room     Brief history and labor course:  Zakiya Ozuna is a 28 y.o.  with an KOREY of 2024, by Last Menstrual Period at 39w4d who is being admitted for induction of labor in the setting of FGR. She denies having uterine contractions, has no LOF, and reports no VB. She states she has felt good FM.     Patient's SVE was 150/-3 on arrival. A palencia balloon was placed and she received oral cytotec. Patient opted for an epidural at that time. She continued progressing and pitocin was started. Amniotomy was performed for clear fluid. She  progressed to completion and began pushing.     Description of procedure    After pushing for 78 minutes, at 0128 patient delivered a viable female , wt pending, apgars of 9 (1 min) and 9 (5 min). The fetal vertex delivered spontaneously. Baby was checked for nuchal. None were noted. The anterior shoulder delivered atraumatically with maternal expulsive forces and the assistance of downward traction. The posterior shoulder delivered with maternal expulsive forces and the assistance of upward traction. The remainder of the fetus delivered spontaneously.     Upon delivery, the infant was placed on the mothers abdomen and the cord was clamped and cut. Delayed cord clamping was performed.  The infant was noted to cry spontaneously and was moving all extremities appropriately. There was no evidence for injury. Awaiting nurse resuscitators evaluated the . Arterial and venous cord blood gases and cord blood was collected for analysis. These were promptly sent to the lab. In the immediate post-partum, 30 units of IV pitocin was administered, and the uterus was noted to contract down well with massage and pitocin. The placenta delivered spontaneously at 0137 and was noted to have a centrally inserted 3 vessel cord.     The vagina, cervix, perineum, and rectum were inspected and there was noted to be a small second degree tear. It was repaired appropriately.    At the conclusion of the procedure, all needle, sponge, and instrument counts were noted to be correct. Patient tolerated the procedure well and was allowed to recover in labor and delivery room with family and  before being transferred to the post-partum floor. Dr. Corriagn was present and participated in all key portions of the case.        Wanda Falk MD  2024  2:12 AM

## 2024-07-31 NOTE — PLAN OF CARE
Problem: PAIN - ADULT  Goal: Verbalizes/displays adequate comfort level or baseline comfort level  Description: Interventions:  - Encourage patient to monitor pain and request assistance  - Assess pain using appropriate pain scale  - Administer analgesics based on type and severity of pain and evaluate response  - Implement non-pharmacological measures as appropriate and evaluate response  - Consider cultural and social influences on pain and pain management  - Notify physician/advanced practitioner if interventions unsuccessful or patient reports new pain  Outcome: Progressing     Problem: INFECTION - ADULT  Goal: Absence or prevention of progression during hospitalization  Description: INTERVENTIONS:  - Assess and monitor for signs and symptoms of infection  - Monitor lab/diagnostic results  - Monitor all insertion sites, i.e. indwelling lines, tubes, and drains  - Monitor endotracheal if appropriate and nasal secretions for changes in amount and color  - [place] appropriate cooling/warming therapies per order  - Administer medications as ordered  - Instruct and encourage patient and family to use good hand hygiene technique  - Identify and instruct in appropriate isolation precautions for identified infection/condition  Outcome: Progressing  Goal: Absence of fever/infection during neutropenic period  Description: INTERVENTIONS:  - Monitor WBC    Outcome: Progressing     Problem: SAFETY ADULT  Goal: Patient will remain free of falls  Description: INTERVENTIONS:  - Educate patient/family on patient safety including physical limitations  - Instruct patient to call for assistance with activity   - Consult OT/PT to assist with strengthening/mobility   - Keep Call bell within reach  - Keep bed low and locked with side rails adjusted as appropriate  - Keep care items and personal belongings within reach  - Initiate and maintain comfort rounds  - Make Fall Risk Sign visible to staff  - Offer Toileting every  Hours,  in advance of need  - Initiate/Maintain alarm  - Obtain necessary fall risk management equipment:   - Apply yellow socks and bracelet for high fall risk patients  - Consider moving patient to room near nurses station  Outcome: Progressing  Goal: Maintain or return to baseline ADL function  Description: INTERVENTIONS:  -  Assess patient's ability to carry out ADLs; assess patient's baseline for ADL function and identify physical deficits which impact ability to perform ADLs (bathing, care of mouth/teeth, toileting, grooming, dressing, etc.)  - Assess/evaluate cause of self-care deficits   - Assess range of motion  - Assess patient's mobility; develop plan if impaired  - Assess patient's need for assistive devices and provide as appropriate  - Encourage maximum independence but intervene and supervise when necessary  - Involve family in performance of ADLs  - Assess for home care needs following discharge   - Consider OT consult to assist with ADL evaluation and planning for discharge  - Provide patient education as appropriate  Outcome: Progressing  Goal: Maintains/Returns to pre admission functional level  Description: INTERVENTIONS:  - Perform AM-PAC 6 Click Basic Mobility/ Daily Activity assessment daily.  - Set and communicate daily mobility goal to care team and patient/family/caregiver.   - Collaborate with rehabilitation services on mobility goals if consulted  - Perform Range of Motion  times a day.  - Reposition patient every  hours.  - Dangle patient  times a day  - Stand patient times a day  - Ambulate patient  times a day  - Out of bed to chair  times a day   - Out of bed for meals times a day  - Out of bed for toileting  - Record patient progress and toleration of activity level   Outcome: Progressing     Problem: DISCHARGE PLANNING  Goal: Discharge to home or other facility with appropriate resources  Description: INTERVENTIONS:  - Identify barriers to discharge w/patient and caregiver  - Arrange for  needed discharge resources and transportation as appropriate  - Identify discharge learning needs (meds, wound care, etc.)  - Arrange for interpretive services to assist at discharge as needed  - Refer to Case Management Department for coordinating discharge planning if the patient needs post-hospital services based on physician/advanced practitioner order or complex needs related to functional status, cognitive ability, or social support system  Outcome: Progressing     Problem: POSTPARTUM  Goal: Experiences normal postpartum course  Description: INTERVENTIONS:  - Monitor maternal vital signs  - Assess uterine involution and lochia  Outcome: Progressing  Goal: Appropriate maternal -  bonding  Description: INTERVENTIONS:  - Identify family support  - Assess for appropriate maternal/infant bonding   -Encourage maternal/infant bonding opportunities  - Referral to  or  as needed  Outcome: Progressing  Goal: Establishment of infant feeding pattern  Description: INTERVENTIONS:  - Assess breast/bottle feeding  - Refer to lactation as needed  Outcome: Progressing  Goal: Incision(s), wounds(s) or drain site(s) healing without S/S of infection  Description: INTERVENTIONS  - Assess and document dressing, incision, wound bed, drain sites and surrounding tissue  - Provide patient and family education  - Perform skin care/dressing changes every   Outcome: Progressing

## 2024-07-31 NOTE — PLAN OF CARE
Problem: BIRTH - VAGINAL/ SECTION  Goal: Fetal and maternal status remain reassuring during the birth process  Description: INTERVENTIONS:  - Monitor vital signs  - Monitor fetal heart rate  - Monitor uterine activity  - Monitor labor progression (vaginal delivery)  - DVT prophylaxis  - Antibiotic prophylaxis  Outcome: Progressing  Goal: Emotionally satisfying birthing experience for mother/fetus  Description: Interventions:  - Assess, plan, implement and evaluate the nursing care given to the patient in labor  - Advocate the philosophy that each childbirth experience is a unique experience and support the family's chosen level of involvement and control during the labor process   - Actively participate in both the patient's and family's teaching of the birth process  - Consider cultural, Protestant and age-specific factors and plan care for the patient in labor  Outcome: Progressing     Problem: PAIN - ADULT  Goal: Verbalizes/displays adequate comfort level or baseline comfort level  Description: Interventions:  - Encourage patient to monitor pain and request assistance  - Assess pain using appropriate pain scale  - Administer analgesics based on type and severity of pain and evaluate response  - Implement non-pharmacological measures as appropriate and evaluate response  - Consider cultural and social influences on pain and pain management  - Notify physician/advanced practitioner if interventions unsuccessful or patient reports new pain  Outcome: Progressing     Problem: INFECTION - ADULT  Goal: Absence or prevention of progression during hospitalization  Description: INTERVENTIONS:  - Assess and monitor for signs and symptoms of infection  - Monitor lab/diagnostic results  - Monitor all insertion sites, i.e. indwelling lines, tubes, and drains  - Monitor endotracheal if appropriate and nasal secretions for changes in amount and color  - [place] appropriate cooling/warming therapies per order  -  Administer medications as ordered  - Instruct and encourage patient and family to use good hand hygiene technique  - Identify and instruct in appropriate isolation precautions for identified infection/condition  Outcome: Progressing  Goal: Absence of fever/infection during neutropenic period  Description: INTERVENTIONS:  - Monitor WBC    Outcome: Progressing     Problem: SAFETY ADULT  Goal: Patient will remain free of falls  Description: INTERVENTIONS:  - Educate patient/family on patient safety including physical limitations  - Instruct patient to call for assistance with activity   - Consult OT/PT to assist with strengthening/mobility   - Keep Call bell within reach  - Keep bed low and locked with side rails adjusted as appropriate  - Keep care items and personal belongings within reach  - Initiate and maintain comfort rounds  - Make Fall Risk Sign visible to staff  - Apply yellow socks and bracelet for high fall risk patients  - Consider moving patient to room near nurses station  Outcome: Progressing  Goal: Maintain or return to baseline ADL function  Description: INTERVENTIONS:  -  Assess patient's ability to carry out ADLs; assess patient's baseline for ADL function and identify physical deficits which impact ability to perform ADLs (bathing, care of mouth/teeth, toileting, grooming, dressing, etc.)  - Assess/evaluate cause of self-care deficits   - Assess range of motion  - Assess patient's mobility; develop plan if impaired  - Assess patient's need for assistive devices and provide as appropriate  - Encourage maximum independence but intervene and supervise when necessary  - Involve family in performance of ADLs  - Assess for home care needs following discharge   - Consider OT consult to assist with ADL evaluation and planning for discharge  - Provide patient education as appropriate  Outcome: Progressing  Goal: Maintains/Returns to pre admission functional level  Description: INTERVENTIONS:  - Perform  AM-PAC 6 Click Basic Mobility/ Daily Activity assessment daily.  - Set and communicate daily mobility goal to care team and patient/family/caregiver.   - Collaborate with rehabilitation services on mobility goals if consulted  - Out of bed for toileting  - Record patient progress and toleration of activity level   Outcome: Progressing     Problem: Knowledge Deficit  Goal: Patient/family/caregiver demonstrates understanding of disease process, treatment plan, medications, and discharge instructions  Description: Complete learning assessment and assess knowledge base.  Interventions:  - Provide teaching at level of understanding  - Provide teaching via preferred learning methods  Outcome: Progressing     Problem: DISCHARGE PLANNING  Goal: Discharge to home or other facility with appropriate resources  Description: INTERVENTIONS:  - Identify barriers to discharge w/patient and caregiver  - Arrange for needed discharge resources and transportation as appropriate  - Identify discharge learning needs (meds, wound care, etc.)  - Arrange for interpretive services to assist at discharge as needed  - Refer to Case Management Department for coordinating discharge planning if the patient needs post-hospital services based on physician/advanced practitioner order or complex needs related to functional status, cognitive ability, or social support system  Outcome: Progressing

## 2024-07-31 NOTE — PLAN OF CARE
Problem: BIRTH - VAGINAL/ SECTION  Goal: Fetal and maternal status remain reassuring during the birth process  Description: INTERVENTIONS:  - Monitor vital signs  - Monitor fetal heart rate  - Monitor uterine activity  - Monitor labor progression (vaginal delivery)  - DVT prophylaxis  - Antibiotic prophylaxis  2024 by Lorna Blackburn RN  Outcome: Completed  2024 by Lorna Blackburn RN  Outcome: Progressing  Goal: Emotionally satisfying birthing experience for mother/fetus  Description: Interventions:  - Assess, plan, implement and evaluate the nursing care given to the patient in labor  - Advocate the philosophy that each childbirth experience is a unique experience and support the family's chosen level of involvement and control during the labor process   - Actively participate in both the patient's and family's teaching of the birth process  - Consider cultural, Adventism and age-specific factors and plan care for the patient in labor  2024 by Lorna Blackburn RN  Outcome: Completed  2024 by Lorna Blackburn RN  Outcome: Progressing     Problem: POSTPARTUM  Goal: Experiences normal postpartum course  Description: INTERVENTIONS:  - Monitor maternal vital signs  - Assess uterine involution and lochia  Outcome: Progressing  Goal: Appropriate maternal -  bonding  Description: INTERVENTIONS:  - Identify family support  - Assess for appropriate maternal/infant bonding   -Encourage maternal/infant bonding opportunities  - Referral to  or  as needed  Outcome: Progressing  Goal: Establishment of infant feeding pattern  Description: INTERVENTIONS:  - Assess breast/bottle feeding  - Refer to lactation as needed  Outcome: Progressing  Goal: Incision(s), wounds(s) or drain site(s) healing without S/S of infection  Description: INTERVENTIONS  - Assess and document dressing, incision, wound bed, drain sites and surrounding tissue  -  Provide patient and family education    Outcome: Progressing     Problem: PAIN - ADULT  Goal: Verbalizes/displays adequate comfort level or baseline comfort level  Description: Interventions:  - Encourage patient to monitor pain and request assistance  - Assess pain using appropriate pain scale  - Administer analgesics based on type and severity of pain and evaluate response  - Implement non-pharmacological measures as appropriate and evaluate response  - Consider cultural and social influences on pain and pain management  - Notify physician/advanced practitioner if interventions unsuccessful or patient reports new pain  7/31/2024 0135 by Lorna Blackburn RN  Outcome: Progressing  7/30/2024 2234 by Lorna Blackburn RN  Outcome: Progressing     Problem: INFECTION - ADULT  Goal: Absence or prevention of progression during hospitalization  Description: INTERVENTIONS:  - Assess and monitor for signs and symptoms of infection  - Monitor lab/diagnostic results  - Monitor all insertion sites, i.e. indwelling lines, tubes, and drains  - Monitor endotracheal if appropriate and nasal secretions for changes in amount and color  - [place] appropriate cooling/warming therapies per order  - Administer medications as ordered  - Instruct and encourage patient and family to use good hand hygiene technique  - Identify and instruct in appropriate isolation precautions for identified infection/condition  7/31/2024 0135 by Lorna Blackburn RN  Outcome: Progressing  7/30/2024 2234 by Lorna Blackburn RN  Outcome: Progressing  Goal: Absence of fever/infection during neutropenic period  Description: INTERVENTIONS:  - Monitor WBC as applicable   7/31/2024 0135 by Lorna Blackburn RN  Outcome: Progressing  7/30/2024 2234 by Lorna Blackburn RN  Outcome: Progressing     Problem: SAFETY ADULT  Goal: Patient will remain free of falls  Description: INTERVENTIONS:  - Educate patient/family on patient safety including physical  limitations  - Instruct patient to call for assistance with activity   - Consult OT/PT to assist with strengthening/mobility   - Keep Call bell within reach  - Keep bed low and locked with side rails adjusted as appropriate  - Keep care items and personal belongings within reach  - Initiate and maintain comfort rounds  - Make Fall Risk Sign visible to staff    - Apply yellow socks and bracelet for high fall risk patients  - Consider moving patient to room near nurses station  7/31/2024 0135 by Lorna Blackburn RN  Outcome: Progressing  7/30/2024 2234 by Lorna Blackburn RN  Outcome: Progressing  Goal: Maintain or return to baseline ADL function  Description: INTERVENTIONS:  -  Assess patient's ability to carry out ADLs; assess patient's baseline for ADL function and identify physical deficits which impact ability to perform ADLs (bathing, care of mouth/teeth, toileting, grooming, dressing, etc.)  - Assess/evaluate cause of self-care deficits   - Assess range of motion  - Assess patient's mobility; develop plan if impaired  - Assess patient's need for assistive devices and provide as appropriate  - Encourage maximum independence but intervene and supervise when necessary  - Involve family in performance of ADLs  - Assess for home care needs following discharge   - Consider OT consult to assist with ADL evaluation and planning for discharge  - Provide patient education as appropriate  7/31/2024 0135 by Lorna Blackburn RN  Outcome: Progressing  7/30/2024 2234 by Lorna Blackburn RN  Outcome: Progressing  Goal: Maintains/Returns to pre admission functional level  Description: INTERVENTIONS:  - Perform AM-PAC 6 Click Basic Mobility/ Daily Activity assessment daily.  - Set and communicate daily mobility goal to care team and patient/family/caregiver.   - Collaborate with rehabilitation services on mobility goals if consulted    - Out of bed for toileting  - Record patient progress and toleration of activity level    7/31/2024 0135 by Lorna Blackburn RN  Outcome: Progressing  7/30/2024 2234 by Lorna Blackburn RN  Outcome: Progressing     Problem: Knowledge Deficit  Goal: Patient/family/caregiver demonstrates understanding of disease process, treatment plan, medications, and discharge instructions  Description: Complete learning assessment and assess knowledge base.  Interventions:  - Provide teaching at level of understanding  - Provide teaching via preferred learning methods  7/31/2024 0135 by Lorna Blackburn RN  Outcome: Completed  7/30/2024 2234 by Lorna Blackburn RN  Outcome: Progressing  Goal: Verbalizes understanding of labor plan  Description: Assess patient/family/caregiver's baseline knowledge level and ability to understand information.  Provide education via patient/family/caregiver's preferred learning method at appropriate level of understanding.     1. Provide teaching at level of understanding.  2. Provide teaching via preferred learning method(s).  7/31/2024 0135 by Lorna Blackburn RN  Outcome: Completed  7/30/2024 2234 by Lorna Blackburn RN  Outcome: Progressing     Problem: DISCHARGE PLANNING  Goal: Discharge to home or other facility with appropriate resources  Description: INTERVENTIONS:  - Identify barriers to discharge w/patient and caregiver  - Arrange for needed discharge resources and transportation as appropriate  - Identify discharge learning needs  - Arrange for interpretive services to assist at discharge as needed  - Refer to Case Management Department for coordinating discharge planning if the patient needs post-hospital services based on physician/advanced practitioner order or complex needs related to functional status, cognitive ability, or social support system  7/31/2024 0135 by Lorna Blackburn RN  Outcome: Progressing  7/30/2024 2234 by Lorna Blackburn RN  Outcome: Progressing     Problem: Labor & Delivery  Goal: Manages discomfort  Description: Assess and monitor  for signs and symptoms of discomfort.  Assess patient's pain level regularly and per hospital policy.  Administer medications as ordered. Support use of nonpharmacological methods to help control pain such as distraction, imagery, relaxation, and application of heat and cold.  Collaborate with interdisciplinary team and patient to determine appropriate pain management plan.    1. Include patient in decisions related to comfort.  2. Offer non-pharmacological pain management interventions.  3. Report ineffective pain management to physician.  7/31/2024 0135 by Lorna Blackburn RN  Outcome: Completed  7/30/2024 2234 by Lorna Blackburn RN  Outcome: Progressing  Goal: Patient vital signs are stable  Description: 1. Assess vital signs - vaginal delivery.  7/31/2024 0135 by Lorna Blackburn RN  Outcome: Completed  7/30/2024 2234 by Lorna Blackburn RN  Outcome: Progressing

## 2024-07-31 NOTE — DISCHARGE SUMMARY
Discharge Summary - OB/GYN  Zakiya Ozuna 28 y.o. female MRN: [id number]  Unit/Bed#:   Encounter: [id number]    Admission Date: 2024     Discharge Date: 24    Admitting Attending: Karlene Corrigan MD    Delivering Attending: Karlene Corrigan MD    Discharging Attending: Dr Weeks    Principal Diagnosis: Pregnancy at 39w5d    Secondary Diagnosis:   1. Delivery of a term female     Procedures: spontaneous vaginal delivery    Anesthesia: none    Hospital course: Zakiya Ozuna is a 28 y.o.  with an KOREY of 2024, by Last Menstrual Period at 39w4d who is being admitted for induction of labor in the setting of FGR. She denies having uterine contractions, has no LOF, and reports no VB. She states she has felt good FM.      Patient's SVE was 1/50/-3 on arrival. A palencia balloon was placed and she received oral cytotec. Patient opted for an epidural at that time. She continued progressing and pitocin was started. Amniotomy was performed for clear fluid. She progressed to completion and began pushing.     She delivered a viable female  on 2024 at 0128. Weight 5lbs 8oz via normal spontaneous vaginal delivery. She sustained a second laceration during delivery which was adequately repaired. Apgars were 9 (1 min) and 9 (5 min).  was transferred to  nursery. Patient tolerated the procedure well.     Her post-delivery course was uncomplicated. Her postpartum pain was well controlled with oral analgesics.    On day of discharge, she was ambulating and able to reasonably perform all ADLs. She was voiding and had appropriate bowel function. Pain was well controlled. She was discharged home on postpartum day #1 without complications. Patient was instructed to follow up with her OB as an outpatient and was given appropriate warnings to call provider if she develops signs of infection or uncontrolled pain.    Complications: none apparent    Condition at discharge:  good     Discharge instructions/Information to patient and family:   See after visit summary for information provided to patient and family.      Provisions for Follow-Up Care:  See after visit summary for information related to follow-up care and any pertinent home health orders.      Disposition: See After Visit Summary for discharge disposition information.    Planned Readmission: No    Discharge medications and instructions:   Discharge instructions/Information to patient and family:   -Do not place anything (no partner, tampons or douche) in your vagina for 6 weeks.  -You may walk for exercise for the first 6 weeks then gradually return to your usual activities.   -Please do not drive for 1 week if you have no stitches and for 2 weeks if you have stitches or underwent a  delivery.    -You may take baths or shower per your preference.   -Please look at your bust (breasts) in the mirror daily and call for redness or tenderness or increased warmth.   -Please call us for temperature > 100.4*F or 38* C, worsening pain or a foul discharge.      Discharge Medications:   Prenatal vitamin daily for 6 months or the duration of nursing whichever is longer.  Motrin 600 mg orally every 6 hours as needed for pain  Tylenol (over the counter) per bottle directions as needed for pain: do NOT use with percocet  Hydrocortisone cream 1% (over the counter) applied 1-2x daily to hemorrhoids as needed

## 2024-07-31 NOTE — UTILIZATION REVIEW
Initial Clinical Review    Admission: Date/Time/Statement:   Admission Orders (From admission, onward)       Ordered        24 2236  Inpatient Admission  Once                          Orders Placed This Encounter   Procedures    Inpatient Admission     Standing Status:   Standing     Number of Occurrences:   1     Order Specific Question:   Level of Care     Answer:   Med Surg [16]     Order Specific Question:   Estimated length of stay     Answer:   More than 2 Midnights     Order Specific Question:   Certification     Answer:   I certify that inpatient services are medically necessary for this patient for a duration of greater than two midnights. See H&P and MD Progress Notes for additional information about the patient's course of treatment.     ED Arrival Information       Expected   2024     Arrival   2024 22:09    Acuity   -              Means of arrival   Walk-In    Escorted by   Family Member    Service   OB/GYN    Admission type   Emergency              Arrival complaint   Pregnant / Scheduled Induction             Chief Complaint   Patient presents with    Scheduled Induction       Initial Presentation: 28 y.o. female presented to L&D as inpatient admission for IOL  at 39w4d. Plan continuous external monitoring, IVF,cytotec palencia balloon, IV pitocin if needed Epidural and IV MGSO 4 if needed and supportive care   SVE: /-3     24  @ 0415  Palencia balloon inserted   PO cytotec  Contraction Intensity: Mild  Uterine Activity Characteristics: Occasional  Cervical Dilation: 1  Cervical Effacement: 50  Fetal Station: -3  Baseline Rate (FHR): 135 bpm  Fetal Heart Rate (FHT): 138 BPM  FHR Category: 1    @ 0527  S/p Epidural     @ 0615  Contraction Frequency (minutes): 2.5-6.5  Contraction Intensity: Mild  Uterine Activity Characteristics: Irregular  Cervical Dilation: 1  Cervical Effacement: 50  Fetal Station: -3  Baseline Rate (FHR): 140 bpm  Fetal Heart Rate (FHT): 145 BPM  FHR  Category: 1    @ 1156  IV Pitocin started  Contraction Frequency (minutes): 3-4  Contraction Intensity: Mild/Moderate  Uterine Activity Characteristics: Irregular  Cervical Dilation: 4  Cervical Effacement: 60  Fetal Station: -2  Baseline Rate (FHR): 140 bpm  Fetal Heart Rate (FHT): 150 BPM  FHR Category: Cat I    @ 1651  Dose (nelida-units/min) Oxytocin: 10 nelida-units/min  Contraction Frequency (minutes): 2-4  Contraction Intensity: Moderate/Strong  Uterine Activity Characteristics: Irregular  Cervical Dilation: 4  Cervical Effacement: 70  Fetal Station: -2  Baseline Rate (FHR): 150 bpm  Fetal Heart Rate (FHT): 150 BPM  FHR Category: 1    @ 1920  AROM clear fluid  Dose (nelida-units/min) Oxytocin: 16 nelida-units/min  Contraction Frequency (minutes): 2-3  Contraction Intensity: Moderate/Strong  Uterine Activity Characteristics: Irregular  Cervical Dilation: 5-6  Cervical Effacement: 80  Fetal Station: -2  Baseline Rate (FHR): 150 bpm  Fetal Heart Rate (FHT): 148 BPM  FHR Category: 1      24   @ 39 5/7 weeks  FEMALE @ 0128  APGAR 9 9  WT 2495  Infant dried suctioned stimulated and warmed. Responded well and taken to NBN       Admitting  Vitals [24]   Temperature Pulse Respirations Blood Pressure SpO2 Pain Score   98.2 °F (36.8 °C) 93 18 121/76 100 % No Pain     Weight (last 2 days)       Date/Time Weight    24 --    Comment rows:    OBSERV: baby rn dolores male rn in room for pending delivery , Dr Rosario and Dr Falk remain in room pushing with pt at 24 0122    24 0024 --    Comment rows:    OBSERV: Dr Rosario in room at 24 0024    24 0023 --    Comment rows:    OBSERV: Dr Falk in room at 24 0023    24 0008 --    Comment rows:    OBSERV: md rep  in room at 24 0008    24 0002 --    Comment rows:    OBSERV: crna in room to bolus pt at 24 0002    24 80.3 (177)            Vital Signs (last 3 days)       Date/Time Temp  Pulse Resp BP SpO2 O2 Device Cardiac (WDL) Patient Position - Orthostatic VS Pain    07/31/24 0841 -- -- -- -- -- -- -- -- No Pain    07/31/24 0835 -- -- -- -- -- None (Room air) WDL -- No Pain    07/31/24 0834 98.1 °F (36.7 °C) 82 18 118/59 -- -- -- Sitting No Pain    07/31/24 0515 97.9 °F (36.6 °C) 77 18 110/63 98 % None (Room air) -- Sitting No Pain    07/31/24 0500 -- -- -- -- -- None (Room air) WDL -- --    07/31/24 0400 -- 76 -- 111/64 -- -- -- -- --    07/31/24 0345 -- 77 -- 105/59 -- -- -- -- No Pain    07/31/24 0330 -- 58 -- 105/57 -- -- -- -- --    07/31/24 0315 -- 88 -- 107/58 -- -- -- -- No Pain    07/31/24 0300 -- 76 -- 112/63 -- -- -- -- --    07/31/24 0252 -- -- -- 105/63 -- -- -- -- No Pain    07/31/24 0245 -- 77 -- 105/63 -- -- -- -- No Pain    07/31/24 0230 -- 100 -- 105/54 -- -- -- -- No Pain    07/31/24 0215 -- 92 -- 106/64 -- -- -- -- No Pain    07/31/24 0200 98.5 °F (36.9 °C) -- 20 -- -- -- -- -- No Pain    07/31/24 0159 -- 92 -- 104/57 97 % -- -- -- --    07/31/24 0158 -- 85 -- -- -- -- -- -- --    07/31/24 0153 -- 86 -- 146/59 -- -- -- -- --    07/31/24 0138 -- 100 -- 125/79 -- -- -- -- --    07/31/24 0124 -- 95 -- 131/77 -- -- -- -- --    07/31/24 0122 -- -- -- -- -- -- -- -- --    OBSERV: susan barba rn in room for pending delivery , Dr Rosario and Dr Falk remain in room pushing with pt at 07/31/24 0122 07/31/24 0108 -- 83 -- 114/57 -- -- -- -- --    07/31/24 0038 -- 91 -- 116/56 -- -- -- -- --    07/31/24 0024 -- -- -- -- -- -- -- -- --    OBSERV: Dr Rosario in room at 07/31/24 0024 07/31/24 0023 -- 82 -- 120/74 -- -- -- -- --    OBSERV: Dr Falk in room at 07/31/24 0023 07/31/24 0016 100 °F (37.8 °C) -- 22 133/71 -- -- -- -- --    07/31/24 0013 -- 103 -- -- -- -- -- -- --    07/31/24 0008 -- -- -- -- -- -- -- -- --    OBSERV: md rep  in room at 07/31/24 0008 07/31/24 0002 -- -- -- -- -- -- -- -- --    OBSERV: crna in room to bolus pt at 07/31/24 0002     07/31/24 0000 -- 125 -- 136/77 -- -- -- -- 10 - Worst Possible Pain    07/30/24 2345 -- 88 -- 114/72 -- -- -- -- --    07/30/24 2330 -- 77 -- 113/58 -- -- -- -- --    07/30/24 2315 -- 82 -- 129/76 -- -- -- -- --    07/30/24 2305 -- -- -- -- -- None (Room air) -- -- --    07/30/24 2301 -- 91 -- -- -- -- -- -- --    07/30/24 2300 98.2 °F (36.8 °C) 86 16 114/58 99 % -- -- Lying 3    07/30/24 2245 -- 80 -- 109/60 -- -- -- -- 3    07/30/24 2230 -- 86 -- 120/57 -- -- -- -- --    07/30/24 2215 -- -- -- 101/56 -- -- -- -- --    07/30/24 2200 98.3 °F (36.8 °C) 88 16 112/66 -- -- -- Lying 8    07/30/24 2145 -- 91 -- 119/70 -- -- -- -- --    07/30/24 2130 -- 78 -- 135/80 -- -- -- -- --    07/30/24 2115 -- 80 -- 134/76 -- -- -- -- --    07/30/24 2100 -- 78 -- 131/72 -- -- -- -- No Pain    07/30/24 2045 -- 66 -- 131/72 -- -- -- -- --    07/30/24 2030 -- 81 -- 140/67 -- -- -- -- --    07/30/24 2015 -- 76 -- 123/56 -- -- -- -- --    07/30/24 2000 -- 74 -- 109/53 -- -- -- -- No Pain    07/30/24 1945 -- 74 -- 106/57 -- -- -- -- --    07/30/24 1930 -- 72 -- 108/55 -- -- -- -- --    07/30/24 1915 -- 71 -- 102/53 -- -- -- -- --    07/30/24 1900 98.4 °F (36.9 °C) 76 18 118/61 -- -- -- Lying --    07/30/24 1845 -- -- -- 115/56 -- -- -- -- --    07/30/24 1838 -- 91 -- 115/56 -- -- -- -- --    07/30/24 1815 -- -- -- 104/56 -- -- -- -- --    07/30/24 1800 99 °F (37.2 °C) -- -- 114/66 -- -- -- -- No Pain    07/30/24 1755 -- -- -- 114/66 -- -- -- -- --    07/30/24 1745 -- -- -- 119/73 -- -- -- -- --    07/30/24 1730 -- -- -- 112/69 -- -- -- -- --    07/30/24 1715 -- -- -- 113/68 -- -- -- -- --    07/30/24 1700 -- -- -- -- -- -- -- -- No Pain    07/30/24 1655 -- -- -- 113/68 -- -- -- -- --    07/30/24 1645 -- -- -- 110/77 -- -- -- -- --    07/30/24 1630 -- -- -- 126/76 -- -- -- -- --    07/30/24 1619 -- 75 -- 126/76 -- -- -- -- --    07/30/24 1615 -- -- -- 112/56 -- -- -- -- --    07/30/24 1602 -- -- -- 103/57 -- -- -- -- --    07/30/24 1600  -- -- -- 103/57 -- -- -- -- No Pain    07/30/24 1545 -- -- -- 101/49 -- -- -- -- --    07/30/24 1530 98.8 °F (37.1 °C) -- -- 109/55 -- -- -- -- --    07/30/24 1505 -- 75 -- 101/52 -- -- -- -- --    07/30/24 1500 -- -- -- 97/49 -- -- -- -- No Pain    07/30/24 1435 -- 69 -- 97/55 -- -- -- -- --    07/30/24 1430 -- -- -- 108/64 -- -- -- -- --    07/30/24 1415 -- -- -- 107/62 -- -- -- -- --    07/30/24 1400 -- -- -- 114/64 -- -- -- -- --    07/30/24 1330 -- -- -- 135/72 -- -- -- -- --    07/30/24 1315 -- -- -- 115/59 -- -- -- -- --    07/30/24 1245 -- -- -- 114/60 -- -- -- -- --    07/30/24 1230 -- -- -- 102/62 -- -- -- -- --    07/30/24 1225 -- -- -- 102/62 -- -- -- -- --    07/30/24 1215 -- -- -- 105/71 -- -- -- -- --    07/30/24 1200 -- -- -- 118/66 -- -- -- -- --    07/30/24 1145 -- -- -- 111/64 -- -- -- -- --    07/30/24 1130 -- -- -- 115/58 -- -- -- -- --    07/30/24 1115 -- -- -- 97/55 -- -- -- -- --    07/30/24 1100 98.7 °F (37.1 °C) -- -- 106/55 -- -- -- -- --    07/30/24 1045 -- -- -- 106/55 -- -- -- -- --    07/30/24 1030 -- -- -- 105/55 -- -- -- -- --    07/30/24 1015 -- -- -- 93/52 -- -- -- -- --    07/30/24 1000 -- 94 -- 101/52 -- -- -- -- --    07/30/24 0945 -- 75 -- 98/56 -- -- -- -- --    07/30/24 0920 -- 96 -- 94/55 -- -- -- -- --    07/30/24 0906 -- 83 -- 96/55 -- -- -- -- --    07/30/24 0853 -- -- -- 105/64 -- -- -- -- --    07/30/24 0845 -- 98 -- 101/60 -- -- -- -- --    07/30/24 0824 -- -- -- 100/50 -- -- -- -- 4    07/30/24 0815 -- 80 -- 89/51 -- -- -- -- --    07/30/24 0800 -- -- -- 89/45 -- -- -- -- --    07/30/24 0750 -- -- -- 120/67 -- -- -- -- --    07/30/24 0730 -- -- -- 106/62 -- -- -- -- --    07/30/24 0715 -- -- -- 112/63 -- -- -- -- --    07/30/24 0700 98.2 °F (36.8 °C) -- -- 118/69 -- -- -- -- No Pain    07/30/24 0637 -- 83 -- 114/68 -- -- -- -- --    07/30/24 0623 -- 93 -- 96/54 -- -- -- -- --    07/30/24 0620 -- 90 -- 113/62 -- -- -- -- --    07/30/24 0556 -- 88 -- 115/66 -- -- -- --  --    07/30/24 0555 -- 87 -- -- -- -- -- -- --    07/30/24 0554 -- 75 -- -- 99 % -- -- -- --    07/30/24 0550 -- 76 -- 112/57 -- -- -- -- --    07/30/24 0549 -- 76 -- -- 99 % -- -- -- --    07/30/24 0548 -- 80 -- 115/63 -- -- -- -- --    07/30/24 0545 -- 94 -- 115/57 -- -- -- -- --    07/30/24 0544 -- 91 -- -- 100 % -- -- -- --    07/30/24 0542 -- 77 -- 116/61 -- -- -- -- --    07/30/24 0539 -- 78 -- 106/56 100 % -- -- -- --    07/30/24 0536 -- 72 -- 117/63 -- -- -- -- --    07/30/24 0535 -- 77 -- -- -- -- -- -- --    07/30/24 0534 -- 71 -- -- 99 % -- -- -- --    07/30/24 0533 -- 69 -- 125/63 -- -- -- -- --    07/30/24 0531 -- -- -- -- -- -- -- -- 9    07/30/24 0530 -- 107 -- 86/50 -- -- -- -- --    07/30/24 0529 -- 89 -- 89/52 100 % -- -- -- --    07/30/24 0527 -- 93 -- 113/62 -- -- -- -- --    07/30/24 0524 -- -- -- 120/67 100 % -- -- -- --    07/30/24 0520 -- 73 -- 120/64 -- -- -- -- --    07/30/24 0500 -- -- -- 110/71 -- -- -- -- --    07/30/24 0445 -- -- -- 111/72 -- -- -- -- --    07/30/24 0146 -- -- -- -- -- -- WDL -- --    07/29/24 2235 98.2 °F (36.8 °C) 93 18 121/76 100 % -- -- Sitting No Pain              Pertinent Labs/Diagnostic Test Results:   Radiology:  No orders to display     Cardiology:  No orders to display     GI:  No orders to display           Results from last 7 days   Lab Units 07/29/24  2323   WBC Thousand/uL 12.75*   HEMOGLOBIN g/dL 11.8   HEMATOCRIT % 36.5   PLATELETS Thousands/uL 281       Past Medical History:   Diagnosis Date    Varicella      Present on Admission:   Pregnancy affected by fetal growth restriction      Admitting Diagnosis: Encounter for planned induction of labor [Z34.90]  Fetal growth restriction antepartum [O36.5990]  Encounter for full-term uncomplicated delivery [O80]  Age/Sex: 28 y.o. female  Admission Orders:  Scheduled Medications:  acetaminophen, 975 mg, Oral, Q6H  ibuprofen, 600 mg, Oral, Q6H BECCA  oxytocin, 250 nelida-units/min, Intravenous, Once  Rho(D) immune  globulin, 300 mcg, Intramuscular, Once  senna, 1 tablet, Oral, Daily      Continuous IV Infusions:     PRN Meds:  benzocaine-menthol-lanolin-aloe, 1 Application, Topical, Q6H PRN  calcium carbonate, 1,000 mg, Oral, Daily PRN  diphenhydrAMINE, 25 mg, Intravenous, Q6H PRN  hydrocortisone, 1 Application, Topical, Daily PRN  ondansetron, 4 mg, Intravenous, Q8H PRN  witch hazel-glycerin, 1 Pad, Topical, Q4H PRN        None    Network Utilization Review Department  ATTENTION: Please call with any questions or concerns to [phone] and carefully listen to the prompts so that you are directed to the right person. All voicemails are confidential.   For Discharge needs, contact Care Management DC Support Team at [phone] opt. [phone]  Send all requests for admission clinical reviews, approved or denied determinations and any other requests to dedicated fax number below belonging to the [facility] where the patient is receiving treatment. List of dedicated fax numbers for the Facilities:  FACILITY NAME UR FAX NUMBER   ADMISSION DENIALS (Administrative/Medical Necessity) [phone]   DISCHARGE SUPPORT TEAM (NETWORK) [phone]   PARENT CHILD HEALTH (Maternity/NICU/Pediatrics) [phone]   [facility] [phone]   [facility] [phone]   [facility] [phone]   [facility] [phone]   [facility] [phone]   [facility] [phone]   [facility] [phone]   [facility] [phone]   [facility] [phone]   [facility] [phone]   [facility] [phone]   [facility] [phone]

## 2024-07-31 NOTE — ANESTHESIA POSTPROCEDURE EVALUATION
Post-Op Assessment Note    CV Status:  Stable    Pain management: adequate      Post-op block assessment: catheter intact   Mental Status:  Alert and awake   Hydration Status:  Euvolemic   PONV Controlled:  Controlled   Airway Patency:  Patent     Post Op Vitals Reviewed: Yes    No anethesia notable event occurred.    Staff: Anesthesiologist               BP      Temp      Pulse     Resp      SpO2

## 2024-07-31 NOTE — PLAN OF CARE
Problem: PAIN - ADULT  Goal: Verbalizes/displays adequate comfort level or baseline comfort level  Description: Interventions:  - Encourage patient to monitor pain and request assistance  - Assess pain using appropriate pain scale  - Administer analgesics based on type and severity of pain and evaluate response  - Implement non-pharmacological measures as appropriate and evaluate response  - Consider cultural and social influences on pain and pain management  - Notify physician/advanced practitioner if interventions unsuccessful or patient reports new pain  Outcome: Progressing     Problem: DISCHARGE PLANNING  Goal: Discharge to home or other facility with appropriate resources  Description: INTERVENTIONS:  - Identify barriers to discharge w/patient and caregiver  - Arrange for needed discharge resources and transportation as appropriate  - Identify discharge learning needs (meds, wound care, etc.)  - Arrange for interpretive services to assist at discharge as needed  - Refer to Case Management Department for coordinating discharge planning if the patient needs post-hospital services based on physician/advanced practitioner order or complex needs related to functional status, cognitive ability, or social support system  Outcome: Progressing     Problem: POSTPARTUM  Goal: Experiences normal postpartum course  Description: INTERVENTIONS:  - Monitor maternal vital signs  - Assess uterine involution and lochia  Outcome: Progressing     Problem: POSTPARTUM  Goal: Appropriate maternal -  bonding  Description: INTERVENTIONS:  - Identify family support  - Assess for appropriate maternal/infant bonding   -Encourage maternal/infant bonding opportunities  - Referral to  or  as needed  Outcome: Progressing     Problem: POSTPARTUM  Goal: Establishment of infant feeding pattern  Description: INTERVENTIONS:  - Assess breast/bottle feeding  - Refer to lactation as needed  Outcome: Progressing

## 2024-07-31 NOTE — LACTATION NOTE
This note was copied from a baby's chart.  Follow up lactation note:    LATCH Documentation   Latch 1   Audible Swallowing 0   Type of Nipple 2   Comfort (Breast/Nipple) 2   Hold (Positioning) 1   LATCH Score 6   Having latch problems? No   Position(s) Used Laid Back   Breasts/Nipples   Left Breast Soft   Right Breast Soft   Left Nipple Everted   Right Nipple Everted   Intervention Breast pump;Hand expression   Breastfeeding Status Yes   Breastfeeding Progress Not yet established   Other OB Lactation Tools   Feeding Devices Syringe   Patient Follow-Up   Lactation Consult Status 2   Follow-Up Type Inpatient;Call as needed   Other OB Lactation Documentation    Additional Problem Noted Assisted with latch, infant latched in laid back and performed a few sucks. Syringe fed 2mL of Sim.     Called to room to assist with latch. Baby brought s2s with mother and is awake and demonstrating hunger cues. Infant obtained latch on to right breast in a laid back position. Infant performed 6 sucks before un-latching self. Multiple attempts were made to re-latch infant with no success.     Mother resumed with pumping.     Taught syringe feeding to FOB who fed 2mL of SIM. Baby demonstrating no further hunger cues after feeding and is asleep with wide open hands.

## 2024-07-31 NOTE — LACTATION NOTE
This note was copied from a baby's chart.  CONSULT - LACTATION  Baby Girl (Kori Ozuna 0 days female MRN: [id number]    [facility] NURSERY Room / Bed: (N)/(N) Encounter: [id number]    Maternal Information     MOTHER:  Zakiya Ozuna  Maternal Age: 28 y.o.  OB History: # 1 - Date: 24, Sex: Female, Weight: 2495 g (5 lb 8 oz), GA: 39w5d, Type: Vaginal, Spontaneous, Apgar1: 9, Apgar5: 9, Living: Living, Birth Comments: None   Previouse breast reduction surgery? No    Lactation history:   Has patient previously breast fed: No   How long had patient previously breast fed:     Previous breast feeding complications:       Past Surgical History:   Procedure Laterality Date    APPENDECTOMY         Birth information:  YOB: 2025   Time of birth: 1:28 AM   Sex: female   Delivery type: Vaginal, Spontaneous   Birth Weight: 2495 g (5 lb 8 oz)   Percent of Weight Change: 0%     Gestational Age: 39w5d   [unfilled]    Assessment     Breast and nipple assessment: normal assessment    [place] Assessment: sleepy, no digital oral exam performed.     Feeding assessment: no latch  LATCH:  Latch: Too sleepy or reluctant, no latch achieved   Audible Swallowing: None   Type of Nipple: Everted (After stimulation)   Comfort (Breast/Nipple): Soft/non-tender   Hold (Positioning): Partial assist, teach one side, mother does other, staff holds   LATCH Score: 5        Having latch problems? No   Position(s) Used Football   Breasts/Nipples   Date Pumping Initiated 24   Time Pumping Initiated 1002   Left Breast Soft   Right Breast Soft   Left Nipple Everted   Right Nipple Everted   Intervention Breast pump;Hand expression   Breastfeeding Status Yes   Breastfeeding Progress Not yet established   Other OB Lactation Tools   Feeding Devices Syringe   Breast Pump   Pump 3;2;1  (Spectra S1 through insurance)   Pump Review/Education Milk storage;Setup, frequency, and cleaning    Initiated by PACO MULLER   Date Initiated 07/31/24   Patient Follow-Up   Lactation Consult Status 2   Follow-Up Type Inpatient;Call as needed   Other OB Lactation Documentation    Additional Problem Noted RSB/DC, Sleepy SGA, no latch, syringe fed 3mL sim, set up pumping       Feeding recommendations:  breast feed on demand  Nursing staff called out for assistance with latch. Infant is very sleepy and on sugar protocols d/t SGA. Infant allowed to do S2S for feeding but not demonstrating hunger cues. Infant aligned on right breast for football hold but remains asleep and does latch.     Mother would like baby to eat and would like to offer supplementation. Reviewed DBM and formula qwith mother, mother chose formula and chose to feed via syringe for this feeding. Syringe feeding taught to mother, infant took 3mL of formula and when offered more began to spit it out.     Mother set up with pumping. Reviewed pumping cycles, frequency or pumping if missing a feeding at the breast, pump cleaning and milk storage guidelines.     Infant swaddled and placed in [place].     RSB and DC booklets at bedside.     Mother has Spectra S1 breast pump for home use.     Encouraged to call for latch support with next feeding, ext. Provided.     Benita Lamar RN 7/31/2024 10:04 AM

## 2024-07-31 NOTE — PLAN OF CARE
Problem: BIRTH - VAGINAL/ SECTION  Goal: Fetal and maternal status remain reassuring during the birth process  Description: INTERVENTIONS:  - Monitor vital signs  - Monitor fetal heart rate  - Monitor uterine activity  - Monitor labor progression (vaginal delivery)  - DVT prophylaxis if applicable   - Antibiotic prophylaxis if applicable   Outcome: Progressing  Goal: Emotionally satisfying birthing experience for mother/fetus  Description: Interventions:  - Assess, plan, implement and evaluate the nursing care given to the patient in labor  - Advocate the philosophy that each childbirth experience is a unique experience and support the family's chosen level of involvement and control during the labor process   - Actively participate in both the patient's and family's teaching of the birth process  - Consider cultural, Buddhist and age-specific factors and plan care for the patient in labor  Outcome: Progressing     Problem: PAIN - ADULT  Goal: Verbalizes/displays adequate comfort level or baseline comfort level  Description: Interventions:  - Encourage patient to monitor pain and request assistance  - Assess pain using appropriate pain scale  - Administer analgesics based on type and severity of pain and evaluate response  - Implement non-pharmacological measures as appropriate and evaluate response  - Consider cultural and social influences on pain and pain management  - Notify physician/advanced practitioner if interventions unsuccessful or patient reports new pain  Outcome: Progressing     Problem: INFECTION - ADULT  Goal: Absence or prevention of progression during hospitalization  Description: INTERVENTIONS:  - Assess and monitor for signs and symptoms of infection  - Monitor lab/diagnostic results  - Monitor all insertion sites, i.e. indwelling lines, tubes, and drains    - [place] appropriate cooling/warming therapies per order  - Administer medications as ordered  - Instruct and encourage  patient and family to use good hand hygiene technique  - Identify and instruct in appropriate isolation precautions for identified infection/condition  Outcome: Progressing  Goal: Absence of fever/infection during neutropenic period  Description: INTERVENTIONS:  - Monitor WBC as applicable     Outcome: Progressing     Problem: SAFETY ADULT  Goal: Patient will remain free of falls  Description: INTERVENTIONS:  - Educate patient/family on patient safety including physical limitations  - Instruct patient to call for assistance with activity   - Consult OT/PT to assist with strengthening/mobility   - Keep Call bell within reach  - Keep bed low and locked with side rails adjusted as appropriate  - Keep care items and personal belongings within reach  - Initiate and maintain comfort rounds  - Make Fall Risk Sign visible to staff  - Apply yellow socks and bracelet for high fall risk patients  - Consider moving patient to room near nurses station  Outcome: Progressing  Goal: Maintain or return to baseline ADL function  Description: INTERVENTIONS:  -  Assess patient's ability to carry out ADLs; assess patient's baseline for ADL function and identify physical deficits which impact ability to perform ADLs (bathing, care of mouth/teeth, toileting, grooming, dressing, etc.)  - Assess/evaluate cause of self-care deficits   - Assess range of motion  - Assess patient's mobility; develop plan if impaired  - Assess patient's need for assistive devices and provide as appropriate  - Encourage maximum independence but intervene and supervise when necessary  - Involve family in performance of ADLs  - Assess for home care needs following discharge   - Consider OT consult to assist with ADL evaluation and planning for discharge  - Provide patient education as appropriate  Outcome: Progressing  Goal: Maintains/Returns to pre admission functional level  Description: INTERVENTIONS:  - Perform AM-PAC 6 Click Basic Mobility/ Daily Activity  assessment daily.  - Set and communicate daily mobility goal to care team and patient/family/caregiver.   - Collaborate with rehabilitation services on mobility goals if consulted    - Out of bed for toileting  - Record patient progress and toleration of activity level   Outcome: Progressing     Problem: Knowledge Deficit  Goal: Patient/family/caregiver demonstrates understanding of disease process, treatment plan, medications, and discharge instructions  Description: Complete learning assessment and assess knowledge base.  Interventions:  - Provide teaching at level of understanding  - Provide teaching via preferred learning methods  Outcome: Progressing  Goal: Verbalizes understanding of labor plan  Description: Assess patient/family/caregiver's baseline knowledge level and ability to understand information.  Provide education via patient/family/caregiver's preferred learning method at appropriate level of understanding.     1. Provide teaching at level of understanding.  2. Provide teaching via preferred learning method(s).  Outcome: Progressing     Problem: DISCHARGE PLANNING  Goal: Discharge to home or other facility with appropriate resources  Description: INTERVENTIONS:  - Identify barriers to discharge w/patient and caregiver  - Arrange for needed discharge resources and transportation as appropriate  - Identify discharge learning needs  - Arrange for interpretive services to assist at discharge as needed  - Refer to Case Management Department for coordinating discharge planning if the patient needs post-hospital services based on physician/advanced practitioner order or complex needs related to functional status, cognitive ability, or social support system  Outcome: Progressing     Problem: Labor & Delivery  Goal: Manages discomfort  Description: Assess and monitor for signs and symptoms of discomfort.  Assess patient's pain level regularly and per hospital policy.  Administer medications as ordered.  Support use of nonpharmacological methods to help control pain such as distraction, imagery, relaxation, and application of heat and cold.  Collaborate with interdisciplinary team and patient to determine appropriate pain management plan.    1. Include patient in decisions related to comfort.  2. Offer non-pharmacological pain management interventions.  3. Report ineffective pain management to physician.  Outcome: Progressing  Goal: Patient vital signs are stable  Description: 1. Assess vital signs - vaginal delivery.  Outcome: Progressing

## 2024-08-01 VITALS
RESPIRATION RATE: 18 BRPM | HEIGHT: 64 IN | WEIGHT: 177 LBS | SYSTOLIC BLOOD PRESSURE: 125 MMHG | HEART RATE: 62 BPM | OXYGEN SATURATION: 99 % | TEMPERATURE: 97.6 F | BODY MASS INDEX: 30.22 KG/M2 | DIASTOLIC BLOOD PRESSURE: 76 MMHG

## 2024-08-01 LAB — PLACENTA IN STORAGE: NORMAL

## 2024-08-01 PROCEDURE — 99024 POSTOP FOLLOW-UP VISIT: CPT | Performed by: OBSTETRICS & GYNECOLOGY

## 2024-08-01 PROCEDURE — NC001 PR NO CHARGE: Performed by: OBSTETRICS & GYNECOLOGY

## 2024-08-01 RX ORDER — IBUPROFEN 600 MG/1
600 TABLET ORAL EVERY 6 HOURS SCHEDULED
Start: 2024-08-01

## 2024-08-01 RX ORDER — BENZOCAINE/MENTHOL 6 MG-10 MG
1 LOZENGE MUCOUS MEMBRANE DAILY PRN
Start: 2024-08-01

## 2024-08-01 RX ORDER — ACETAMINOPHEN 325 MG/1
975 TABLET ORAL EVERY 6 HOURS
Start: 2024-08-01

## 2024-08-01 RX ADMIN — IBUPROFEN 600 MG: 600 TABLET ORAL at 09:39

## 2024-08-01 RX ADMIN — ACETAMINOPHEN 975 MG: 325 TABLET, FILM COATED ORAL at 04:42

## 2024-08-01 RX ADMIN — SENNOSIDES 8.6 MG: 8.6 TABLET, FILM COATED ORAL at 09:39

## 2024-08-01 NOTE — PLAN OF CARE
Problem: PAIN - ADULT  Goal: Verbalizes/displays adequate comfort level or baseline comfort level  Description: Interventions:  - Encourage patient to monitor pain and request assistance  - Assess pain using appropriate pain scale  - Administer analgesics based on type and severity of pain and evaluate response  - Implement non-pharmacological measures as appropriate and evaluate response  - Consider cultural and social influences on pain and pain management  - Notify physician/advanced practitioner if interventions unsuccessful or patient reports new pain  Outcome: Adequate for Discharge     Problem: INFECTION - ADULT  Goal: Absence or prevention of progression during hospitalization  Description: INTERVENTIONS:  - Assess and monitor for signs and symptoms of infection  - Monitor lab/diagnostic results  - Monitor all insertion sites, i.e. indwelling lines, tubes, and drains  - Monitor endotracheal if appropriate and nasal secretions for changes in amount and color  - [place] appropriate cooling/warming therapies per order  - Administer medications as ordered  - Instruct and encourage patient and family to use good hand hygiene technique  - Identify and instruct in appropriate isolation precautions for identified infection/condition  Outcome: Adequate for Discharge  Goal: Absence of fever/infection during neutropenic period  Description: INTERVENTIONS:  - Monitor WBC    Outcome: Adequate for Discharge     Problem: SAFETY ADULT  Goal: Patient will remain free of falls  Description: INTERVENTIONS:  - Educate patient/family on patient safety including physical limitations  - Instruct patient to call for assistance with activity   - Consult OT/PT to assist with strengthening/mobility   - Keep Call bell within reach  - Keep bed low and locked with side rails adjusted as appropriate  - Keep care items and personal belongings within reach  - Initiate and maintain comfort rounds  - Make Fall Risk Sign visible to  staff  - Offer Toileting every  Hours, in advance of need  - Initiate/Maintain alarm  - Obtain necessary fall risk management equipment:   - Apply yellow socks and bracelet for high fall risk patients  - Consider moving patient to room near nurses station  Outcome: Adequate for Discharge  Goal: Maintain or return to baseline ADL function  Description: INTERVENTIONS:  -  Assess patient's ability to carry out ADLs; assess patient's baseline for ADL function and identify physical deficits which impact ability to perform ADLs (bathing, care of mouth/teeth, toileting, grooming, dressing, etc.)  - Assess/evaluate cause of self-care deficits   - Assess range of motion  - Assess patient's mobility; develop plan if impaired  - Assess patient's need for assistive devices and provide as appropriate  - Encourage maximum independence but intervene and supervise when necessary  - Involve family in performance of ADLs  - Assess for home care needs following discharge   - Consider OT consult to assist with ADL evaluation and planning for discharge  - Provide patient education as appropriate  Outcome: Adequate for Discharge  Goal: Maintains/Returns to pre admission functional level  Description: INTERVENTIONS:  - Perform AM-PAC 6 Click Basic Mobility/ Daily Activity assessment daily.  - Set and communicate daily mobility goal to care team and patient/family/caregiver.   - Collaborate with rehabilitation services on mobility goals if consulted  - Perform Range of Motion  times a day.  - Reposition patient every  hours.  - Dangle patient  times a day  - Stand patient  times a day  - Ambulate patient  times a day  - Out of bed to chair  times a day   - Out of bed for meals  times a day  - Out of bed for toileting  - Record patient progress and toleration of activity level   Outcome: Adequate for Discharge     Problem: DISCHARGE PLANNING  Goal: Discharge to home or other facility with appropriate resources  Description:  INTERVENTIONS:  - Identify barriers to discharge w/patient and caregiver  - Arrange for needed discharge resources and transportation as appropriate  - Identify discharge learning needs (meds, wound care, etc.)  - Arrange for interpretive services to assist at discharge as needed  - Refer to Case Management Department for coordinating discharge planning if the patient needs post-hospital services based on physician/advanced practitioner order or complex needs related to functional status, cognitive ability, or social support system  Outcome: Adequate for Discharge     Problem: POSTPARTUM  Goal: Experiences normal postpartum course  Description: INTERVENTIONS:  - Monitor maternal vital signs  - Assess uterine involution and lochia  Outcome: Adequate for Discharge  Goal: Appropriate maternal -  bonding  Description: INTERVENTIONS:  - Identify family support  - Assess for appropriate maternal/infant bonding   -Encourage maternal/infant bonding opportunities  - Referral to  or  as needed  Outcome: Adequate for Discharge  Goal: Establishment of infant feeding pattern  Description: INTERVENTIONS:  - Assess breast/bottle feeding  - Refer to lactation as needed  Outcome: Adequate for Discharge  Goal: Incision(s), wounds(s) or drain site(s) healing without S/S of infection  Description: INTERVENTIONS  - Assess and document dressing, incision, wound bed, drain sites and surrounding tissue  - Provide patient and family education  - Perform skin care/dressing changes every   Outcome: Adequate for Discharge

## 2024-08-01 NOTE — LACTATION NOTE
This note was copied from a baby's chart.  CONSULT - LACTATION  Baby Girl (Kori Ozuna 1 days female MRN: [id number]    [facility] AN NURSERY Room / Bed: (N)/(N) Encounter: [id number]    Maternal Information     MOTHER:  Zakiya Ozuna  Maternal Age: 28 y.o.  OB History: # 1 - Date: 24, Sex: Female, Weight: 2495 g (5 lb 8 oz), GA: 39w5d, Type: Vaginal, Spontaneous, Apgar1: 9, Apgar5: 9, Living: Living, Birth Comments: None   Previouse breast reduction surgery? No    Lactation history:   Has patient previously breast fed: No   How long had patient previously breast fed:     Previous breast feeding complications:       Past Surgical History:   Procedure Laterality Date    APPENDECTOMY         Birth information:  YOB: 2025   Time of birth: 1:28 AM   Sex: female   Delivery type: Vaginal, Spontaneous   Birth Weight: 2495 g (5 lb 8 oz)   Percent of Weight Change: -6%     Gestational Age: 39w5d   [unfilled]    Assessment     Breast and nipple assessment: large breast and short nipple, that slightly everts, elastic     Assessment: normal assessment    Feeding assessment:  baby would latch, suckle and become frustrated. On-off, pulling out nipple well, encouraged breast compression during feedings to help with breast milk flow.  LATCH:  Latch: Grasps breast, tongue down, lips flanged, rhythmic sucking   Audible Swallowing: A few with stimulation   Type of Nipple: Everted (After stimulation)   Comfort (Breast/Nipple): Soft/non-tender   Hold (Positioning): Partial assist, teach one side, mother does other, staff holds   LATCH Score: 8          Feeding recommendations:   offer the breasts, both if possible during the feeding, then pump/hand express afterward to protect supply. Provide supplement with expressed colostrum/formula via alternative feeding method. Follow up outpatient for continued support.        Emmy Owens RN 2024 12:26 PM

## 2024-08-01 NOTE — PROGRESS NOTES
"Progress Note - OB/GYN  Zakiya Ozuna 28 y.o. female MRN: [id number]  Unit/Bed#:  303-01 Encounter: [id number]    Assessment and Plan     Zakiya Ozuna is a patient of: [facility]. She is PPD# 1 s/p  spontaneous vaginal delivery  Recovering well and is stable.     Pregnancy affected by fetal growth restriction  Assessment & Plan  3rd percentile with normal Dopplers.     *  (spontaneous vaginal delivery)  Assessment & Plan  Recovering well   Routine postpartum care, encourage ambulation, encourage familial bonding  Lactation support for breast/bottle feeding as needed  Pre-delivery Hgb 11.8   FEN: Tolerating regular diet  Pain: Motrin/Tylenol around the clock, oxycodone if needed  Appropriate bowel and bladder function   Postpartum Contraceptive plan: f/u outpatient  DVT Ppx: Ambulation, SCDs        Disposition    - Anticipate discharge home on PPD# 1-2  Barriers to discharge: None      Xin Alvarado MD  OBGYN PGY-1  2024 6:20 AM    Subjective/Objective     Chief Complaint: Postpartum State     Subjective:    Zakiya Ozuna is PPD/POD#1 s/p  spontaneous vaginal delivery. She has no current complaints and had no acute events overnight.  Pain is well controlled.  Patient is currently voiding.  She is ambulating.  Patient is currently passing flatus and has had bowel movement. She is tolerating PO, and denies nausea or vomitting. Patient denies fever, chills, chest pain, shortness of breath, or calf tenderness. Lochia is minimal. She is  Breastfeeding and Bottle feeding. She is recovering well and is stable.       Vitals:   /69 (BP Location: Left arm)   Pulse 69   Temp 97.9 °F (36.6 °C) (Oral)   Resp 18   Ht 5' 4\" (1.626 m)   Wt 80.3 kg (177 lb)   LMP 10/27/2023 (Exact Date)   SpO2 99%   Breastfeeding Yes   BMI 30.38 kg/m²     No intake or output data in the 24 hours ending 24 0620      Physical Exam:   GEN: Zakiya Ozuna appears well, alert and oriented x 3, pleasant and " cooperative   CARDIO: RRR, intact distals pulses  RESP:  non-labored breathing  ABDOMEN: soft, no tenderness, fundus @ 2 cm below umb  EXTREMITIES: Non tender, no erythema, b/l Lyndsey's sign negative    Labs:   Recent Results (from the past 72 hour(s))   Type and screen    Collection Time: 07/29/24 11:23 PM   Result Value Ref Range    ABO Grouping O     Rh Factor Negative     Antibody Screen Positive     Specimen Expiration Date 20240801    L&D GREEN / YELLOW ON HOLD    Collection Time: 07/29/24 11:23 PM   Result Value Ref Range    Extra Tube Hold Tube Received    CBC    Collection Time: 07/29/24 11:23 PM   Result Value Ref Range    WBC 12.75 (H) 4.31 - 10.16 Thousand/uL    RBC 4.13 3.81 - 5.12 Million/uL    Hemoglobin 11.8 11.5 - 15.4 g/dL    Hematocrit 36.5 34.8 - 46.1 %    MCV 88 82 - 98 fL    MCH 28.6 26.8 - 34.3 pg    MCHC 32.3 31.4 - 37.4 g/dL    RDW 14.2 11.6 - 15.1 %    Platelets 281 149 - 390 Thousands/uL    MPV 10.6 8.9 - 12.7 fL   RPR-Syphilis Screening (Total Syphilis IGG/IGM)    Collection Time: 07/29/24 11:23 PM   Result Value Ref Range    Syphilis Total Antibody Non-reactive Non-Reactive   Antibody identification    Collection Time: 07/29/24 11:23 PM   Result Value Ref Range    ANTIBODY ID. #1 Passive D Antibody, Patient Received RHIG    CORD, Blood gas, arterial    Collection Time: 07/31/24  1:33 AM   Result Value Ref Range    pH, Cord Art 7.327 7.230 - 7.430    pCO2, Cord Art 43.4 30.0 - 60.0    pO2, Cord Art 22.4 5.0 - 25.0 mm HG    HCO3, Cord Art 22.2 17.3 - 27.3 mmol/L    Base Exc, Cord Art -3.8 (L) 3.0 - 11.0 mmol/L    O2 Content, Cord Art 12.3 ml/dl    O2 Hgb, Arterial Cord 50.3 %   CORD, Blood gas, venous    Collection Time: 07/31/24  1:33 AM   Result Value Ref Range    pH, Cord Ger 7.349 7.190 - 7.490    pCO2, Cord Ger 39.3 27.0 - 43.0 mm HG    pO2, Cord Ger 24.4 15.0 - 45.0 mm HG    HCO3, Cord Ger 21.2 12.2 - 28.6 mmol/L    Base Exc, Cord Ger -4.0 (L) 1.0 - 9.0 mmol/L    O2 Cont, Cord Ger  13.3 mL/dL    O2 HGB,VENOUS CORD 53.6 %       Meds:      Current Facility-Administered Medications:     acetaminophen (TYLENOL) tablet 975 mg, 975 mg, Oral, Q6H, Wanda Falk MD, 975 mg at 08/01/24 0442    benzocaine-menthol-lanolin-aloe (DERMOPLAST) 20-0.5 % topical spray 1 Application, 1 Application, Topical, Q6H PRN, Wanda Falk MD, 1 Application at 07/31/24 0252    calcium carbonate (TUMS) chewable tablet 1,000 mg, 1,000 mg, Oral, Daily PRN, Wanda Falk MD    diphenhydrAMINE (BENADRYL) injection 25 mg, 25 mg, Intravenous, Q6H PRN, Wanda Falk MD    hydrocortisone 1 % cream 1 Application, 1 Application, Topical, Daily PRN, Wanda Falk MD, 1 Application at 07/31/24 0252    ibuprofen (MOTRIN) tablet 600 mg, 600 mg, Oral, Q6H BECCA, Wanda Falk MD, 600 mg at 07/31/24 2320    ondansetron (ZOFRAN) injection 4 mg, 4 mg, Intravenous, Q8H PRN, Wanda Falk MD    oxytocin (PITOCIN) 30 Units in lactated ringers 500 mL infusion, 250 nelida-units/min, Intravenous, Once, Wanda Falk MD    Rho(D) immune globulin (RHOGAM ULTRA-FILTERED PLUS) IM injection 300 mcg, 300 mcg, Intramuscular, Once **AND** Postpartum Rhogam, , , Once, Daisy Jackson MD    senna (SENOKOT) tablet 8.6 mg, 1 tablet, Oral, Daily, Wanda Falk MD, 8.6 mg at 07/31/24 0842    witch hazel-glycerin (TUCKS) topical pad 1 Pad, 1 Pad, Topical, Q4H PRN, Wanda Falk MD, 1 Pad at 07/31/24 0252                          25-Jan-2024 19:32

## 2024-08-01 NOTE — LACTATION NOTE
This note was copied from a baby's chart.  Discharge lactation note:    LATCH Documentation   Latch 1   Audible Swallowing 0   Type of Nipple 2   Comfort (Breast/Nipple) 2   Hold (Positioning) 1   LATCH Score 6   Having latch problems? Yes   Position(s) Used Laid Back;Cross Cradle;Football   Breasts/Nipples   Left Breast Soft   Right Breast Soft   Left Nipple Everted   Right Nipple Everted   Intervention Hand expression;Breast pump   Breastfeeding Status Yes   Breastfeeding Progress Not yet established   Other OB Lactation Tools   Feeding Devices Pump;Syringe   Breast Pump   Pump 3;2;1   Patient Follow-Up   Lactation Consult Status 2   Follow-Up Type Inpatient;Call as needed   Other OB Lactation Documentation    Additional Problem Noted Assisted with latch in football, reviewed paced bottle, DC booklet, message to baby and me     Mother reports she was trying to latch infant overnight with no success. Mother and father report feeding 7mL of sim with syringe overnight.     Infant un-swaddled and brought to mother for s2s. Mother and infant assisted into laid-back hold on right breast with infant pushing off of mother. Infant then brought into football hold on left breast. Infant would hold nipple in mouth and perform 2 sucks and un-latch. Infant continues to cry and was brought S2S. Infant would then be moved back into position and begin to cry.     Infant fed 4mL of sim via syringe. Infant burped by family.     Infant brought into football hold on right breast. Infant obtained deep latch and performed good SSB for 5 minutes. Infant remained latched for another 5 minutes and performed non-nutritive suck. Infant then un-latched self with hand open.     Reviewed how to bridge between supplementing and feedings at the breast. Reviewed pumping with feeding at the breast. Reviewed signs that baby is getting enough milk.     Reviewed paced bottle feeding.     Message sent to baby and me for follow up.     Encouraged to  call with next feeding prior to discharge.

## 2024-08-02 ENCOUNTER — LACTATION ENCOUNTER (OUTPATIENT)
Dept: NURSERY | Facility: HOSPITAL | Age: 29
End: 2024-08-02

## 2024-08-02 NOTE — LACTATION NOTE
This note was copied from a baby's chart.  Follow up lactation note:    LATCH Documentation   Latch 0   Audible Swallowing 0   Type of Nipple 2   Comfort (Breast/Nipple) 2   Hold (Positioning) 1   LATCH Score 5   Having latch problems? Yes   Position(s) Used Football;Laid Back;Cross Cradle   Breasts/Nipples   Left Breast Soft   Right Breast Soft   Left Nipple Everted   Right Nipple Everted   Intervention Hand expression;Breast pump   Breastfeeding Status Yes   Breastfeeding Progress Not yet established   Other OB Lactation Tools   Feeding Devices Syringe   Breast Pump   Pump 3;2;1   Patient Follow-Up   Lactation Consult Status 2   Follow-Up Type Inpatient;Call as needed   Other OB Lactation Documentation    Additional Problem Noted Mother called out for latch assess, no latch obtained.     Mother called out for latch support. Mother assisted with latch on left breast in football hold. Infant fusses immediately when moved from S2S. No latch obtained.     Infant fed 4mL of similac via syringe by father and then mother and infant assisted into football hold on right breast. Mother bringing breast to baby and nipple to mouth. Educated on realignment and brought baby back further so infant was aligned nipple to mouth. Infant makes multiple attempts to latch with no latch obtained.     Mother would like to take a break from attempting at the breast and infant was fed another 4mL of similiac.     Reviewed plan to pump every 2-3 and syringe or bottle feed infant with expressed milk first and then formula. Attempt to bring infant to the breast when infant is calm and awake.     Reviewed following up with [facility].

## 2024-08-09 ENCOUNTER — TELEPHONE (OUTPATIENT)
Age: 29
End: 2024-08-09

## 2024-08-19 NOTE — TELEPHONE ENCOUNTER
Scheduled Ms. Ozuna for her Post Partum visit with ELIDA Carmona on Tuesday, August 20, 2024 @11:00am.

## 2024-08-20 ENCOUNTER — POSTPARTUM VISIT (OUTPATIENT)
Dept: OBGYN CLINIC | Facility: CLINIC | Age: 29
End: 2024-08-20

## 2024-08-20 VITALS
HEIGHT: 64 IN | WEIGHT: 165 LBS | SYSTOLIC BLOOD PRESSURE: 122 MMHG | BODY MASS INDEX: 28.17 KG/M2 | DIASTOLIC BLOOD PRESSURE: 80 MMHG

## 2024-08-20 DIAGNOSIS — Z30.011 ENCOUNTER FOR INITIAL PRESCRIPTION OF CONTRACEPTIVE PILLS: ICD-10-CM

## 2024-08-20 PROCEDURE — 99024 POSTOP FOLLOW-UP VISIT: CPT | Performed by: OBSTETRICS & GYNECOLOGY

## 2024-08-20 RX ORDER — ACETAMINOPHEN AND CODEINE PHOSPHATE 120; 12 MG/5ML; MG/5ML
1 SOLUTION ORAL DAILY
Qty: 90 TABLET | Refills: 1 | Status: SHIPPED | OUTPATIENT
Start: 2024-08-20

## 2024-08-20 NOTE — PATIENT INSTRUCTIONS
COLONOSCOPY  Dr. Higgins      Recommendations  · High fiber diet, avoid constipation, Awaiting pathology results due to biopsy(s) performed.,   · Follow up colonoscopy in 5 years, due to history of adenomatous colon polyps and polyps removed today.,   · Start benifiber daily  · no NSAIDs for 10 days    DIET:    Resume your usual diet.      ACTIVITY:    · Rest quietly at home for the remainder of the day.     · Do not do anything that requires coordination the day of the procedure, such as climbing ladders, using a sharp knife or operating machinery.     · Do not drive until tomorrow morning.    · Resume normal medications, unless specified.       WHAT TO EXPECT:    · Mild abdominal discomfort, bloating and gas pains.     · A scant amount of rectal bleeding following a biopsy, polypectomy or if you have hemorrhoids, is normal.    · Return of your usual bowel movements in 1-2 days.    If  you had a polyp removed or biopsy performed, these specimens will be sent to the pathology laboratory.  It takes between 3-5 business days to get results.  You will be contacted with results.     PROBLEMS TO WATCH FOR--NOTIFY YOUR SURGEON IF YOU HAVE ANY OF THESE SYMPTOMS:    1. Severe abdominal pain or bloating.     2. Chills or temperature over 101 degrees.    3. Large amount of bright red rectal bleeding, blood clots or black colored stool.    4. Vomiting blood or black colored liquid.     Anesthesia: Monitored Anesthesia Care (MAC)    You’re due to have surgery. During surgery, you’ll be given medicine called anesthesia. This will keep you comfortable and pain-free. Your surgeon will use monitored anesthesia care (MAC). This sheet tells you more about this type of anesthesia.   What is monitored anesthesia care?  MAC keeps you very drowsy during surgery. You may be awake, but you will likely not remember much. And you won’t feel pain. With MAC, medicines are given through an IV line into a vein in your arm or hand. A local  Patient Education     Vaginal Delivery Discharge Instructions   About this topic   A vaginal birth means that your baby was born through the vagina. How fast you get well will depend on many things. One is if you have had a surgical cut in the area between your vaginal opening and your anus. A cut in this area is an episiotomy. Sometimes you tear naturally during childbirth. This can also affect your healing time. How long your labor was and how much rest you get can also impact your healing. Most of the time, your body takes 6 to 8 weeks to heal from childbirth.     What care is needed at home?   Ask your doctor what you need to do when you go home. Make sure you ask questions if you do not understand what you need to do.  Use a well-fitting bra for support.  Use sanitary pads. Ask your doctor when you may use tampons or douche.  Take a sitz bath 3 to 4 times each day. Sit in 2 to 3 inches (5 to 7.5 cm) of warm water in the tub for 10 to 15 minutes each time. Carefully wipe your bottom afterwards.  Place an ice pack or a bag of frozen peas wrapped in a towel over the painful part. Never put ice right on the skin. Do not leave the ice on more than 10 to 15 minutes at a time.  Take your drugs as ordered.  If breastfeeding, wash your breasts daily with water to keep them clean. Air dry nipples after each feeding.  You may feel sad, weepy, and overwhelmed. Your hormones are changing and you are sleeping less. This can affect your emotions. These feelings are normal. Be patient with yourself. If your sadness lasts more than 2 weeks, contact your doctor. Don’t wait until your postpartum visit.  What follow-up care is needed?   Your doctor may ask you to make visits to the office to check on your progress. Be sure to keep your visits. Ask your doctor if it is okay to be active. Also ask about having sex and birth control.  What drugs may be needed?   Your doctor may give you a drug for pain. Over-the-counter (OTC) pain  anesthetic will usually be injected into the skin and muscle around the surgical site to numb it. The anesthesia provider monitors you during the procedure. He or she checks your heart rate and rhythm, blood pressure, and blood oxygen level.   Anesthesia tools and medicines that may be near you during your procedure  You will likely have:  · A pulse oximeter on the end of your finger. This measures your blood oxygen level.  · Electrocardiography leads (electrodes) on your chest. These record your heart rate and rhythm.  · Medicines given through an IV. These relax you and prevent pain. You may be awake or sleep lightly. If you have local anesthetic, it's injected directly into your skin.  · A face mask to give you oxygen, if needed.  Possible risks  MAC has some risks. These include:  · Breathing problems  · Nausea and vomiting  · Allergic reaction to the anesthetic   Anesthesia safety  Tips for anesthesia safety include the following:   · Follow all instructions you are given for how long not to eat or drink before your procedure.  · Be sure your healthcare provider knows what medicines you take, especially any anti-inflammatory medicine or blood thinners. This includes aspirin and any other over-the-counter medicines, herbs, and supplements.  · Have an adult family member or friend drive you home after the procedure.  · For the first 24 hours after your surgery:  ? Don't drive or use heavy equipment.  ? Don't make important decisions or sign documents.  ? Don't drink alcohol.  ? Have someone stay with you, if possible. They can watch for problems and help keep you safe.     drugs often work just fine. Ask your doctor what to take if you are breastfeeding.  Will physical activity be limited?   You may have to limit your activity. Talk to your doctor about the right amount of activity for you. Ask your doctor when you may safely:  Drive  Have sex  Lift heavy objects  Climb stairs  Work out  Do not lift older children. Let older children climb into your lap.  What problems could happen?   Bleeding  Uterine infection  Tear of tissue around the vagina  Blood clots in your legs  Infection in your breasts  Depression  When do I need to call the doctor?   Signs of infection such as a fever of 100.4°F (38°C) or higher, chills, pain with passing urine, or wound that will not heal.  Sudden shortness of breath or a sudden onset of chest pain could be a sign that a blood clot has traveled to your lungs. Go to the ER right away.  Signs of wound infection such as swelling, redness, warmth around the wound; too much pain when touched; yellowish, greenish, or bloody discharge; foul smell coming from the cut site; cut site opens up.  Problems with pain that does not go away, or gets worse.  Swollen, hard, or painful breasts with a fever of 100.4°F or higher.  Feeling very sad or low mood.  Problems with your urine or bowel movements.  Sudden, large amounts of vaginal bleeding.  Helpful tips   Take a nap when your baby sleeps.  Do something that can help you relax like reading books or listening to music.  Make time for you and your partner to be alone and talk.  Make time for you and your partner to enjoy your baby.  Breastfeeding is good for the baby and for you.  Eat a well-balanced diet and drink plenty of fluids.  Teach Back: Helping You Understand   The Teach Back Method helps you understand the information we are giving you. After you talk with the staff, tell them in your own words what you learned. This helps to make sure the staff has described each thing clearly. It also helps to explain  things that may have been confusing. Before going home, make sure you can do these:  I can tell you about my procedure.  I can tell you what may help ease my pain.  I can tell you what I will do if I have large amounts of vaginal bleeding, swollen or painful breasts with a fever, or feel very sad or have a low mood.  Last Reviewed Date   2020-10-13  Consumer Information Use and Disclaimer   This generalized information is a limited summary of diagnosis, treatment, and/or medication information. It is not meant to be comprehensive and should be used as a tool to help the user understand and/or assess potential diagnostic and treatment options. It does NOT include all information about conditions, treatments, medications, side effects, or risks that may apply to a specific patient. It is not intended to be medical advice or a substitute for the medical advice, diagnosis, or treatment of a health care provider based on the health care provider's examination and assessment of a patient’s specific and unique circumstances. Patients must speak with a health care provider for complete information about their health, medical questions, and treatment options, including any risks or benefits regarding use of medications. This information does not endorse any treatments or medications as safe, effective, or approved for treating a specific patient. [employer] and its affiliates disclaim any warranty or liability relating to this information or the use thereof. The use of this information is governed by the Terms of Use, available at https://www.[employer].com/en/know/clinical-effectiveness-terms   Copyright   Copyright ©  [employer] and its affiliates and/or licensors. All rights reserved.  Patient Education     Normal Bleeding After Having a Baby   About this topic   After you have a baby, even if you have a , you will have some vaginal bleeding and discharge. This is called lochia. Your body needs to  get rid of the extra blood and tissue it made to grow and nourish your baby while you were pregnant.  You may have more bleeding and it may be brighter red for the first couple of days after you have your baby, similar to your period. Some women even pass small clots, but they shouldn’t be bigger than a quarter. You may notice more bleeding if you are very active or when you first stand up and this is normal. You may also notice more bleeding after breastfeeding. When you breastfeed, your womb tightens or contracts.  After about 10 days, the bleeding should slow down. You may have some light bleeding, discharge, or spotting for about 6 weeks. You may notice the color changes to more of a pink or brown and then to yellow, white, or clear.  What care is needed at home?   Ask your doctor what you need to do when you go home. Make sure you ask questions if you do not understand everything the doctor says.  Try to sit or lay down to rest for a little while if you notice extra bleeding, especially right after you have given birth.  Use a sanitary pad while you are bleeding or having discharge. You can change to a panty liner as the discharge gets less.  Talk with your doctor before you use tampons.  What follow-up care is needed?   Your doctor may ask you to come back to the office to check on your progress. Be sure to keep these visits.  What drugs may be needed?   The doctor may order drugs to:  Help with pain  Will physical activity be limited?   Rest for the first few days after you have a baby. Do not lift, push, or pull things over 10 pounds (4.5 kg). Ask your doctor how much exercise is OK.  Talk to your doctor about when it is safe to have sex.  What problems could happen?   Infection  Too much bleeding  When do I need to call the doctor?   Signs of infection. These include a fever of 100.4°F (38°C) or higher, chills, pain with passing urine, abdominal tenderness, or foul smelling vaginal discharge.  Bleeding that  soaks more than 1 pad in an hour or passing blood clots larger than a quarter. These could be signs of too much bleeding from your uterus.  Teach Back: Helping You Understand   The Teach Back Method helps you understand the information we are giving you. After you talk with the staff, tell them in your own words what you learned. This helps to make sure the staff has described each thing clearly. It also helps to explain things that may have been confusing. Before going home, make sure you can do these:  I can tell you about my condition.  I can tell you what I will do if I notice heavier bleeding right after giving birth.  I can tell you what I will do if I soak 1 or more sanitary pads in an hour.  Last Reviewed Date   2020-10-08  Consumer Information Use and Disclaimer   This generalized information is a limited summary of diagnosis, treatment, and/or medication information. It is not meant to be comprehensive and should be used as a tool to help the user understand and/or assess potential diagnostic and treatment options. It does NOT include all information about conditions, treatments, medications, side effects, or risks that may apply to a specific patient. It is not intended to be medical advice or a substitute for the medical advice, diagnosis, or treatment of a health care provider based on the health care provider's examination and assessment of a patient’s specific and unique circumstances. Patients must speak with a health care provider for complete information about their health, medical questions, and treatment options, including any risks or benefits regarding use of medications. This information does not endorse any treatments or medications as safe, effective, or approved for treating a specific patient. [employer] and its affiliates disclaim any warranty or liability relating to this information or the use thereof. The use of this information is governed by the Terms of Use, available at  https://www.[employer].com/en/know/clinical-effectiveness-terms   Copyright   Copyright © 2024 [employer] and its affiliates and/or licensors. All rights reserved.  Patient Education     Taking Care of Yourself After You Have a Baby   About this topic   It is important to take care of yourself after you have a baby. This will help you cope with the stress of having a new baby. Parents who are well rested often feel like they are better able to care for their baby.  General   New moms, and dads too, often get less sleep after the baby is born and the sleep they do get is often interrupted. When you do not get enough good sleep, you are more likely to:  Be irritable  Have problems thinking and concentrating  Become depressed  Be less alert for tasks like driving  Forget important things like appointments  It is also important for new parents to take a few minutes each day to take care of themselves. It can be hard to find a few minutes to put yourself first. When you do, you will find that you are better able to take care of your baby and family.  What lifestyle changes are needed?   Ask for help. When others offer to help, say yes. Use that time to take a nap or to do something nice for yourself. Take a long shower. Paint your nails. Go for a walk. Have a cup of coffee with a friend.  Remember it is healthy to take time for yourself. When you allow other trusted friends or relatives to watch your baby, they get a chance to bond with your baby. You get a chance to get away for a short time and focus on other things.  Most often, after a little time away from your baby, you will come back refreshed and recharged.  Take time to connect with your partner as well. Being a parent is hard, but it is easier if your partner is involved.  Make sleep a priority. Take a nap when the baby sleeps. The dishes or housework can wait. Sleep is important to your overall health and to your mental health as well.  Work out a  schedule with a partner or family member. Try to share nighttime care.  If you are having trouble sleeping at night, try these tips to help you sleep:  Spend some time in bright light or sunlight during the day. Avoid bright lights and TV, phone, and computer screens at night.  Avoid hard exercise, coffee, and heavy meals for a few hours before bedtime.  Keep your bedroom dark, cool, and quiet.  If you cannot fall asleep, get up and do a light activity until you feel sleepy. Try to read, write in a journal, or take deep and relaxing breaths.  When do I need to call the doctor?   If you have any thoughts of hurting yourself or someone else  If you have depression and it gets worse or is not getting better  If your problems with sleeping continue  If you are not able to do your normal tasks  Last Reviewed Date   2020-01-14  Consumer Information Use and Disclaimer   This generalized information is a limited summary of diagnosis, treatment, and/or medication information. It is not meant to be comprehensive and should be used as a tool to help the user understand and/or assess potential diagnostic and treatment options. It does NOT include all information about conditions, treatments, medications, side effects, or risks that may apply to a specific patient. It is not intended to be medical advice or a substitute for the medical advice, diagnosis, or treatment of a health care provider based on the health care provider's examination and assessment of a patient’s specific and unique circumstances. Patients must speak with a health care provider for complete information about their health, medical questions, and treatment options, including any risks or benefits regarding use of medications. This information does not endorse any treatments or medications as safe, effective, or approved for treating a specific patient. [employer], [employer] and its affiliates disclaim any warranty or liability relating to this information or the  "use thereof. The use of this information is governed by the Terms of Use, available at https://www.wolterskluwer.com/en/know/clinical-effectiveness-terms   Copyright   Copyright ©  [employer], [employer]. and its affiliates and/or licensors. All rights reserved.  Patient Education     Caring for your    The Basics   Written by the doctors and editors at [facility]   How will I know how to care for my ? -- \"[place]\" is what a baby is called for the first 4 weeks of life. In the hospital, the doctors and nurses will help you learn how to care for your . They will answer your questions and make sure that you know what to do when you go home. Some hospitals offer a class on  care.  This article has general tips for caring for a healthy . Babies that were born premature, or \",\" often need other special care.  How does a healthy  act? -- In the days and weeks after birth, your baby will probably:   Keep their body curled up, the way they were inside of the uterus   Sleep a lot   Need to be fed at least every few hours  What should I know about caring for my ? -- People make different choices about how to care for their baby. But there are some things that everyone should do for safety reasons. These include:   Handling the baby - When picking up or holding your , support their body, especially their head and neck. Be gentle, and never shake a baby. When you put your baby down, make sure that they are in a safe place such as a crib, cradle, or bassinet.   Sleep - Always put your baby on their back on a flat surface to sleep. They should sleep in a crib, cradle, or bassinet without any pillows, blankets, or other objects in it (figure 1). The mattress should be firm, not soft. If you want your baby to sleep near you, put the crib or bassinet near your bed (figure 2).   Temperature - Dress your  in clothing that will keep them from getting too hot or too cold. If " "their hands or feet feel cold, cover them with mittens or socks.   Travel - If you have a car, make sure that you have an infant car seat that has not  and is installed correctly. It's also important to make sure that the car seat straps are at the right height and that your baby is securely buckled in.  If you need to travel anywhere with your , bring supplies with you so that you are prepared. This includes diapers, wipes, extra clothes, and formula if you use it.   Preventing the spread of germs - Anyone who holds or touches your  should wash their hands first. This will help protect them from infections while their immune system is still developing.  You will also need to learn the basics of:   Feeding - Feed your  when they show signs of being hungry. Signs include waking up from sleep, moving their head around, or sucking on their hands, lips, or tongue. Most newborns need to eat about 8 to 12 times a day. Burp your  gently after each feed.   Diapering - Check your 's diaper often, and change it when it is wet or dirty. This will help prevent diaper rash. When you change your baby:   Wash your hands before and after.   Always lay them on a flat, stable surface.   Never leave the baby alone.   Use baby wipes or a wet cloth to gently clean their skin.   Use diaper cream or ointment if their skin is irritated.   Make sure that the diaper is the right size and is not too tight.  If your  was circumcised, follow all instructions on how to care for the area as it heals.   Caring for the umbilical cord - There will be a \"stump\" where the umbilical cord was cut. It will dry up and fall off on its own, usually within a week or 2 after birth.  While the stump is still attached, keep it clean and dry. It can help to fold the front of the diaper down so it does not cover the stump. Do not pull on the stump. Do not put anything on it, like rubbing alcohol or ointment.   " "Bathing - Newborns do not need to be bathed every day. You can give sponge baths until the umbilical cord stump falls off. For a sponge bath, keep your baby covered with a towel to stay warm. Uncover 1 part of their body at a time, and use a washcloth and warm (not hot) water to clean them.  If possible, have another caregiver help you when you bathe your . Never leave a baby alone in or near water.   Soothing and comforting - When your  cries, they might be hungry or need a diaper change. But it's also normal for babies to cry for no obvious reason. To soothe your baby, you can try:   Holding or rocking them   Putting them in a baby carrier or wrap that you wear   Swaddling them (figure 3)   Making a \"shushing\" sound or using a white noise machine   Putting them in a car seat and going for a drive  How do I care for myself? -- Taking care of a  is a lot of work. It's normal to be tired during this time. You can take care of yourself by:   Resting and sleeping when you can   Eating healthy foods and drinking plenty of water   Having other people help when possible  When should I call the doctor? -- Call for advice right away if your baby:   Is not eating normally   Is unusually sleepy or hard to wake   Has severe or worsening jaundice (when the skin or white part of the eye turns yellow)   Seems to be working harder than normal to breathe   Turns blue in the face, skin, lips, fingernails, or toenails   Has a fever of 100.4°F (38°C) or higher   Does not have a wet diaper for 8 hours or longer   Spits up a lot   Has blood in their diaper   Cries for longer than 2 hours without stopping   Has redness or oozing around the umbilical cord stump  Call the doctor if your baby's umbilical cord stump does not fall off after 3 weeks.  You should also call for help if you are struggling to take care of or feed your baby.  All topics are updated as new evidence becomes available and our peer review process " "is complete.  This topic retrieved from [employer] on: May 15, 2024.  Topic 802314 Version 7.0  Release: 32.4.3 - C32.134  © 2024 UpToDate, Inc. and/or its affiliates. All rights reserved.  figure 1: Putting your baby to sleep safely     Always put your babyon their back on a flat surface to sleep. The crib or bassinet should nothave any pillows, blankets, or other objects in it. The mattress should befirm, not soft.  Graphic 917106 Version 1.0  figure 2: Safe room-sharing     If you want your babyto sleep near you, put their crib or bassinet next to your bed. Do notput the baby in bed with you. Always put your baby on their back to sleep.  Graphic 121919 Version 1.0  figure 3: Steps to safely swaddle a baby     Swaddling a baby can help stop crying or fussing. To swaddle a baby:  Put the baby on a large blanket that has the top corner folded down (A).  Bring the left arm down (B). Wrap the cloth over the arm and chest, and tuck it under the right side of the baby (C).  Bring the right arm down. Wrap the cloth over the baby's arm and chest, and tuck it under the left side of the baby (D).  Twist or fold the bottom end of the cloth, and tuck it behind the baby (E, F). Make sure both legs are bent up and out, so the hips can move.  For safety:  The baby's neck and head should be completely uncovered.  Tuck the blanket snugly enough that it stays in place while your baby is sleeping. There should be no loose blankets or bedding in the crib, since this increases the risk of suffocation or SIDS (also called \"crib death\").  Make sure that there is room for the baby to bend their hips or knees. When babies are swaddled too tightly, it can cause problems in the hip joint.  Always put your baby to sleep on their back.   Do not place a loose blanket or anything else on top of your baby after swaddling.  Do not swaddle your baby once they start trying to roll over.  Graphic 10317 Version 9.0  Consumer Information Use and " "Disclaimer   Disclaimer: This generalized information is a limited summary of diagnosis, treatment, and/or medication information. It is not meant to be comprehensive and should be used as a tool to help the user understand and/or assess potential diagnostic and treatment options. It does NOT include all information about conditions, treatments, medications, side effects, or risks that may apply to a specific patient. It is not intended to be medical advice or a substitute for the medical advice, diagnosis, or treatment of a health care provider based on the health care provider's examination and assessment of a patient's specific and unique circumstances. Patients must speak with a health care provider for complete information about their health, medical questions, and treatment options, including any risks or benefits regarding use of medications. This information does not endorse any treatments or medications as safe, effective, or approved for treating a specific patient. UpToDate, Inc. and its affiliates disclaim any warranty or liability relating to this information or the use thereof.The use of this information is governed by the Terms of Use, available at https://www.[employer].com/en/know/clinical-effectiveness-terms. © UpToDate, Inc. and its affiliates and/or licensors. All rights reserved.  Copyright   ©  UpToDate, Inc. and/or its affiliates. All rights reserved.  Patient Education     Depression during and after pregnancy   The Basics   Written by the doctors and editors at [facility]   What is depression? -- This is a disorder that makes you sad, but it is different than normal sadness.  For many people, pregnancy is a happy time. But some people have depression while they are pregnant or after they have their baby. Doctors use different terms for this:    depression - This is depression during pregnancy. \"\" means the period of time before a baby is born.   Postpartum " "depression - This is a kind of depression that some people get after having a baby. \"Postpartum\" means the period of time shortly after giving birth.  Depression can make it hard to eat, sleep, work, or enjoy life. It can also make it hard to care for yourself and your baby or other children.  What causes depression? -- It is caused by problems with chemicals in the brain called \"neurotransmitters.\" Some people might be more likely to have depression if it runs in their family. Other things might also play a role, including hormones, certain health problems, medicines, stress, being mistreated as a child, family problems, and problems with friends or at work.  It's not exactly clear what causes some people to have depression during or after pregnancy. It is more likely in people who had depression in the past.  Get help right away if you are thinking of hurting or killing yourself! -- Sometimes, people with depression think of hurting or killing themselves. If you ever feel like you might hurt yourself or your baby, help is available:   In the , contact the [employer] Suicide & Crisis Lifeline:   To speak to someone, call or text [employer].   To talk to someone online, go to [email or web address].org/chat.   Call your doctor or nurse, and tell them that it is an emergency.   Call for an ambulance (in the [place] and [place], call ).   Go to the emergency department at your local hospital.  If you think that your partner might have depression, or if you are worried that they might hurt themselves, get them help right away.  What are the symptoms of  depression? -- With  depression, symptoms start during pregnancy.  Depression can make you feel sad, down, hopeless, or cranky most of the day, almost every day. Another common sign of depression is no longer enjoying or caring about things that you used to like. Other symptoms might include trouble sleeping, not having the energy for normal daily tasks, not gaining " "enough weight, or feeling restless, worthless, or guilty.  What are the symptoms of postpartum depression? -- It can be hard to tell if someone has postpartum depression, since some of the symptoms might also be caused by the stress of taking care of a . For example, after having a new baby, it's normal to:   Sleep too much or too little   Feel tired or lack energy   Have changes in your appetite, weight, or desire to have sex  But a person with postpartum depression might not be able to sleep even when their baby sleeps. Or they might have so little energy that they cannot get out of bed for hours.  They might also feel:   Anxious, irritable, and angry   Guilty or overwhelmed   Unable to care for their baby   Like a failure as a parent  What are \"postpartum blues\"? -- After having a baby, many people get a mild type of postpartum depression called \"postpartum blues.\" Within 2 or 3 days after giving birth, people with postpartum blues might:   Be streeter, irritable, or anxious   Have trouble concentrating or sleeping   Have crying spells  With postpartum blues, these symptoms are not severe and usually go away within 2 weeks. But in people with postpartum depression, the symptoms are more severe. They last at least 2 weeks, and often longer.  How is depression treated? -- The 2 main treatments are:   Medicines - Medicines called \"antidepressants\" are often used to treat depression.  A newer medicine called zuranolone (brand name: Zurzuvae) might also be an option for treating postpartum depression in some situations. It comes as a pill you take every day for 2 weeks. It is not used during pregnancy.   Psychotherapy (counseling) - This involves meeting with a therapist to talk about your feelings, thoughts, and behavior. There are different types of psychotherapy. For example, one type involves working on improving your relationships. Another type involves changing your thinking and behavior patterns to help you " cope.  The options for treatment depend on how severe your symptoms are and whether you had depression before pregnancy. If you need treatment with medicine, your doctor will help you decide what to do based on your situation:   If you are pregnant, your doctor will talk to you about your options. Many people take antidepressant medicines during pregnancy.   If you are breastfeeding, you might need to avoid certain medicines. That's because small amounts of medicine can get into your breast milk. For some medicines, this can be unhealthy for your baby. But not treating depression can also be harmful for both you and your baby, and there are many medicines for depression that do not seem to harm babies. Your doctor can help you decide if you need medicine and the best one to take.  Take care of yourself by getting plenty of rest and finding healthy ways to cope with stress. Accept help from other people when possible. It can also help to move your body. Even gentle forms of exercise, like walking, are good for your health.  Is there any way to prevent depression during or after pregnancy? -- Maybe. If you have had depression before, you are more likely to get it again during or after pregnancy. If you are at risk for depression, your doctor might recommend talking to a therapist.  If you had postpartum depression before and took a medicine that helped, your doctor might prescribe that medicine to take again after the next time you give birth.  All topics are updated as new evidence becomes available and our peer review process is complete.  This topic retrieved from [employer] on: Apr 03, 2024.  Topic 78206 Version 18.0  Release: 32.2.4 - C32.92  © 2024 UpToDate, Inc. and/or its affiliates. All rights reserved.  Consumer Information Use and Disclaimer   Disclaimer: This generalized information is a limited summary of diagnosis, treatment, and/or medication information. It is not meant to be comprehensive and should be  used as a tool to help the user understand and/or assess potential diagnostic and treatment options. It does NOT include all information about conditions, treatments, medications, side effects, or risks that may apply to a specific patient. It is not intended to be medical advice or a substitute for the medical advice, diagnosis, or treatment of a health care provider based on the health care provider's examination and assessment of a patient's specific and unique circumstances. Patients must speak with a health care provider for complete information about their health, medical questions, and treatment options, including any risks or benefits regarding use of medications. This information does not endorse any treatments or medications as safe, effective, or approved for treating a specific patient. UpToDate, Inc. and its affiliates disclaim any warranty or liability relating to this information or the use thereof.The use of this information is governed by the Terms of Use, available at https://www.[employer]/en/know/clinical-effectiveness-terms. 2024© UpToDate, Inc. and its affiliates and/or licensors. All rights reserved.  Copyright   © 2024 UpToDate, Inc. and/or its affiliates. All rights reserved.  Patient Education     Pelvic Floor Exercises   About this topic   The pelvic floor consists of muscles and strong bands of tissues which support all of the organs in your pelvis. Some of these organs are the bladder and the small and large bowel as well as the womb and the prostate. If the muscles and tissues get weak, your organs may drop. This can lead to other problems. Your urine may leak when you laugh, sneeze, or cough. You may not be able to drain the bladder fully. You may have less problems if you do exercises to strengthen your pelvic floor and abdominal muscles.  Kegel exercises help make the muscles in the pelvic floor stronger. Anyone can do them. It is also important to make your abdominal muscles  stronger. In order for these exercises to work, you must be consistent when doing them.  General   Before starting with a program, ask your doctor if you are healthy enough to do these exercises. Your doctor may have you work with a  or physical therapist to make a safe exercise program to meet your needs.  Strengthening Exercises   Kegel exercises keep your pelvic muscles firm and strong. You can do these in many different positions. Start by lying down with your knees bent and feet on the bed. Squeeze the pelvic muscles as if you are trying to stop the flow of urine. Hold these muscles for a count of 3, and then slowly relax them for a count of 3. Try to work up to squeezing for a count of 10 and slowly relaxing for a count of 10. Increase the muscle squeeze until you get to 10. When relaxing, slowly relax to a count of 10.  Breathe out when you are squeezing and breathe in when you are relaxing. Your goal is to try to do 10 Kegels 3 or more times each day. Take time to rest between sets. Be sure to only contract your pelvic floor muscles, not your buttocks, thighs, or abdominal muscles.  Pelvic floor contractions ? There are a few ways to feel the pelvic muscles contract.  When you are passing urine, try suddenly stopping your flow of urine. Do not do this on a regular basis, but only to feel what the contraction feels like. Doing this while passing urine can lead to other problems.  Put a finger into the vagina or rectum. Contract the muscles around your finger as if you were trying to stop the flow of urine or stop the passing of gas.  Place two chairs without arms about 2 inches (5 cm) apart. Sit so you have one butt cheek on each chair. Now, try kathy your pelvic floor muscles. This will help you keep from using other muscles.  Pelvic tilts ? Lie on your back with your knees bent and feet flat on the floor. Tighten your stomach muscles and press your lower back down to the floor. Try doing Kegels  with this exercise when your back is flattened. Hold 3 to 5 seconds. Relax.  Straight leg raises lying down ? Lie on your back with one leg straight. Bend your other knee so the foot is flat on the bed. Keeping your leg straight, lift the leg up to the level of your other knee. Lower it back down. Repeat with the other leg.  Hip lifts ? Lie on your back with your knees bent and feet flat on the floor. Tighten your stomach muscles and lift your buttocks off the floor. Try doing Kegels when up in this position. Hold 3 to 5 seconds. Relax.  Abdominal bracing ? Do this exercise in different positions: Lying down, sitting, and standing. Tighten your stomach muscles. While keeping the stomach muscles tight, tighten your pelvic floor muscles. Now, forcefully laugh or cough to see if you were able to prevent urine from leaking.  Abdominal crunches ? Lie on your back with both knees bent. Keep your feet flat on the floor. Place your hands in one of these positions. Try starting with the first position since it is the easiest. As you get better, use the other positions to make it harder.  Crunches with arms at sides.  Crunches with arms across chest.  Crunches with arms behind head. Be careful not to interlock your fingers behind your neck or head while doing crunches. This may add tension to your neck and cause strain.  Look at the ceiling. Tighten your belly muscles and lift your shoulders and upper back off the floor. Breathe out while you are doing this. Lower your shoulders to the floor. Breathe in while you are doing this. Relax your belly muscles all the way before starting another crunch.             What will the results be?   Less leakage of urine when you cough, sneeze, laugh, or run  Fewer strong urges to pass urine  Fewer trips to the bathroom each day  Less risk of organs, such as the uterus or bladder, dropping into the vagina  Faster recovery after childbirth or prostate surgery  Stronger core  muscles  Increased sensitivity during sex  Helpful tips   You can also try doing a different kind of Kegels. Do 5 quick, strong pelvic floor contractions. Sometimes, if you have an urge to pass urine but are not near a bathroom, you can do this kind of Kegel to calm the urge.  Stay active and work out to keep your muscles strong and flexible.  Keep a healthy weight to avoid putting too much stress on your spine. Eat a healthy diet to keep your muscles healthy.  Be sure you do not hold your breath when exercising. This can raise your blood pressure. If you tend to hold your breath, try counting out loud when exercising. If any exercise bothers you, stop right away.  Try walking or cycling at an easy pace for a few minutes to warm up your muscles. Do this again after exercising.  Doing exercises before a meal may be a good way to get into a routine. A good time to do these exercises is each time you are stopped at a stop light while driving.  Exercise may be slightly uncomfortable, but you should not have sharp pains. If you do get sharp pains, stop what you are doing. If the sharp pains continue, call your doctor.  Last Reviewed Date   2021-03-31  Consumer Information Use and Disclaimer   This generalized information is a limited summary of diagnosis, treatment, and/or medication information. It is not meant to be comprehensive and should be used as a tool to help the user understand and/or assess potential diagnostic and treatment options. It does NOT include all information about conditions, treatments, medications, side effects, or risks that may apply to a specific patient. It is not intended to be medical advice or a substitute for the medical advice, diagnosis, or treatment of a health care provider based on the health care provider's examination and assessment of a patient’s specific and unique circumstances. Patients must speak with a health care provider for complete information about their health, medical  questions, and treatment options, including any risks or benefits regarding use of medications. This information does not endorse any treatments or medications as safe, effective, or approved for treating a specific patient. UpToDate, Inc. and its affiliates disclaim any warranty or liability relating to this information or the use thereof. The use of this information is governed by the Terms of Use, available at https://www.[employer]/en/know/clinical-effectiveness-terms   Copyright   Copyright © 2024 UpToDate, Inc. and its affiliates and/or licensors. All rights reserved.  Birth Control Pills     Birth control pills  are also called oral contraceptives, or the pill. It is medicine that helps prevent pregnancy by stopping ovulation. Ovulation is when the ovaries make and release an egg cell each month. If this egg gets fertilized by sperm, pregnancy occurs. You will need to take the pill at the same time every day. Your healthcare provider will tell you when to start taking the pill. You will also be told what to do if you miss a dose. Instructions will depend on the kind of birth control pills you are taking.   Different kinds of birth control pills:  Some kinds are taken for 21 days in a row, followed by 7 days of placebo (no hormones) pills. Other kinds are taken for 24 days followed by 4 days of placebos. Each kind has a certain amount of female hormones. Your provider will decide on the kind that is best for you based on your age and other health conditions.  Call your local emergency number (911 in the [place]) for any of the following:   You have any of the following signs of a stroke:      Numbness or drooping on one side of your face     Weakness in an arm or leg    Confusion or difficulty speaking    Dizziness, a severe headache, or vision loss    You feel lightheaded, short of breath, and have chest pain.    You cough up blood.    Seek care immediately if:   Your arm or leg feels warm, tender, and  painful. It may look swollen and red.    You have severe pain, numbness, or swelling in your arms or legs.    Contact your healthcare provider if:   You have forgotten to take a birth control pill.    You have mood changes, such as depression, since starting birth control pills.    You have nausea or are vomiting.    You have severe abdominal pain.    You missed a period and have questions or concerns about being pregnant.    You still have bleeding 4 months after taking birth control pills correctly.    You have questions or concerns about your condition or care.    What may be done before you can start taking birth control pills:  You need to see your healthcare provider to get a prescription. Any of the following may be done before your healthcare provider gives you a prescription:  Your healthcare provider will ask about diseases and illnesses you have had in the past. Your provider will check your risk for blood clots, heart conditions, or stroke. Tell your provider if you had gastric bypass surgery. This surgery can affect the way your body absorbs medicines such as birth control pills.    Your provider will also check your blood pressure, and may do a breast and pelvic exam. A Pap smear may also be done during the pelvic exam. This is a test to make sure you do not have abnormal changes on your cervix. You may need other tests, such as a urine test to make sure you are not pregnant.    Your provider will ask if you take any medicines and if you smoke. Smoking increases your risk for stroke, heart attack, or a blood clot in your lungs. If you smoke, you should not take certain kinds of birth control pills.    Advantages of birth control pills:  When birth control pills are used correctly, the chances of getting pregnant are very low. Birth control pills may help decrease bleeding and pain during your monthly period. They may also help prevent cancer of the uterus and ovaries.  Disadvantages of birth control  pills:  You may have sudden changes in your mood or feelings while you take birth control pills. You may have nausea and a decreased sex drive. You may have an increased appetite and rapid weight gain. You may also have bleeding in between periods, less frequent periods, vaginal dryness, and breast pain. Birth control pills will not protect you from sexually transmitted infections. Rarely, some birth control pills can increase your risk for a blood clot. This may become life-threatening.  If you decide you want to get pregnant:  If you are planning to have a baby, ask your healthcare provider when you may stop taking your birth control pills. It may take some time for you to start ovulating again. Ask your healthcare provider for more information about pregnancy after birth control pills.  When to start taking birth control pills after you have a baby:  If you are not breastfeeding, you may start taking birth control pills 3 weeks after you give birth. You may be able to take certain types of birth control pills if you are breastfeeding. These pills can be started from 6 weeks to 6 months after you give birth. Ask your healthcare provider for more information about when to start taking birth control pills after you give birth.  What you need to know about birth control pills and menopause:   Talk with your healthcare provider if you want to take birth control pills around menopause.     Around age 45, you will enter into perimenopause. This means your hormone levels are dropping and you are ovulating less often. You can still become pregnant during this time. The risk for problems, such as miscarriage, are higher if you become pregnant after age 45. Birth control pills will prevent pregnancy, and may also help prevent or relieve some signs and symptoms of menopause. Examples are hot flashes and mood swings.     Your provider will do tests when you are around age 50. The tests may show that you are in menopause. If  the tests do not show menopause for sure, you may be able to continue taking the pill up to age 55. The decision will depend on your health and if you have any medical conditions, such as a blood clot.    Do not smoke:  Nicotine and other chemicals in cigarettes and cigars increase your risk for a blood clot while you use birth control pills. The risk is higher if you are also 35 or older. Ask your healthcare provider for information if you currently smoke and need help to quit. E-cigarettes or smokeless tobacco still contain nicotine. Talk to your healthcare provider before you use these products.  Follow up with your healthcare provider as directed:  Write down your questions so you remember to ask them during your visits.  © Copyright [employer] 2020 Information is for End User's use only and may not be sold, redistributed or otherwise used for commercial purposes. All illustrations and images included in CareNotes® are the copyrighted property of [employer] or [employer]  The above information is an  only. It is not intended as medical advice for individual conditions or treatments. Talk to your doctor, nurse or pharmacist before following any medical regimen to see if it is safe and effective for you.     Patient Education     Constipation in adults   The Basics   Written by the doctors and editors at [facility]   What is constipation? -- Constipation is a common problem that makes it hard to have bowel movements. Your bowel movements might be:   Too hard   Too small   Hard to get out   Happening fewer than 3 times a week  What causes constipation? -- Constipation can be caused by:   Side effects of some medicines   Poor diet   Diseases of the digestive system (figure 1)  What other symptoms should I watch for? -- These symptoms could signal a more serious problem:   Blood in the toilet or on the toilet paper after having a bowel movement   Fever   Weight loss   Feeling weak  It  "could also be a sign of a problem if you have new constipation without a change in your medicines or diet, and have never had constipation in the past.  Is there anything I can do on my own to get rid of constipation? -- Yes. Try these steps:   Eat foods that have a lot of fiber. Good choices are fruits, vegetables, prune juice, and cereal (table 1).   Drink plenty of water and other fluids.   When you feel the need to go to the bathroom, go to the bathroom. Don't hold it.   Take laxatives. These are medicines that help make bowel movements easier to get out. Some are pills that you swallow. Other medicines go into the rectum. These are called \"suppositories\" or \"enemas.\"  Should I see a doctor or nurse? -- See your doctor or nurse if:   Your symptoms are new or not normal for you.   You do not have a bowel movement for a few days.   The problem comes and goes, but lasts for longer than 3 weeks.   You are in a lot of pain.   You have other symptoms that also worry you (for example, bleeding, weakness, weight loss, or fever).   Other people in your family have had colorectal cancer or inflammatory bowel disease.  Should I have any tests? -- Your doctor or nurse will decide which tests you should have based on your age, other symptoms, and individual situation. There are lots of tests, but you might not need any.  Here are the most common tests doctors use to find the cause of constipation:   Rectal exam - Your doctor will look at the outside of your anus. They will also use a finger to feel inside the opening.   Sigmoidoscopy or colonoscopy - For these tests, the doctor puts a thin tube into your anus. Then, they advance the tube into your large intestine. The large intestine is also called the colon. The tube has a camera attached to it, so the doctor can look inside your intestines. During these tests, the doctor can also take samples of tissue to look at under a microscope (figure 2).   X-rays, CT scan, or MRI - " "These create images of the inside of your body.   Manometry studies - Manometry lets the doctor measure the pressure inside of the rectum at various points. It can help the doctor find out if the muscles that control bowel movements are working right. The test also shows whether the person's rectum can feel normally.  How is constipation treated? -- It depends on what is causing your constipation. First, your doctor will ask you to try eating more fiber and drinking more water. If that doesn't help, your doctor might suggest:   Medicines that you swallow or put in your rectum   Changing the medicines you are taking for other conditions   A treatment called an \"enema\" - Enemas can be just water, or they can contain medicine to help with constipation.   Biofeedback - This is a technique that teaches you to relax your muscles so you can let go and push bowel movements out.  Can constipation be prevented? -- You can reduce your chances of getting constipation again if you:   Eat a high-fiber diet (table 1).   Drink water and other fluids during the day,   Go to the bathroom at regular times every day.  All topics are updated as new evidence becomes available and our peer review process is complete.  This topic retrieved from [employer] on: Feb 26, 2024.  Topic 04169 Version 11.0  Release: 32.2.4 - C32.56  © 2024 UpToDate, Inc. and/or its affiliates. All rights reserved.  figure 1: Digestive system     This drawing shows the organs in the body that process food. Together, these organs are called the \"digestive system\" or \"digestive tract.\" As food travels through this system, the body absorbs nutrients and water.  Graphic 74313 Version 5.0  table 1: Amount of fiber in different foods  Food  Serving  Grams of fiber    Fruits    Apple (with skin) 1 medium apple 4.4   Banana 1 medium banana 3.1   Oranges 1 orange 3.1   Prunes 1 cup, pitted 12.4   Juices    Apple, unsweetened, with added ascorbic acid 1 cup 0.5   Grapefruit, " white, canned, sweetened 1 cup 0.2   Grape, unsweetened, with added ascorbic acid 1 cup 0.5   Orange 1 cup 0.7   Vegetables    Cooked   Green beans 1 cup 4.0   Carrots 1/2 cup sliced 2.3   Peas 1 cup 8.8   Potato (baked, with skin) 1 medium potato 3.8   Raw   Cucumber (with peel) 1 cucumber 1.5   Lettuce 1 cup shredded 0.5   Tomato 1 medium tomato 1.5   Spinach 1 cup 0.7   Legumes   Baked beans, canned, no salt added 1 cup 13.9   Kidney beans, canned 1 cup 13.6   Lima beans, canned 1 cup 11.6   Lentils, boiled 1 cup 15.6   Breads, pastas, flours    Bran muffins 1 medium muffin 5.2   Oatmeal, cooked 1 cup 4.0   White bread 1 slice 0.6   Whole-wheat bread 1 slice 1.9   Pasta and rice, cooked   Macaroni 1 cup 2.5   Rice, brown 1 cup 3.5   Rice, white 1 cup 0.6   Spaghetti (regular) 1 cup 2.5   Nuts    Almonds 1/2 cup 8.7   Peanuts 1/2 cup 7.9   To learn how much fiber and other nutrients are in different foods, visit the [place] [employer] ([employer]) [employer] website.  Graphic 19784 Version 6.0  figure 2: Colonoscopy     During a colonoscopy, you lie on your side and the doctor puts a thin tube with a camera into your anus (from behind). Then, the doctor advances the tube into the rectum and colon. The camera sends pictures from inside your colon to a television screen.  Graphic 51520 Version 8.0  Consumer Information Use and Disclaimer   Disclaimer: This generalized information is a limited summary of diagnosis, treatment, and/or medication information. It is not meant to be comprehensive and should be used as a tool to help the user understand and/or assess potential diagnostic and treatment options. It does NOT include all information about conditions, treatments, medications, side effects, or risks that may apply to a specific patient. It is not intended to be medical advice or a substitute for the medical advice, diagnosis, or treatment of a health care provider based on the health care  provider's examination and assessment of a patient's specific and unique circumstances. Patients must speak with a health care provider for complete information about their health, medical questions, and treatment options, including any risks or benefits regarding use of medications. This information does not endorse any treatments or medications as safe, effective, or approved for treating a specific patient. UpToDate, Inc. and its affiliates disclaim any warranty or liability relating to this information or the use thereof.The use of this information is governed by the Terms of Use, available at https://www.wolEndoclearuwer.com/en/know/clinical-effectiveness-terms. 2024© UpToDate, Inc. and its affiliates and/or licensors. All rights reserved.  Copyright   © 2024 UpToDate, Inc. and/or its affiliates. All rights reserved.

## 2024-08-20 NOTE — PROGRESS NOTES
Diagnoses and all orders for this visit:    Postpartum exam    Encounter for initial prescription of contraceptive pills  -     norethindrone (MICRONOR) 0.35 MG tablet; Take 1 tablet (0.35 mg total) by mouth daily    Other orders  -     MISC NATURAL PRODUCTS PO; Take by mouth Placenta capsule    May advance activities as tolerated  May resume sexual activity at your discretion   Perineal hygiene reviewed   Weight bearing exercises minium of 150 mins/weekly advised.   Kegel exercises recommended.   Reviewed  rest, daily exercise and nutrition recommendations. Continue with PNV.  Gardisil vaccines recommended up to age 45  Advised to call with any issues,  all concerns & questions addressed.     Follow up for annual exam in 3 months   See AVS for further educational information    Zakiya Ozuna is a 28 y.o.  presents for routine postpartum visit.   She is s/p vaginal  @ 39/5 on 2024 . 2nd degree laceration  perineal lac, Baby Girl , Apgar's 9/1--9/5  Prenatal and intrapartum course was complicated by     Patient Active Problem List   Diagnosis     (spontaneous vaginal delivery)    Rh negative state in antepartum period    Pregnancy affected by fetal growth restriction     She denies abn bleeding, pelvic pain, breast complaints, bowel/bladder dysfunction, depression/anx. Denies resumption of sexual activity  Baby is thriving. Breast  feeding. EPDS score 2 Good support.     Normal postpartum exam. Lochia Alba noted/scant. No CMT or S&S of infection   She expresses interest in starting POP's for contraception. Reviewed risks/benefits, SE's/AE's and directions for use. Recommended starting this today. Advised of importance of compliance, discussed reasons to use back up and reviewed reasons to call.   Discussed risks of short interval pregnancies.     Gardasil: Not completed,  Gardasil discussed pt aware of benefits/ risks.  Aware she can start the series today or call for appt anytime     Past Medical  "History:   Diagnosis Date    Varicella      Past Surgical History:   Procedure Laterality Date    APPENDECTOMY         Immunization History   Administered Date(s) Administered    Rho (D) Immune Globulin 05/10/2024    Tuberculin Skin Test-PPD Intradermal 2022       Family History   Problem Relation Age of Onset    No Known Problems Mother     No Known Problems Father     No Known Problems Brother     No Known Problems Maternal Grandmother     No Known Problems Maternal Grandfather     Diabetes Paternal Grandmother     No Known Problems Paternal Grandfather     Breast cancer Neg Hx     Colon cancer Neg Hx     Cancer Neg Hx     Ovarian cancer Neg Hx      Social History     Tobacco Use    Smoking status: Never    Smokeless tobacco: Never   Vaping Use    Vaping status: Never Used   Substance Use Topics    Alcohol use: Not Currently    Drug use: Never         Current Outpatient Medications:     MISC NATURAL PRODUCTS PO, Take by mouth Placenta capsule, Disp: , Rfl:     norethindrone (MICRONOR) 0.35 MG tablet, Take 1 tablet (0.35 mg total) by mouth daily, Disp: 90 tablet, Rfl: 1    Patient Active Problem List    Diagnosis Date Noted    Pregnancy affected by fetal growth restriction 2024    Rh negative state in antepartum period 2024     (spontaneous vaginal delivery) 01/10/2024     No Known Allergies    OB History    Para Term  AB Living   1 1 1 0 0 1   SAB IAB Ectopic Multiple Live Births   0 0 0 0 1      # Outcome Date GA Lbr Brayden/2nd Weight Sex Type Anes PTL Lv   1 Term 24 39w5d / 01:18 2495 g (5 lb 8 oz) F Vag-Spont EPI N MARK       Vitals:    24 1058   BP: 122/80   BP Location: Left arm   Patient Position: Sitting   Cuff Size: Large   Weight: 74.8 kg (165 lb)   Height: 5' 4\" (1.626 m)     Body mass index is 28.32 kg/m².      Review of Systems     Constitutional: Negative for chills, fatigue, fever, headaches, visual disturbances, and unexpected weight change. "   Respiratory: Negative for cough, & shortness of breath.  Cardiovascular: Negative for chest pain. .    Gastrointestinal: Negative for Abd pain, nausea & vomiting, constipation and diarrhea.   Genitourinary: Negative for difficulty urinating, dysuria, hematuria, unusual vaginal bleeding or discharge  Skin: Negative skin changes    Physical Exam     Constitutional: Alert & Oriented x3, well-developed and well-nourished. No distress.   HENT: Atraumatic, Normocephalic, Conjunctivae clear  Neck: Normal range of motion.  Pulmonary: Effort normal.   Abdominal: Soft. No tenderness or masses  Musculoskeletal: Normal ROM  Skin: Warm & Dry  Psychological: Normal mood, thought content, behavior & judgement     Breasts: Patient Reported   Right: tissue soft without  tenderness, skin changes  No areas of erythema or pain.   Left:  tissue soft without  tenderness, skin changes  No areas of erythema or pain.     Pelvic exam was performed with patient supine, lithotomy position.      Labia: Negative rash, tenderness, lesion or injury on the right labia.              Negative rash, tenderness, lesion or injury on the left labia.   Urethral meatus:  Negative for  tenderness, inflammation or discharge.   Uterus: not deviated, enlarged, fixed or tender.   Cervix: No CMT, no discharge or friability.   Right adnexa: no mass, no tenderness and no fullness.  Left adnexa: no mass, no tenderness and no fullness.   Vagina: No erythema, tenderness, masses, or foreign body in the vagina. . No unusual vaginal discharge .  Perineal laceration healing well sutures remain intact  Perineum without lesions, erythema or swelling.  Inguinal Canal:        Right: No inguinal adenopathy or hernia present.        Left: No inguinal adenopathy or hernia present.

## 2024-09-17 ENCOUNTER — NURSE TRIAGE (OUTPATIENT)
Age: 29
End: 2024-09-17

## 2024-09-17 NOTE — TELEPHONE ENCOUNTER
"Patient calling in stating that she is breastfeeding/ pumping, pt reporting that she has a rash on her breasts bilaterally that is itchy. Pt reporting that when pumping, the rash gets swollen and itchy. Pt reporting this started a week ago. Pt reporting that after pumping she applies ice to her breasts. Pt c/o redness and rash, pt denies fever, breast pain, nipple discharge     Patient would like to be seen, attempted to find sooner appointment available, Spoke to carri in the office for further assistance, as per Carri, send a message to clinical staff to see if there is an afternoon appt available, office staff will contact the patient, patient was notified no further questions.       Reason for Disposition   Patient wants to be seen    Answer Assessment - Initial Assessment Questions  1. SYMPTOM: \"What's the main symptom you're concerned about?\"  (e.g., lump, pain, rash, nipple discharge)      Rash covering both breasts bilaterally, pt reporting a raised rash when pumping     3. ONSET: \"When did symptoms   start?\"      A week ago.   4. PRIOR HISTORY: \"Do you have any history of prior problems with your breasts?\" (e.g., lumps, cancer, fibrocystic breast disease)      Pt denies   5. CAUSE: \"What do you think is causing this symptom?\"      Pt unsure   6. OTHER SYMPTOMS: \"Do you have any other symptoms?\" (e.g., fever, breast pain, redness or rash, nipple discharge)      Pt c/o redness and rash, pt denies fever, breast pain, nipple discharge   7. PREGNANCY-BREASTFEEDING: \"Is there any chance you are pregnant?\" \"When was your last menstrual period?\" \"Are you breastfeeding?\"      Breastfeeding, delivered 7/31/24.    Protocols used: Breast Symptoms-ADULT-[place]    "

## 2024-09-19 ENCOUNTER — OFFICE VISIT (OUTPATIENT)
Age: 29
End: 2024-09-19
Payer: COMMERCIAL

## 2024-09-19 VITALS
WEIGHT: 155.6 LBS | HEIGHT: 65 IN | SYSTOLIC BLOOD PRESSURE: 112 MMHG | DIASTOLIC BLOOD PRESSURE: 62 MMHG | TEMPERATURE: 97.8 F | HEART RATE: 91 BPM | BODY MASS INDEX: 25.92 KG/M2

## 2024-09-19 DIAGNOSIS — L23.89 ALLERGIC CONTACT DERMATITIS DUE TO OTHER AGENTS: Primary | ICD-10-CM

## 2024-09-19 PROCEDURE — 99213 OFFICE O/P EST LOW 20 MIN: CPT | Performed by: OBSTETRICS & GYNECOLOGY

## 2024-09-19 NOTE — ASSESSMENT & PLAN NOTE
Breasts with symmetrical rash bilaterally along area where in contact with portable pump  Rash appears raised but is improving, itchiness has resolved  Suspect that this is a contact dermatitis likely from material she came into contact with from the breast pump- as it started to improve immediately with cessation of that portable pump. Encouraged to use OTC hydrocortisone cream to the area and try benadryl to assist with allergic symptoms   Plan for follow up in 1 week to assess improvement/response

## 2024-09-19 NOTE — PROGRESS NOTES
Ambulatory Visit  Name: Zakiya Ozuna      : 1995      MRN: [id number]  Encounter Provider: Maryjane Hernandez DO  Encounter Date: 2024   Encounter department: [facility] [place]    Assessment & Plan  Allergic contact dermatitis due to other agents  Breasts with symmetrical rash bilaterally along area where in contact with portable pump  Rash appears raised but is improving, itchiness has resolved  Suspect that this is a contact dermatitis likely from material she came into contact with from the breast pump- as it started to improve immediately with cessation of that portable pump. Encouraged to use OTC hydrocortisone cream to the area and try benadryl to assist with allergic symptoms   Plan for follow up in 1 week to assess improvement/response           History of Present Illness       Zakiya Ozuna is a 28 y.o. female who presents today for breast rash.     She notes that she tried a new portable breast pump and shortly after she started using this she noticed a raised itchy rash across both of her breasts bilaterally. The rash is in the distribution of where the pump parts come into contact with her skin.   The rash has not spread anywhere  she initially thought this was due to pulling/tugging on her breast tissue due to the heaviness of the wearable pump.  She has otherwise had no issues and is exclusively pumping.    She last used the pump two days ago. She notes that since stopping, the rash has greatly improved and the itching has mostly resolved.  She has been using coconut oil to moisturize the area due to dryness.   She denies any weeping or oozing from the rash.  Denies any new soaps, lotions, washes or detergents that she has come into contact with.       History obtained from : patient  Review of Systems  Past Medical History   Past Medical History:   Diagnosis Date    Varicella      Past Surgical History:   Procedure Laterality Date     "APPENDECTOMY  2012     Family History   Problem Relation Age of Onset    No Known Problems Mother     No Known Problems Father     No Known Problems Brother     No Known Problems Maternal Grandmother     No Known Problems Maternal Grandfather     Diabetes Paternal Grandmother     No Known Problems Paternal Grandfather     Breast cancer Neg Hx     Colon cancer Neg Hx     Cancer Neg Hx     Ovarian cancer Neg Hx      Current Outpatient Medications on File Prior to Visit   Medication Sig Dispense Refill    MISC NATURAL PRODUCTS PO Take by mouth Placenta capsule      norethindrone (MICRONOR) 0.35 MG tablet Take 1 tablet (0.35 mg total) by mouth daily 90 tablet 1     No current facility-administered medications on file prior to visit.   No Known Allergies       Objective     /62 (BP Location: Left arm, Patient Position: Sitting, Cuff Size: Adult)   Pulse 91   Temp 97.8 °F (36.6 °C) (Temporal)   Ht 5' 4.5\" (1.638 m)   Wt 70.6 kg (155 lb 9.6 oz)   LMP 10/27/2023 (Exact Date)   Breastfeeding Yes   BMI 26.30 kg/m²     Physical Exam  Constitutional:       General: She is not in acute distress.  HENT:      Head: Normocephalic and atraumatic.      Mouth/Throat:      Mouth: Mucous membranes are moist.   Cardiovascular:      Rate and Rhythm: Normal rate.   Pulmonary:      Effort: Pulmonary effort is normal. No respiratory distress.   Chest:   Breasts:     Right: Skin change present.      Left: Skin change present.          Comments: Raised erythematous rash across both breasts bilaterally  No drainage appreciated from rash, nontender to touch  Abdominal:      General: There is no distension.   Genitourinary:     Comments: deferred  Skin:     General: Skin is warm and dry.   Neurological:      Mental Status: She is alert.   Psychiatric:         Mood and Affect: Mood normal.         Behavior: Behavior normal.         "

## 2024-11-12 ENCOUNTER — ANNUAL EXAM (OUTPATIENT)
Dept: OBGYN CLINIC | Facility: CLINIC | Age: 29
End: 2024-11-12
Payer: COMMERCIAL

## 2024-11-12 VITALS
WEIGHT: 147 LBS | DIASTOLIC BLOOD PRESSURE: 64 MMHG | HEIGHT: 67 IN | SYSTOLIC BLOOD PRESSURE: 108 MMHG | BODY MASS INDEX: 23.07 KG/M2

## 2024-11-12 DIAGNOSIS — Z30.011 ENCOUNTER FOR INITIAL PRESCRIPTION OF CONTRACEPTIVE PILLS: ICD-10-CM

## 2024-11-12 DIAGNOSIS — Z01.419 ENCOUNTER FOR ANNUAL ROUTINE GYNECOLOGICAL EXAMINATION: Primary | ICD-10-CM

## 2024-11-12 DIAGNOSIS — Z12.4 PAP SMEAR FOR CERVICAL CANCER SCREENING: ICD-10-CM

## 2024-11-12 PROCEDURE — G0476 HPV COMBO ASSAY CA SCREEN: HCPCS | Performed by: OBSTETRICS & GYNECOLOGY

## 2024-11-12 PROCEDURE — G0145 SCR C/V CYTO,THINLAYER,RESCR: HCPCS | Performed by: OBSTETRICS & GYNECOLOGY

## 2024-11-12 PROCEDURE — S0612 ANNUAL GYNECOLOGICAL EXAMINA: HCPCS | Performed by: OBSTETRICS & GYNECOLOGY

## 2024-11-12 RX ORDER — ACETAMINOPHEN AND CODEINE PHOSPHATE 120; 12 MG/5ML; MG/5ML
1 SOLUTION ORAL DAILY
Qty: 90 TABLET | Refills: 3 | Status: SHIPPED | OUTPATIENT
Start: 2024-11-12

## 2024-11-12 NOTE — PROGRESS NOTES
Ambulatory Visit  Name: Zakiya Ozuna      : 1995      MRN: [id number]  Encounter Provider: Karlene Corrigan MD  Encounter Date: 2024   Encounter department: [facility] [place]    Assessment & Plan  Encounter for annual routine gynecological examination  Pap/HPV collected  Contraception: continue norethindrone until breastfeeding is complete.        Pap smear for cervical cancer screening    Orders:    Liquid-based pap, screening    Encounter for initial prescription of contraceptive pills    Orders:    norethindrone (MICRONOR) 0.35 MG tablet; Take 1 tablet (0.35 mg total) by mouth daily      History of Present Illness     Zakiya Ozuna is a 29 y.o. female who presents for a routine annual visit    Menarche- Y/O  Last Pap Smear- 10/6/21 neg--collect today  LMP-has not gotten yet  Birth control-ocp    Non smoker  Social drinker  Currently sexually active  Gardasil series-not completed  No family history of uterine, ovarian, cervical or breast cancer    No concerns/questions for today's visit        Review of Systems   Constitutional:  Negative for activity change, appetite change, chills, fatigue and fever.   HENT:  Negative for ear pain, rhinorrhea, sneezing and sore throat.    Eyes:  Negative for pain and visual disturbance.   Respiratory:  Negative for cough, shortness of breath and wheezing.    Cardiovascular:  Negative for chest pain, palpitations and leg swelling.   Gastrointestinal:  Negative for abdominal distention, abdominal pain, constipation, diarrhea, nausea and vomiting.   Genitourinary:  Positive for dyspareunia. Negative for decreased urine volume, difficulty urinating, dysuria, frequency, hematuria, menstrual problem, pelvic pain, urgency, vaginal bleeding and vaginal discharge.   Musculoskeletal:  Negative for arthralgias and back pain.   Skin:  Negative for color change and rash.   Neurological:  Negative for seizures, syncope and  "light-headedness.   All other systems reviewed and are negative.          Objective     /64 (BP Location: Left arm, Patient Position: Sitting, Cuff Size: Standard)   Ht 5' 7\" (1.702 m)   Wt 66.7 kg (147 lb)   BMI 23.02 kg/m²     Physical Exam  Chest:   Breasts:     Breasts are symmetrical.      Right: No inverted nipple, mass, nipple discharge, skin change or tenderness.      Left: No inverted nipple, mass, nipple discharge, skin change or tenderness.   Genitourinary:     Labia:         Right: No rash, tenderness, lesion or injury.         Left: No rash, tenderness, lesion or injury.       Vagina: Normal. No vaginal discharge, erythema, tenderness or bleeding.      Cervix: No cervical motion tenderness, discharge, friability, erythema or cervical bleeding.      Uterus: Not deviated, not enlarged, not fixed and not tender.       Adnexa:         Right: No mass, tenderness or fullness.          Left: No mass, tenderness or fullness.     Neurological:      Mental Status: She is alert and oriented to person, place, and time.         "

## 2024-11-13 LAB
HPV HR 12 DNA CVX QL NAA+PROBE: NEGATIVE
HPV16 DNA CVX QL NAA+PROBE: NEGATIVE
HPV18 DNA CVX QL NAA+PROBE: NEGATIVE

## 2024-11-15 ENCOUNTER — RESULTS FOLLOW-UP (OUTPATIENT)
Dept: OTHER | Facility: HOSPITAL | Age: 29
End: 2024-11-15

## 2024-11-15 LAB
LAB AP GYN PRIMARY INTERPRETATION: NORMAL
Lab: NORMAL